# Patient Record
Sex: MALE | Race: WHITE | Employment: FULL TIME | URBAN - METROPOLITAN AREA
[De-identification: names, ages, dates, MRNs, and addresses within clinical notes are randomized per-mention and may not be internally consistent; named-entity substitution may affect disease eponyms.]

---

## 2017-04-08 ENCOUNTER — EMPLOYEE WELLNESS (OUTPATIENT)
Dept: OTHER | Age: 37
End: 2017-04-08

## 2017-04-08 LAB
CHOLESTEROL/HDL RATIO: 2.7
CHOLESTEROL: 181 MG/DL
GLUCOSE BLD-MCNC: 89 MG/DL (ref 70–99)
HDLC SERPL-MCNC: 68 MG/DL
LDL CHOLESTEROL: 97 MG/DL (ref 0–130)
PATIENT FASTING?: YES
TRIGL SERPL-MCNC: 78 MG/DL
VLDLC SERPL CALC-MCNC: NORMAL MG/DL (ref 1–30)

## 2017-05-18 ENCOUNTER — HOSPITAL ENCOUNTER (OUTPATIENT)
Age: 37
Discharge: HOME | End: 2017-05-18
Payer: COMMERCIAL

## 2017-05-18 LAB
ABSOLUTE EOS #: 0.2 K/UL (ref 0–0.4)
ABSOLUTE LYMPH #: 2.9 K/UL (ref 1–4.8)
ABSOLUTE MONO #: 0.8 K/UL (ref 0–1)
ANION GAP SERPL CALCULATED.3IONS-SCNC: 13 MMOL/L (ref 9–17)
BASOPHILS # BLD: 1 %
BASOPHILS ABSOLUTE: 0 K/UL (ref 0–0.2)
BUN BLDV-MCNC: 14 MG/DL (ref 6–20)
BUN/CREAT BLD: 18 (ref 9–20)
CALCIUM SERPL-MCNC: 9.3 MG/DL (ref 8.6–10.4)
CHLORIDE BLD-SCNC: 97 MMOL/L (ref 98–107)
CO2: 27 MMOL/L (ref 20–31)
CREAT SERPL-MCNC: 0.78 MG/DL (ref 0.7–1.2)
DIFFERENTIAL TYPE: NORMAL
EOSINOPHILS RELATIVE PERCENT: 3 %
GFR AFRICAN AMERICAN: >60 ML/MIN
GFR NON-AFRICAN AMERICAN: >60 ML/MIN
GFR SERPL CREATININE-BSD FRML MDRD: ABNORMAL ML/MIN/{1.73_M2}
GFR SERPL CREATININE-BSD FRML MDRD: ABNORMAL ML/MIN/{1.73_M2}
GLUCOSE BLD-MCNC: 91 MG/DL (ref 70–99)
HCT VFR BLD CALC: 43.8 % (ref 41–53)
HEMOGLOBIN: 14.7 G/DL (ref 13.5–17)
LYMPHOCYTES # BLD: 40 %
MCH RBC QN AUTO: 29.9 PG (ref 26–34)
MCHC RBC AUTO-ENTMCNC: 33.5 G/DL (ref 31–37)
MCV RBC AUTO: 89.1 FL (ref 80–100)
MONOCYTES # BLD: 10 %
PDW BLD-RTO: 13.8 % (ref 12.1–15.2)
PLATELET # BLD: 233 K/UL (ref 140–450)
PLATELET ESTIMATE: NORMAL
PMV BLD AUTO: NORMAL FL (ref 6–12)
POTASSIUM SERPL-SCNC: 4.5 MMOL/L (ref 3.7–5.3)
RBC # BLD: 4.91 M/UL (ref 4.5–5.9)
RBC # BLD: NORMAL 10*6/UL
SEG NEUTROPHILS: 46 %
SEGMENTED NEUTROPHILS ABSOLUTE COUNT: 3.4 K/UL (ref 1.8–7.7)
SODIUM BLD-SCNC: 137 MMOL/L (ref 135–144)
WBC # BLD: 7.3 K/UL (ref 3.5–11)
WBC # BLD: NORMAL 10*3/UL

## 2017-05-18 PROCEDURE — 36415 COLL VENOUS BLD VENIPUNCTURE: CPT

## 2017-05-18 PROCEDURE — 85025 COMPLETE CBC W/AUTO DIFF WBC: CPT

## 2017-05-18 PROCEDURE — 80048 BASIC METABOLIC PNL TOTAL CA: CPT

## 2017-05-25 ENCOUNTER — ANESTHESIA EVENT (OUTPATIENT)
Dept: OPERATING ROOM | Age: 37
End: 2017-05-25
Payer: COMMERCIAL

## 2017-05-26 ENCOUNTER — ANESTHESIA (OUTPATIENT)
Dept: OPERATING ROOM | Age: 37
End: 2017-05-26
Payer: COMMERCIAL

## 2017-05-26 ENCOUNTER — HOSPITAL ENCOUNTER (OUTPATIENT)
Age: 37
Setting detail: OUTPATIENT SURGERY
Discharge: HOME | End: 2017-05-26
Attending: ORTHOPAEDIC SURGERY | Admitting: ORTHOPAEDIC SURGERY
Payer: COMMERCIAL

## 2017-05-26 VITALS
RESPIRATION RATE: 18 BRPM | HEIGHT: 72 IN | HEART RATE: 85 BPM | WEIGHT: 206 LBS | BODY MASS INDEX: 27.9 KG/M2 | OXYGEN SATURATION: 98 % | DIASTOLIC BLOOD PRESSURE: 70 MMHG | SYSTOLIC BLOOD PRESSURE: 113 MMHG | TEMPERATURE: 97.6 F

## 2017-05-26 VITALS — SYSTOLIC BLOOD PRESSURE: 106 MMHG | OXYGEN SATURATION: 98 % | TEMPERATURE: 97.2 F | DIASTOLIC BLOOD PRESSURE: 62 MMHG

## 2017-05-26 PROCEDURE — 64415 NJX AA&/STRD BRCH PLXS IMG: CPT | Performed by: NURSE ANESTHETIST, CERTIFIED REGISTERED

## 2017-05-26 PROCEDURE — 3700000000 HC ANESTHESIA ATTENDED CARE: Performed by: ORTHOPAEDIC SURGERY

## 2017-05-26 PROCEDURE — 3700000001 HC ADD 15 MINUTES (ANESTHESIA): Performed by: ORTHOPAEDIC SURGERY

## 2017-05-26 PROCEDURE — 6360000002 HC RX W HCPCS: Performed by: ORTHOPAEDIC SURGERY

## 2017-05-26 PROCEDURE — 6360000002 HC RX W HCPCS: Performed by: NURSE ANESTHETIST, CERTIFIED REGISTERED

## 2017-05-26 PROCEDURE — 3600000004 HC SURGERY LEVEL 4 BASE: Performed by: ORTHOPAEDIC SURGERY

## 2017-05-26 PROCEDURE — C1713 ANCHOR/SCREW BN/BN,TIS/BN: HCPCS | Performed by: ORTHOPAEDIC SURGERY

## 2017-05-26 PROCEDURE — 2580000003 HC RX 258: Performed by: ORTHOPAEDIC SURGERY

## 2017-05-26 PROCEDURE — 2720000010 HC SURG SUPPLY STERILE: Performed by: ORTHOPAEDIC SURGERY

## 2017-05-26 PROCEDURE — 3600000014 HC SURGERY LEVEL 4 ADDTL 15MIN: Performed by: ORTHOPAEDIC SURGERY

## 2017-05-26 PROCEDURE — 2500000003 HC RX 250 WO HCPCS: Performed by: NURSE ANESTHETIST, CERTIFIED REGISTERED

## 2017-05-26 PROCEDURE — 7100000011 HC PHASE II RECOVERY - ADDTL 15 MIN: Performed by: ORTHOPAEDIC SURGERY

## 2017-05-26 PROCEDURE — 7100000010 HC PHASE II RECOVERY - FIRST 15 MIN: Performed by: ORTHOPAEDIC SURGERY

## 2017-05-26 DEVICE — ANCHOR SUT L12.5MM DIA2.9MM SHT SHLDR BIOCOMPOSITE PUSHLOCK: Type: IMPLANTABLE DEVICE | Site: SHOULDER | Status: FUNCTIONAL

## 2017-05-26 RX ORDER — PROPOFOL 10 MG/ML
INJECTION, EMULSION INTRAVENOUS PRN
Status: DISCONTINUED | OUTPATIENT
Start: 2017-05-26 | End: 2017-05-26 | Stop reason: SDUPTHER

## 2017-05-26 RX ORDER — KETOROLAC TROMETHAMINE 30 MG/ML
30 INJECTION, SOLUTION INTRAMUSCULAR; INTRAVENOUS ONCE
Status: DISCONTINUED | OUTPATIENT
Start: 2017-05-26 | End: 2017-05-26 | Stop reason: HOSPADM

## 2017-05-26 RX ORDER — OXYCODONE HYDROCHLORIDE AND ACETAMINOPHEN 5; 325 MG/1; MG/1
TABLET ORAL
Qty: 60 TABLET | Refills: 0 | Status: SHIPPED | OUTPATIENT
Start: 2017-05-26 | End: 2017-06-02

## 2017-05-26 RX ORDER — OXYCODONE HYDROCHLORIDE AND ACETAMINOPHEN 5; 325 MG/1; MG/1
2 TABLET ORAL EVERY 4 HOURS PRN
Status: DISCONTINUED | OUTPATIENT
Start: 2017-05-26 | End: 2017-05-26 | Stop reason: HOSPADM

## 2017-05-26 RX ORDER — LIDOCAINE HYDROCHLORIDE 20 MG/ML
INJECTION, SOLUTION INFILTRATION; PERINEURAL PRN
Status: DISCONTINUED | OUTPATIENT
Start: 2017-05-26 | End: 2017-05-26 | Stop reason: SDUPTHER

## 2017-05-26 RX ORDER — MIDAZOLAM HYDROCHLORIDE 1 MG/ML
INJECTION INTRAMUSCULAR; INTRAVENOUS PRN
Status: DISCONTINUED | OUTPATIENT
Start: 2017-05-26 | End: 2017-05-26 | Stop reason: SDUPTHER

## 2017-05-26 RX ORDER — ONDANSETRON 2 MG/ML
INJECTION INTRAMUSCULAR; INTRAVENOUS PRN
Status: DISCONTINUED | OUTPATIENT
Start: 2017-05-26 | End: 2017-05-26 | Stop reason: SDUPTHER

## 2017-05-26 RX ORDER — KETOROLAC TROMETHAMINE 10 MG/1
10 TABLET, FILM COATED ORAL 3 TIMES DAILY
Qty: 15 TABLET | Refills: 0 | Status: SHIPPED | OUTPATIENT
Start: 2017-05-26 | End: 2018-06-26

## 2017-05-26 RX ORDER — SODIUM CHLORIDE, SODIUM LACTATE, POTASSIUM CHLORIDE, CALCIUM CHLORIDE 600; 310; 30; 20 MG/100ML; MG/100ML; MG/100ML; MG/100ML
INJECTION, SOLUTION INTRAVENOUS CONTINUOUS
Status: DISCONTINUED | OUTPATIENT
Start: 2017-05-26 | End: 2017-05-26 | Stop reason: HOSPADM

## 2017-05-26 RX ORDER — FENTANYL CITRATE 50 UG/ML
INJECTION, SOLUTION INTRAMUSCULAR; INTRAVENOUS PRN
Status: DISCONTINUED | OUTPATIENT
Start: 2017-05-26 | End: 2017-05-26 | Stop reason: SDUPTHER

## 2017-05-26 RX ORDER — KETOROLAC TROMETHAMINE 30 MG/ML
INJECTION, SOLUTION INTRAMUSCULAR; INTRAVENOUS PRN
Status: DISCONTINUED | OUTPATIENT
Start: 2017-05-26 | End: 2017-05-26 | Stop reason: SDUPTHER

## 2017-05-26 RX ADMIN — PHENYLEPHRINE HYDROCHLORIDE 150 MCG: 10 INJECTION INTRAVENOUS at 13:07

## 2017-05-26 RX ADMIN — LIDOCAINE HYDROCHLORIDE 70 MG: 20 INJECTION, SOLUTION INFILTRATION; PERINEURAL at 12:30

## 2017-05-26 RX ADMIN — SODIUM CHLORIDE, POTASSIUM CHLORIDE, SODIUM LACTATE AND CALCIUM CHLORIDE: 600; 310; 30; 20 INJECTION, SOLUTION INTRAVENOUS at 12:58

## 2017-05-26 RX ADMIN — PHENYLEPHRINE HYDROCHLORIDE 100 MCG: 10 INJECTION INTRAVENOUS at 12:56

## 2017-05-26 RX ADMIN — FENTANYL CITRATE 100 MCG: 50 INJECTION INTRAMUSCULAR; INTRAVENOUS at 11:09

## 2017-05-26 RX ADMIN — PROPOFOL 170 MG: 10 INJECTION, EMULSION INTRAVENOUS at 12:30

## 2017-05-26 RX ADMIN — PHENYLEPHRINE HYDROCHLORIDE 100 MCG: 10 INJECTION INTRAVENOUS at 13:01

## 2017-05-26 RX ADMIN — CEFAZOLIN SODIUM 2 G: 2 SOLUTION INTRAVENOUS at 12:21

## 2017-05-26 RX ADMIN — ONDANSETRON 4 MG: 2 INJECTION INTRAMUSCULAR; INTRAVENOUS at 13:16

## 2017-05-26 RX ADMIN — MIDAZOLAM HYDROCHLORIDE 4 MG: 1 INJECTION, SOLUTION INTRAMUSCULAR; INTRAVENOUS at 11:09

## 2017-05-26 RX ADMIN — SODIUM CHLORIDE, POTASSIUM CHLORIDE, SODIUM LACTATE AND CALCIUM CHLORIDE: 600; 310; 30; 20 INJECTION, SOLUTION INTRAVENOUS at 08:57

## 2017-05-26 RX ADMIN — KETOROLAC TROMETHAMINE 30 MG: 30 INJECTION, SOLUTION INTRAMUSCULAR at 13:16

## 2017-05-26 RX ADMIN — PHENYLEPHRINE HYDROCHLORIDE 150 MCG: 10 INJECTION INTRAVENOUS at 12:54

## 2017-05-26 ASSESSMENT — PAIN SCALES - GENERAL
PAINLEVEL_OUTOF10: 0

## 2017-05-26 ASSESSMENT — PAIN - FUNCTIONAL ASSESSMENT: PAIN_FUNCTIONAL_ASSESSMENT: 0-10

## 2018-03-20 VITALS — WEIGHT: 202 LBS | BODY MASS INDEX: 26.65 KG/M2

## 2018-04-03 ENCOUNTER — EMPLOYEE WELLNESS (OUTPATIENT)
Dept: OTHER | Age: 38
End: 2018-04-03

## 2018-04-03 LAB
CHOLESTEROL/HDL RATIO: 2.6
CHOLESTEROL: 150 MG/DL
GLUCOSE BLD-MCNC: 88 MG/DL (ref 70–99)
HDLC SERPL-MCNC: 58 MG/DL
LDL CHOLESTEROL: 75 MG/DL (ref 0–130)
PATIENT FASTING?: YES
TRIGL SERPL-MCNC: 85 MG/DL
VLDLC SERPL CALC-MCNC: NORMAL MG/DL (ref 1–30)

## 2018-04-10 VITALS — WEIGHT: 194 LBS | BODY MASS INDEX: 26.31 KG/M2

## 2019-03-29 ENCOUNTER — EMPLOYEE WELLNESS (OUTPATIENT)
Dept: OTHER | Age: 39
End: 2019-03-29

## 2019-03-29 LAB
CHOLESTEROL/HDL RATIO: 2.4
CHOLESTEROL: 154 MG/DL
GLUCOSE BLD-MCNC: 86 MG/DL (ref 70–99)
HDLC SERPL-MCNC: 63 MG/DL
LDL CHOLESTEROL: 78 MG/DL (ref 0–130)
PATIENT FASTING?: YES
TRIGL SERPL-MCNC: 67 MG/DL
VLDLC SERPL CALC-MCNC: NORMAL MG/DL (ref 1–30)

## 2019-04-08 VITALS — BODY MASS INDEX: 26.52 KG/M2 | WEIGHT: 201 LBS

## 2019-08-29 ENCOUNTER — OFFICE VISIT (OUTPATIENT)
Dept: PRIMARY CARE CLINIC | Age: 39
End: 2019-08-29
Payer: COMMERCIAL

## 2019-08-29 VITALS
DIASTOLIC BLOOD PRESSURE: 69 MMHG | HEART RATE: 74 BPM | SYSTOLIC BLOOD PRESSURE: 114 MMHG | WEIGHT: 210 LBS | TEMPERATURE: 98.2 F | BODY MASS INDEX: 27.71 KG/M2

## 2019-08-29 DIAGNOSIS — H60.312 ACUTE DIFFUSE OTITIS EXTERNA OF LEFT EAR: Primary | ICD-10-CM

## 2019-08-29 PROCEDURE — 99213 OFFICE O/P EST LOW 20 MIN: CPT | Performed by: NURSE PRACTITIONER

## 2019-08-29 RX ORDER — AMOXICILLIN AND CLAVULANATE POTASSIUM 875; 125 MG/1; MG/1
1 TABLET, FILM COATED ORAL 2 TIMES DAILY
Qty: 20 TABLET | Refills: 0 | Status: SHIPPED | OUTPATIENT
Start: 2019-08-29 | End: 2019-09-08

## 2019-08-29 RX ORDER — CETIRIZINE HYDROCHLORIDE 10 MG/1
10 TABLET ORAL DAILY PRN
COMMUNITY

## 2019-08-29 RX ORDER — CIPROFLOXACIN AND DEXAMETHASONE 3; 1 MG/ML; MG/ML
4 SUSPENSION/ DROPS AURICULAR (OTIC) 2 TIMES DAILY
Qty: 1 BOTTLE | Refills: 0 | Status: SHIPPED | OUTPATIENT
Start: 2019-08-29 | End: 2019-09-08

## 2019-08-29 RX ORDER — MELOXICAM 15 MG/1
15 TABLET ORAL DAILY
COMMUNITY
End: 2020-05-21 | Stop reason: ALTCHOICE

## 2019-08-29 ASSESSMENT — ENCOUNTER SYMPTOMS
SORE THROAT: 0
RHINORRHEA: 0
VOMITING: 0
ABDOMINAL PAIN: 0
DIARRHEA: 0
COUGH: 0

## 2019-08-29 NOTE — PATIENT INSTRUCTIONS
SURVEY:    You may be receiving a survey from [employer] regarding your visit today. Please complete the survey to enable us to provide the highest quality of care to you and your family. If you cannot score us a very good on any question, please call the office to discuss how we could of made your experience a very good one. Thank you. Patient Education        Swimmer's Ear: Care Instructions  Your Care Instructions    Swimmer's ear (otitis externa) is inflammation or infection of the ear canal. This is the passage that leads from the outer ear to the eardrum. Any water, sand, or other debris that gets into the ear canal and stays there can cause swimmer's ear. Putting cotton swabs or other items in the ear to clean it can also cause this problem. Swimmer's ear can be very painful. But you can treat the pain and infection with medicines. You should feel better in a few days. Follow-up care is a key part of your treatment and safety. Be sure to make and go to all appointments, and call your doctor if you are having problems. It's also a good idea to know your test results and keep a list of the medicines you take. How can you care for yourself at home? Cleaning and care  · Use antibiotic drops as your doctor directs. · Do not insert ear drops (other than the antibiotic ear drops) or anything else into the ear unless your doctor has told you to. · Avoid getting water in the ear until the problem clears up. Use cotton lightly coated with petroleum jelly as an earplug. Do not use plastic earplugs. · Use a hair dryer set on low to carefully dry the ear after you shower. · To ease ear pain, hold a warm washcloth against your ear. · Take pain medicines exactly as directed. ? If the doctor gave you a prescription medicine for pain, take it as prescribed. ? If you are not taking a prescription pain medicine, ask your doctor if you can take an over-the-counter medicine.   Inserting ear drops  · Warm

## 2019-08-29 NOTE — PROGRESS NOTES
ciprofloxacin-dexamethasone (CIPRODEX) 0.3-0.1 % otic suspension     Sig: Place 4 drops into the left ear 2 times daily for 10 days     Dispense:  1 Bottle     Refill:  0    amoxicillin-clavulanate (AUGMENTIN) 875-125 MG per tablet     Sig: Take 1 tablet by mouth 2 times daily for 10 days     Dispense:  20 tablet     Refill:  0          All patient questions answered. Pt voiced understanding.       Electronically signed by [address] [place] [address], APRN - CNP on 8/29/2019 at 10:57 AM

## 2020-02-18 ENCOUNTER — OFFICE VISIT (OUTPATIENT)
Dept: PRIMARY CARE CLINIC | Age: 40
End: 2020-02-18
Payer: COMMERCIAL

## 2020-02-18 VITALS
DIASTOLIC BLOOD PRESSURE: 52 MMHG | TEMPERATURE: 97.8 F | BODY MASS INDEX: 28.1 KG/M2 | RESPIRATION RATE: 16 BRPM | SYSTOLIC BLOOD PRESSURE: 111 MMHG | HEIGHT: 73 IN | HEART RATE: 71 BPM | WEIGHT: 212 LBS

## 2020-02-18 PROCEDURE — 99214 OFFICE O/P EST MOD 30 MIN: CPT | Performed by: NURSE PRACTITIONER

## 2020-02-18 RX ORDER — PREDNISONE 20 MG/1
40 TABLET ORAL DAILY
Qty: 10 TABLET | Refills: 0 | Status: SHIPPED | OUTPATIENT
Start: 2020-02-18 | End: 2020-02-23

## 2020-02-18 RX ORDER — AMOXICILLIN AND CLAVULANATE POTASSIUM 875; 125 MG/1; MG/1
1 TABLET, FILM COATED ORAL 2 TIMES DAILY
Qty: 20 TABLET | Refills: 0 | Status: SHIPPED | OUTPATIENT
Start: 2020-02-18 | End: 2020-02-28

## 2020-02-18 ASSESSMENT — ENCOUNTER SYMPTOMS
SHORTNESS OF BREATH: 0
RHINORRHEA: 1
SINUS PRESSURE: 1
TROUBLE SWALLOWING: 0
SINUS PAIN: 1
SORE THROAT: 1
COUGH: 0
ABDOMINAL PAIN: 0
VOMITING: 0
WHEEZING: 0
NAUSEA: 0
DIARRHEA: 0

## 2020-05-19 PROBLEM — F41.9 ANXIETY: Status: ACTIVE | Noted: 2020-05-19

## 2020-05-19 PROBLEM — F32.A DEPRESSION: Status: ACTIVE | Noted: 2020-05-19

## 2020-05-21 ENCOUNTER — TELEPHONE (OUTPATIENT)
Dept: PRIMARY CARE CLINIC | Age: 40
End: 2020-05-21

## 2020-05-21 ENCOUNTER — OFFICE VISIT (OUTPATIENT)
Dept: PRIMARY CARE CLINIC | Age: 40
End: 2020-05-21
Payer: COMMERCIAL

## 2020-05-21 ENCOUNTER — HOSPITAL ENCOUNTER (OUTPATIENT)
Age: 40
Discharge: HOME | End: 2020-05-21
Payer: COMMERCIAL

## 2020-05-21 VITALS
RESPIRATION RATE: 14 BRPM | HEART RATE: 79 BPM | DIASTOLIC BLOOD PRESSURE: 56 MMHG | SYSTOLIC BLOOD PRESSURE: 97 MMHG | HEIGHT: 72 IN | BODY MASS INDEX: 27.66 KG/M2 | WEIGHT: 204.2 LBS | TEMPERATURE: 98.1 F

## 2020-05-21 LAB
ABSOLUTE EOS #: 0.13 K/UL (ref 0–0.44)
ABSOLUTE IMMATURE GRANULOCYTE: <0.03 K/UL (ref 0–0.3)
ABSOLUTE LYMPH #: 2.12 K/UL (ref 1.1–3.7)
ABSOLUTE MONO #: 0.65 K/UL (ref 0.1–1.2)
ALBUMIN SERPL-MCNC: 4.7 G/DL (ref 3.5–5.2)
ALBUMIN/GLOBULIN RATIO: 1.7 (ref 1–2.5)
ALP BLD-CCNC: 61 U/L (ref 40–129)
ALT SERPL-CCNC: 14 U/L (ref 5–41)
AMYLASE: 71 U/L (ref 28–100)
ANION GAP SERPL CALCULATED.3IONS-SCNC: 14 MMOL/L (ref 9–17)
AST SERPL-CCNC: 17 U/L
BASOPHILS # BLD: 1 % (ref 0–2)
BASOPHILS ABSOLUTE: 0.04 K/UL (ref 0–0.2)
BILIRUB SERPL-MCNC: 0.66 MG/DL (ref 0.3–1.2)
BUN BLDV-MCNC: 12 MG/DL (ref 6–20)
BUN/CREAT BLD: 14 (ref 9–20)
CALCIUM SERPL-MCNC: 9.4 MG/DL (ref 8.6–10.4)
CHLORIDE BLD-SCNC: 103 MMOL/L (ref 98–107)
CHOLESTEROL/HDL RATIO: 2.7
CHOLESTEROL: 169 MG/DL
CO2: 26 MMOL/L (ref 20–31)
CREAT SERPL-MCNC: 0.87 MG/DL (ref 0.7–1.2)
DIFFERENTIAL TYPE: NORMAL
EOSINOPHILS RELATIVE PERCENT: 2 % (ref 1–4)
GFR AFRICAN AMERICAN: >60 ML/MIN
GFR NON-AFRICAN AMERICAN: >60 ML/MIN
GFR SERPL CREATININE-BSD FRML MDRD: NORMAL ML/MIN/{1.73_M2}
GFR SERPL CREATININE-BSD FRML MDRD: NORMAL ML/MIN/{1.73_M2}
GLUCOSE BLD-MCNC: 94 MG/DL (ref 70–99)
HCT VFR BLD CALC: 47.2 % (ref 40.7–50.3)
HDLC SERPL-MCNC: 62 MG/DL
HEMOGLOBIN: 15.3 G/DL (ref 13–17)
IMMATURE GRANULOCYTES: 0 %
LDL CHOLESTEROL: 88 MG/DL (ref 0–130)
LIPASE: 34 U/L (ref 13–60)
LYMPHOCYTES # BLD: 36 % (ref 24–43)
MCH RBC QN AUTO: 30.1 PG (ref 25.2–33.5)
MCHC RBC AUTO-ENTMCNC: 32.4 G/DL (ref 28.4–34.8)
MCV RBC AUTO: 92.9 FL (ref 82.6–102.9)
MONOCYTES # BLD: 11 % (ref 3–12)
NRBC AUTOMATED: 0 PER 100 WBC
PDW BLD-RTO: 12.3 % (ref 11.8–14.4)
PLATELET # BLD: 209 K/UL (ref 138–453)
PLATELET ESTIMATE: NORMAL
PMV BLD AUTO: 11.2 FL (ref 8.1–13.5)
POTASSIUM SERPL-SCNC: 4.5 MMOL/L (ref 3.7–5.3)
RBC # BLD: 5.08 M/UL (ref 4.21–5.77)
RBC # BLD: NORMAL 10*6/UL
SEG NEUTROPHILS: 50 % (ref 36–65)
SEGMENTED NEUTROPHILS ABSOLUTE COUNT: 3.03 K/UL (ref 1.5–8.1)
SODIUM BLD-SCNC: 143 MMOL/L (ref 135–144)
TOTAL PROTEIN: 7.5 G/DL (ref 6.4–8.3)
TRIGL SERPL-MCNC: 95 MG/DL
VLDLC SERPL CALC-MCNC: NORMAL MG/DL (ref 1–30)
WBC # BLD: 6 K/UL (ref 3.5–11.3)
WBC # BLD: NORMAL 10*3/UL

## 2020-05-21 PROCEDURE — 99214 OFFICE O/P EST MOD 30 MIN: CPT | Performed by: NURSE PRACTITIONER

## 2020-05-21 PROCEDURE — 36415 COLL VENOUS BLD VENIPUNCTURE: CPT

## 2020-05-21 PROCEDURE — 80061 LIPID PANEL: CPT

## 2020-05-21 PROCEDURE — 80053 COMPREHEN METABOLIC PANEL: CPT

## 2020-05-21 PROCEDURE — 85025 COMPLETE CBC W/AUTO DIFF WBC: CPT

## 2020-05-21 PROCEDURE — 83690 ASSAY OF LIPASE: CPT

## 2020-05-21 PROCEDURE — 82150 ASSAY OF AMYLASE: CPT

## 2020-05-21 RX ORDER — HYOSCYAMINE SULFATE 0.12 MG/1
1 TABLET SUBLINGUAL EVERY 4 HOURS PRN
Qty: 60 EACH | Refills: 1 | Status: SHIPPED | OUTPATIENT
Start: 2020-05-21 | End: 2020-11-20 | Stop reason: ALTCHOICE

## 2020-05-21 SDOH — ECONOMIC STABILITY: FOOD INSECURITY: WITHIN THE PAST 12 MONTHS, YOU WORRIED THAT YOUR FOOD WOULD RUN OUT BEFORE YOU GOT MONEY TO BUY MORE.: NEVER TRUE

## 2020-05-21 SDOH — ECONOMIC STABILITY: INCOME INSECURITY: HOW HARD IS IT FOR YOU TO PAY FOR THE VERY BASICS LIKE FOOD, HOUSING, MEDICAL CARE, AND HEATING?: NOT HARD AT ALL

## 2020-05-21 SDOH — ECONOMIC STABILITY: TRANSPORTATION INSECURITY
IN THE PAST 12 MONTHS, HAS LACK OF TRANSPORTATION KEPT YOU FROM MEETINGS, WORK, OR FROM GETTING THINGS NEEDED FOR DAILY LIVING?: NO

## 2020-05-21 SDOH — ECONOMIC STABILITY: FOOD INSECURITY: WITHIN THE PAST 12 MONTHS, THE FOOD YOU BOUGHT JUST DIDN'T LAST AND YOU DIDN'T HAVE MONEY TO GET MORE.: NEVER TRUE

## 2020-05-21 SDOH — ECONOMIC STABILITY: TRANSPORTATION INSECURITY
IN THE PAST 12 MONTHS, HAS THE LACK OF TRANSPORTATION KEPT YOU FROM MEDICAL APPOINTMENTS OR FROM GETTING MEDICATIONS?: NO

## 2020-05-21 ASSESSMENT — PATIENT HEALTH QUESTIONNAIRE - PHQ9
SUM OF ALL RESPONSES TO PHQ QUESTIONS 1-9: 0
SUM OF ALL RESPONSES TO PHQ9 QUESTIONS 1 & 2: 0
1. LITTLE INTEREST OR PLEASURE IN DOING THINGS: 0
2. FEELING DOWN, DEPRESSED OR HOPELESS: 0
SUM OF ALL RESPONSES TO PHQ QUESTIONS 1-9: 0

## 2020-05-21 ASSESSMENT — ENCOUNTER SYMPTOMS
BELCHING: 0
VOMITING: 0
COUGH: 0
NAUSEA: 1
SHORTNESS OF BREATH: 0
RHINORRHEA: 0
HEMATOCHEZIA: 0
CONSTIPATION: 0
FLATUS: 0
ABDOMINAL PAIN: 1
WHEEZING: 0
SORE THROAT: 0
DIARRHEA: 0

## 2020-05-21 ASSESSMENT — CROHNS DISEASE ACTIVITY INDEX (CDAI): CDAI SCORE: 0

## 2020-05-21 NOTE — PATIENT INSTRUCTIONS
SURVEY:    You may be receiving a survey from [email or web address] regarding your visit today. Please complete the survey to enable us to provide the highest quality of care to you and your family. If you cannot score us a very good on any question, please call the office to discuss how we could have made your experience a very good one. Thank you. Patient Education        Abdominal Pain: Care Instructions  Your Care Instructions    Abdominal pain has many possible causes. Some aren't serious and get better on their own in a few days. Others need more testing and treatment. If your pain continues or gets worse, you need to be rechecked and may need more tests to find out what is wrong. You may need surgery to correct the problem. Don't ignore new symptoms, such as fever, nausea and vomiting, urination problems, pain that gets worse, and dizziness. These may be signs of a more serious problem. Your doctor may have recommended a follow-up visit in the next 8 to 12 hours. If you are not getting better, you may need more tests or treatment. The doctor has checked you carefully, but problems can develop later. If you notice any problems or new symptoms, get medical treatment right away. Follow-up care is a key part of your treatment and safety. Be sure to make and go to all appointments, and call your doctor if you are having problems. It's also a good idea to know your test results and keep a list of the medicines you take. How can you care for yourself at home? · Rest until you feel better. · To prevent dehydration, drink plenty of fluids, enough so that your urine is light yellow or clear like water. Choose water and other caffeine-free clear liquids until you feel better. If you have kidney, heart, or liver disease and have to limit fluids, talk with your doctor before you increase the amount of fluids you drink.   · If your stomach is upset, eat mild foods, such as rice, dry toast or crackers, bananas, and

## 2020-05-21 NOTE — PROGRESS NOTES
Name: Jordi Cedeno  : 1980         Chief Complaint:     Chief Complaint   Patient presents with    [facility]     New patient.  Bloated     'for quite awhile. \" Denies diarrhea, consipation or nausea. C/o abd 'soreness and gas. \"        History of Present Illness:      Jordi Cedeno is a 41 y.o.  male who presents with [facility] (New patient. ) and Bloated ('for quite awhile. \" Denies diarrhea, consipation or nausea. C/o abd 'soreness and gas. \" )      Natty Nash is here today for a routine office visit. Bloating- patient has had problems with his stomach for several years. Patient states he will have periods of bloating and abdominal discomfort mainly in the upper abdomen. Patient states this is related to eating most of the time. Patient states he eats breakfast lunch and sometimes does not finish his dinner because of a sensation of fullness. Patient states he has tried various antiacids in the past with no success. Nervousness-patient states he has mild agitation and anxiety at times. Has been on sertraline for many years with good control. Abdominal Pain   This is a chronic problem. The current episode started more than 1 year ago. The onset quality is undetermined. The problem occurs intermittently. The problem has been unchanged. The pain is located in the epigastric region, LLQ, RUQ and generalized abdominal region. The pain is at a severity of 4/10. The pain is moderate. The quality of the pain is cramping, colicky, a sensation of fullness and dull. The abdominal pain does not radiate. Associated symptoms include anorexia (From early satiety) and nausea. Pertinent negatives include no arthralgias, belching, constipation, diarrhea, dysuria, fever, flatus, frequency, headaches, hematochezia, hematuria, melena, myalgias, vomiting or weight loss. The pain is aggravated by eating. The pain is relieved by nothing.  He has tried antacids, H2 blockers and proton pump inhibitors for the symptoms. The treatment provided no relief. There is no history of abdominal surgery, colon cancer, Crohn's disease, gallstones, GERD, irritable bowel syndrome, pancreatitis, PUD or ulcerative colitis. Past Medical History:     Past Medical History:   Diagnosis Date    Anxiety     Depression     Headache       Reviewed all health maintenance requirements and ordered appropriate tests  There are no preventive care reminders to display for this patient. Past Surgical History:     Past Surgical History:   Procedure Laterality Date    ANKLE FRACTURE SURGERY  2008    right trimalleolar fracture ORIF - Dr. Dg Velasquez ARTHROSCOPY Right 5/26/2017    Dr. Perfecto Quintana - right shoulder SLAP repair   [id number]    VASECTOMY  12/24/2015    Dr. Poonam Chang        Medications:       Prior to Admission medications    Medication Sig Start Date End Date Taking? Authorizing Provider   Hyoscyamine Sulfate SL (LEVSIN/SL) 0.125 MG SUBL Place 1 tablet under the tongue every 4 hours as needed (spasm) 5/21/20  Yes PAOLA Oates - CNP   sertraline (ZOLOFT) 50 MG tablet TAKE 1 TABLET BY MOUTH ONE TIME A DAY 3/13/20  Yes Linwood Samuel MD   cetirizine (ZYRTEC) 10 MG tablet Take 10 mg by mouth daily as needed for Allergies   Yes Historical Provider, MD        Allergies:       Seasonal    Social History:     Tobacco:    reports that he has never smoked. He has never used smokeless tobacco.  Alcohol:      reports current alcohol use. Drug Use:  reports no history of drug use.     Family History:     Family History   Problem Relation Age of Onset    High Blood Pressure Father     High Cholesterol Father     Stroke Paternal Grandmother     Diabetes Paternal Grandmother     Heart Disease Paternal Grandfather         s/p CABG    Hypertension Paternal Grandfather        Review of Systems:     Positive and Negative as described in HPI    Review of Systems   Constitutional: Negative for chills, fatigue, fever and weight loss. HENT: Negative for congestion, rhinorrhea and sore throat. Eyes: Negative for visual disturbance. Respiratory: Negative for cough, shortness of breath and wheezing. Cardiovascular: Negative for chest pain and palpitations. Gastrointestinal: Positive for abdominal pain, anorexia (From early satiety) and nausea. Negative for constipation, diarrhea, flatus, hematochezia, melena and vomiting. Genitourinary: Negative for difficulty urinating, dysuria, frequency, hematuria and testicular pain. Musculoskeletal: Negative for arthralgias, gait problem, myalgias, neck pain and neck stiffness. Skin: Negative for rash. Neurological: Negative for dizziness, syncope, light-headedness and headaches. Physical Exam:   Vitals:  BP (!) 97/56 (Site: Left Upper Arm, Position: Sitting, Cuff Size: Large Adult)   Pulse 79   Temp 98.1 °F (36.7 °C) (Temporal)   Resp 14   Ht 6' (1.829 m)   Wt 204 lb 3.2 oz (92.6 kg)   BMI 27.69 kg/m²     Physical Exam  Vitals signs and nursing note reviewed. Constitutional:       General: He is not in acute distress. Appearance: Normal appearance. He is normal weight. He is not ill-appearing. HENT:      Mouth/Throat:      Mouth: Mucous membranes are moist.      Pharynx: No posterior oropharyngeal erythema. Eyes:      General: No scleral icterus. Conjunctiva/sclera: Conjunctivae normal.   Neck:      Musculoskeletal: Normal range of motion and neck supple. Cardiovascular:      Rate and Rhythm: Normal rate and regular rhythm. Heart sounds: No murmur. Pulmonary:      Effort: Pulmonary effort is normal.      Breath sounds: Normal breath sounds. No wheezing or rales. Abdominal:      General: Abdomen is flat. Bowel sounds are normal. There is no distension. Palpations: Abdomen is soft. There is no mass. Tenderness: There is abdominal tenderness (mild upper diffuse).  There is no right CVA tenderness, left CVA tenderness, guarding or rebound. Hernia: No hernia is present. Musculoskeletal:      Right lower leg: No edema. Left lower leg: No edema. Skin:     General: Skin is warm and dry. Coloration: Skin is not jaundiced. Findings: No rash. Neurological:      Mental Status: He is alert and oriented to person, place, and time. Psychiatric:         Mood and Affect: Mood normal.         Behavior: Behavior normal.         Data:     Lab Results   Component Value Date     05/18/2017    K 4.5 05/18/2017    CL 97 05/18/2017    CO2 27 05/18/2017    BUN 14 05/18/2017    CREATININE 0.78 05/18/2017    GLUCOSE 86 03/29/2019     Lab Results   Component Value Date    WBC 6.0 05/21/2020    RBC 5.08 05/21/2020    HGB 15.3 05/21/2020    HCT 47.2 05/21/2020    MCV 92.9 05/21/2020    MCH 30.1 05/21/2020    MCHC 32.4 05/21/2020    RDW 12.3 05/21/2020     05/21/2020    MPV 11.2 05/21/2020     No results found for: TSH  Lab Results   Component Value Date    CHOL 169 05/21/2020    HDL 62 05/21/2020       Assessment/Plan:      Diagnosis Orders   1. Abdominal bloating  CBC Auto Differential    Comprehensive Metabolic Panel    NM HEPATOBILIARY SCAN W EJECTION FRACTION    Amylase    Lipase    Hyoscyamine Sulfate SL (LEVSIN/SL) 0.125 MG SUBL   2. Diffuse abdominal pain  CBC Auto Differential    Comprehensive Metabolic Panel    NM HEPATOBILIARY SCAN W EJECTION FRACTION    Amylase    Lipase    Hyoscyamine Sulfate SL (LEVSIN/SL) 0.125 MG SUBL   3. Nervousness     4. Lipid screening  Lipid Panel     This could be related to gallbladder function issues. We will obtain baseline labs and HIDA scan. I would like to also trial him on Levsin for symptomatic relief. I also did mention that this could be related to chronic use of sertraline. We may discuss options for change in medication if no improvement.     1.  Damien received counseling on the following healthy behaviors: nutrition, exercise and medication

## 2020-05-29 ENCOUNTER — HOSPITAL ENCOUNTER (OUTPATIENT)
Dept: NUCLEAR MEDICINE | Age: 40
Discharge: HOME | End: 2020-05-31
Payer: COMMERCIAL

## 2020-05-29 VITALS — WEIGHT: 204 LBS | BODY MASS INDEX: 27.67 KG/M2

## 2020-05-29 PROCEDURE — A9537 TC99M MEBROFENIN: HCPCS | Performed by: NURSE PRACTITIONER

## 2020-05-29 PROCEDURE — 2580000003 HC RX 258: Performed by: NURSE PRACTITIONER

## 2020-05-29 PROCEDURE — 6360000002 HC RX W HCPCS: Performed by: NURSE PRACTITIONER

## 2020-05-29 PROCEDURE — 3430000000 HC RX DIAGNOSTIC RADIOPHARMACEUTICAL: Performed by: NURSE PRACTITIONER

## 2020-05-29 PROCEDURE — 78227 HEPATOBIL SYST IMAGE W/DRUG: CPT

## 2020-05-29 RX ADMIN — Medication 5 MILLICURIE: at 09:00

## 2020-05-29 RX ADMIN — SODIUM CHLORIDE 1.85 MCG: 9 INJECTION, SOLUTION INTRAVENOUS at 10:30

## 2020-06-01 ENCOUNTER — TELEPHONE (OUTPATIENT)
Dept: PRIMARY CARE CLINIC | Age: 40
End: 2020-06-01

## 2020-06-01 NOTE — TELEPHONE ENCOUNTER
Attempted to call patient and message left regarding xray results showing gallbladder not functioning well. Informed that Giovanna Gonzalez referred him to a surgeon, Dr. Tisha Sahni and that office will call to schedule his appt. Location and phone number to that office provided. Instructed to call this office with any questions.

## 2020-06-09 ENCOUNTER — OFFICE VISIT (OUTPATIENT)
Dept: SURGERY | Age: 40
End: 2020-06-09
Payer: COMMERCIAL

## 2020-06-09 VITALS
SYSTOLIC BLOOD PRESSURE: 131 MMHG | TEMPERATURE: 98.2 F | BODY MASS INDEX: 26.54 KG/M2 | DIASTOLIC BLOOD PRESSURE: 81 MMHG | RESPIRATION RATE: 20 BRPM | HEART RATE: 87 BPM | WEIGHT: 200.3 LBS | HEIGHT: 73 IN

## 2020-06-09 PROCEDURE — 99202 OFFICE O/P NEW SF 15 MIN: CPT | Performed by: SURGERY

## 2020-06-09 NOTE — LETTER
[facility] of [place] [address]  Phone: [phone]  Fax: [phone]    Chasidy Madison MD        July 5, 2020     Debra Brannon, APRN - [facility] [address]    Patient: Nirali Moffett  MR Number: [id number]  YOB: 1980  Date of Visit: 6/9/2020    Dear. Debra Brannon:    Thank you for the request for consultation for Jakob Reis to me for the evaluation of biliary dyskinesia. Below are the relevant portions of my assessment and plan of care. ASSESSMENT       Diagnosis Orders   1. Biliary dyskinesia            PLAN     Will proceed with elective lap nubia, robotic assist.  Risks, benefits, alternatives thoroughly reviewed and accepted by Mr Eduardo Collins, including remote risk of bleeding, infection, bowel/bile duct injury, COVID-19 exposure/infection, etc.  [place] diet for now. If you have questions, please do not hesitate to call me. I look forward to following Gopi Wall along with you.     Sincerely,        Chasidy Madison MD

## 2020-06-24 NOTE — PROGRESS NOTES
Patient instructed on the pre-operative, intra-operative, and post-operative process. Patient's surgery arrival time to the hospital and surgery start time confirmed for the day of surgery. Patient instructed on NPO status. Medication instructions reviewed with patient. Pre operative instruction sheet reviewed and given to patient via telephone. Pt instructed to no take aspirin products for 7 days prior to surgery and to only take zoloft the morning of surgery with a sip of water.

## 2020-07-02 ENCOUNTER — HOSPITAL ENCOUNTER (OUTPATIENT)
Dept: PREADMISSION TESTING | Age: 40
Setting detail: SPECIMEN
Discharge: HOME | End: 2020-07-06
Payer: COMMERCIAL

## 2020-07-02 PROCEDURE — U0004 COV-19 TEST NON-CDC HGH THRU: HCPCS

## 2020-07-03 LAB
SARS-COV-2, PCR: NOT DETECTED
SARS-COV-2, RAPID: NORMAL
SARS-COV-2: NORMAL
SOURCE: NORMAL

## 2020-07-05 ASSESSMENT — ENCOUNTER SYMPTOMS
SHORTNESS OF BREATH: 0
VOMITING: 0
TROUBLE SWALLOWING: 0
BLOOD IN STOOL: 0
ABDOMINAL PAIN: 1
SORE THROAT: 0
CHOKING: 0
COUGH: 0
BACK PAIN: 0
ABDOMINAL DISTENTION: 1
NAUSEA: 0

## 2020-07-06 ENCOUNTER — TELEPHONE (OUTPATIENT)
Dept: PRIMARY CARE CLINIC | Age: 40
End: 2020-07-06

## 2020-07-06 NOTE — PROGRESS NOTES
Justin Rogers MD  General Surgery, Endoscopy  Chief Medical Officer    [facility]  [address] [facility] [address]  Dept: [phone]  Fax: [address]  Chief Complaint   Patient presents with    Abdominal Pain     HIDA scan shows Biliary Dyskenisia. States has had abdominal pain for several months. Pain worse after eating. HPI    Mr Veronica Lincoln is a pleasant 40 yo WM kindly referred to me by Feliberto Brannon for evaluation of biliary dyskinesia. He has had intermittent epigastric and RUQ pain for the last several months. Mostly postprandial, occasionally awakens him at night. Nausea, without emesis. Relieved with GI rest.  HIDA scan May 29, 2020 showing ejection fraction of 21%. CCK injection re created his symptoms. No recent weight changes. BMI 26.4. No prior abdominal surgery. No prior endoscopy. . Never smoker. No cough, fever, nor other respiratory symptoms. No recent travel nor sick contacts. Review of Systems   Constitutional: Negative for activity change, appetite change, chills, fever and unexpected weight change. HENT: Negative for nosebleeds, sneezing, sore throat and trouble swallowing. Eyes: Negative for visual disturbance. Respiratory: Negative for cough, choking and shortness of breath. Cardiovascular: Negative for chest pain, palpitations and leg swelling. Gastrointestinal: Positive for abdominal distention and abdominal pain (epigastric, RUQ). Negative for blood in stool, nausea and vomiting. Genitourinary: Negative for dysuria, flank pain and hematuria. Musculoskeletal: Negative for back pain, gait problem and myalgias. Allergic/Immunologic: Negative for immunocompromised state. Neurological: Positive for headaches. Negative for dizziness, seizures, syncope and weakness. Hematological: Does not bruise/bleed easily. Psychiatric/Behavioral: Positive for dysphoric mood.  Negative for confusion and sleep disturbance. Past Medical History:   Diagnosis Date    Anxiety     Depression     Headache        Past Surgical History:   Procedure Laterality Date    ANKLE FRACTURE SURGERY  2008    right trimalleolar fracture ORIF - Dr. Enoch Diaz ARTHROSCOPY Right 5/26/2017    Dr. Whit Mcallister - right shoulder SLAP repair    TONSILLECTOMY  1984    VASECTOMY  12/24/2015    Dr. Yusuf Rivas       Family History   Problem Relation Age of Onset    High Blood Pressure Father     High Cholesterol Father     Stroke Paternal Grandmother     Diabetes Paternal Grandmother     Heart Disease Paternal Grandfather         s/p CABG    Hypertension Paternal Grandfather        Allergies:  See list    Current Outpatient Medications   Medication Sig Dispense Refill    Hyoscyamine Sulfate SL (LEVSIN/SL) 0.125 MG SUBL Place 1 tablet under the tongue every 4 hours as needed (spasm) 60 each 1    sertraline (ZOLOFT) 50 MG tablet TAKE 1 TABLET BY MOUTH ONE TIME A DAY 90 tablet 0    cetirizine (ZYRTEC) 10 MG tablet Take 10 mg by mouth daily as needed for Allergies       No current facility-administered medications for this visit.         Social History     Socioeconomic History    Marital status:      Spouse name: Yvonne Cardenas Number of children: 2    Years of education: 12    Highest education level: None   Occupational History    Occupation:    Social Needs    Financial resource strain: Not hard at all   [employer] insecurity     Worry: Never true     Inability: Never true   [employer] needs     Medical: No     Non-medical: No   Tobacco Use    Smoking status: Never Smoker    Smokeless tobacco: Never Used   Substance and Sexual Activity    Alcohol use: Yes     Comment: SOCIAL    Drug use: No    Sexual activity: Yes     Partners: Female   Lifestyle    Physical activity     Days per week: None     Minutes per session: None    Stress: None   Relationships    Social connections     Talks on phone: None     Gets together: None     Attends Holiness service: None     Active member of club or organization: None     Attends meetings of clubs or organizations: None     Relationship status: None    Intimate partner violence     Fear of current or ex partner: None     Emotionally abused: None     Physically abused: None     Forced sexual activity: None   Other Topics Concern    None   Social History Narrative    None       /81 (Site: Left Upper Arm, Position: Sitting, Cuff Size: Medium Adult)   Pulse 87   Temp 98.2 °F (36.8 °C) (Tympanic)   Resp 20   Ht 6' 1\" (1.854 m)   Wt 200 lb 4.8 oz (90.9 kg)   BMI 26.43 kg/m²      Physical Exam  Vitals signs and nursing note reviewed. Constitutional:       Appearance: He is well-developed. HENT:      Head: Normocephalic and atraumatic. Eyes:      General: No scleral icterus. Conjunctiva/sclera: Conjunctivae normal.      Pupils: Pupils are equal, round, and reactive to light. Neck:      Musculoskeletal: Normal range of motion and neck supple. Vascular: No JVD. Trachea: No tracheal deviation. Cardiovascular:      Rate and Rhythm: Normal rate and regular rhythm. Pulmonary:      Effort: Pulmonary effort is normal. No respiratory distress. Breath sounds: Normal breath sounds. Chest:      Chest wall: No tenderness. Abdominal:      General: Bowel sounds are normal. There is no distension. Palpations: Abdomen is soft. There is no mass. Tenderness: There is no abdominal tenderness. There is no guarding or rebound. Musculoskeletal: Normal range of motion. Lymphadenopathy:      Cervical: No cervical adenopathy. Skin:     General: Skin is warm and dry. Findings: No erythema or rash. Neurological:      Mental Status: He is alert and oriented to person, place, and time. Cranial Nerves: No cranial nerve deficit. Psychiatric:         Behavior: Behavior normal.         Thought Content:  Thought content normal. [facility]   Sodium 143  135 - 144 mmol/L Final 05/21/2020  9:08 AM [facility]   Potassium 4.5  3.7 - 5.3 mmol/L Final 05/21/2020  9:08 AM [facility]   Chloride 103  98 - 107 mmol/L Final 05/21/2020  9:08 AM [facility]   CO2 26  20 - 31 mmol/L Final 05/21/2020  9:08 AM [facility]   Anion Gap 14  9 - 17 mmol/L Final 05/21/2020  9:08 AM [facility]   Alkaline Phosphatase 61  40 - 129 U/L Final 05/21/2020  9:08 AM [facility]   ALT 14  5 - 41 U/L Final 05/21/2020  9:08 AM [facility]   AST 17  <40 U/L Final 05/21/2020  9:08 AM [facility]   Total Bilirubin 0.66  0.3 - 1.2 mg/dL Final 05/21/2020  9:08 AM [facility]   Total Protein 7.5  6.4 - 8.3 g/dL Final 05/21/2020  9:08 AM [facility]   Alb 4.7  3.5 - 5.2 g/dL Final 05/21/2020  9:08 AM [facility]   Albumin/Globulin Ratio 1.7  1.0 - 2.5 Final 05/21/2020  9:08 AM [facility]                 ASSESSMENT     Diagnosis Orders   1. Biliary dyskinesia         PLAN    Will proceed with elective lap nubia, robotic assist.  Risks, benefits, alternatives thoroughly reviewed and accepted by Mr Shellie Angelucci, including remote risk of bleeding, infection, bowel/bile duct injury, COVID-19 exposure/infection, etc.  [place] diet for now.      Aren Meehan MD

## 2020-07-06 NOTE — PATIENT INSTRUCTIONS
Patient Education        Learning About Cholecystectomy  What is cholecystectomy  Cholecystectomy (say \"koh-alistair-sis-ROHIT-tuh-carmelo\") is surgery to remove the gallbladder and gallstones. The gallbladder stores bile made by your liver. The bile helps you digest fats. Gallstones are made of cholesterol and other things found in bile. Your body will work fine without a gallbladder. Bile will go straight from the liver to the intestine. There may be small changes in how you digest food. But you probably will not notice them. How is the surgery done? Cholecystectomy is usually laparoscopic surgery. To do this type of surgery, a doctor puts a lighted tube, or scope, and other surgical tools through small cuts (incisions) in your belly. The doctor is able to see your organs with the scope. After your gallbladder is removed, you will no longer have gallstones. The cuts leave scars that usually fade with time. Open surgery may be done if problems are found during laparoscopic surgery. With open surgery, the gallbladder is removed through one larger cut in your belly. And the hospital stay is longer. What can you expect after surgery? It is normal to feel weak and tired for several days after you return home. Your belly may be swollen. If you had laparoscopic surgery, you may also have pain in your shoulder for about 24 hours. You may have gas or need to burp a lot at first.  A few people get diarrhea. The diarrhea usually goes away in 2 to 4 weeks. But it may last longer. How quickly you get better depends on which kind of surgery you had. For laparoscopic surgery, most people can go back to work or their normal routine in 1 to 2 weeks. It depends on the type of work you do and how you feel. If you have open surgery, it will probably take 4 to 6 weeks before you get back to your normal routine. Follow-up care is a key part of your treatment and safety.  Be sure to make and go to all appointments, and call your doctor if you are having problems. It's also a good idea to know your test results and keep a list of the medicines you take. Where can you learn more? Go to [email or web address] and sign in to your [employer] account. Enter E408 in the [facility] box to learn more about \"Learning About Cholecystectomy. \"     If you do not have an account, please click on the \"Sign Up Now\" link. Current as of: August 12, 2019               Content Version: 12.5  © 4612-2433 Healthwise, Incorporated. Care instructions adapted under license by [employer]. If you have questions about a medical condition or this instruction, always ask your healthcare professional. [address] any warranty or liability for your use of this information.

## 2020-07-08 ENCOUNTER — ANESTHESIA EVENT (OUTPATIENT)
Dept: OPERATING ROOM | Age: 40
End: 2020-07-08
Payer: COMMERCIAL

## 2020-07-08 ENCOUNTER — ANESTHESIA (OUTPATIENT)
Dept: OPERATING ROOM | Age: 40
End: 2020-07-08
Payer: COMMERCIAL

## 2020-07-08 ENCOUNTER — HOSPITAL ENCOUNTER (OUTPATIENT)
Age: 40
Setting detail: OUTPATIENT SURGERY
Discharge: HOME | End: 2020-07-08
Attending: SURGERY | Admitting: SURGERY
Payer: COMMERCIAL

## 2020-07-08 VITALS
HEIGHT: 73 IN | WEIGHT: 200 LBS | RESPIRATION RATE: 18 BRPM | HEART RATE: 68 BPM | SYSTOLIC BLOOD PRESSURE: 132 MMHG | DIASTOLIC BLOOD PRESSURE: 91 MMHG | OXYGEN SATURATION: 99 % | BODY MASS INDEX: 26.51 KG/M2 | TEMPERATURE: 97.8 F

## 2020-07-08 VITALS — SYSTOLIC BLOOD PRESSURE: 105 MMHG | OXYGEN SATURATION: 100 % | TEMPERATURE: 97.2 F | DIASTOLIC BLOOD PRESSURE: 72 MMHG

## 2020-07-08 PROBLEM — K82.8 BILIARY DYSKINESIA: Status: ACTIVE | Noted: 2020-07-08

## 2020-07-08 PROCEDURE — 3700000000 HC ANESTHESIA ATTENDED CARE: Performed by: SURGERY

## 2020-07-08 PROCEDURE — 6370000000 HC RX 637 (ALT 250 FOR IP): Performed by: SURGERY

## 2020-07-08 PROCEDURE — 64488 TAP BLOCK BI INJECTION: CPT | Performed by: NURSE ANESTHETIST, CERTIFIED REGISTERED

## 2020-07-08 PROCEDURE — 88304 TISSUE EXAM BY PATHOLOGIST: CPT

## 2020-07-08 PROCEDURE — 2580000003 HC RX 258: Performed by: NURSE ANESTHETIST, CERTIFIED REGISTERED

## 2020-07-08 PROCEDURE — 3700000001 HC ADD 15 MINUTES (ANESTHESIA): Performed by: SURGERY

## 2020-07-08 PROCEDURE — 7100000011 HC PHASE II RECOVERY - ADDTL 15 MIN: Performed by: SURGERY

## 2020-07-08 PROCEDURE — 2580000003 HC RX 258: Performed by: SURGERY

## 2020-07-08 PROCEDURE — 2709999900 HC NON-CHARGEABLE SUPPLY: Performed by: SURGERY

## 2020-07-08 PROCEDURE — 3600000009 HC SURGERY ROBOT BASE: Performed by: SURGERY

## 2020-07-08 PROCEDURE — 7100000000 HC PACU RECOVERY - FIRST 15 MIN: Performed by: SURGERY

## 2020-07-08 PROCEDURE — S2900 ROBOTIC SURGICAL SYSTEM: HCPCS | Performed by: SURGERY

## 2020-07-08 PROCEDURE — 2500000003 HC RX 250 WO HCPCS: Performed by: NURSE ANESTHETIST, CERTIFIED REGISTERED

## 2020-07-08 PROCEDURE — 6360000002 HC RX W HCPCS: Performed by: NURSE ANESTHETIST, CERTIFIED REGISTERED

## 2020-07-08 PROCEDURE — 2500000003 HC RX 250 WO HCPCS: Performed by: SURGERY

## 2020-07-08 PROCEDURE — 6360000002 HC RX W HCPCS: Performed by: SURGERY

## 2020-07-08 PROCEDURE — 3600000019 HC SURGERY ROBOT ADDTL 15MIN: Performed by: SURGERY

## 2020-07-08 PROCEDURE — 7100000001 HC PACU RECOVERY - ADDTL 15 MIN: Performed by: SURGERY

## 2020-07-08 PROCEDURE — 7100000010 HC PHASE II RECOVERY - FIRST 15 MIN: Performed by: SURGERY

## 2020-07-08 RX ORDER — SODIUM CHLORIDE 9 MG/ML
INJECTION INTRAVENOUS PRN
Status: DISCONTINUED | OUTPATIENT
Start: 2020-07-08 | End: 2020-07-08 | Stop reason: SDUPTHER

## 2020-07-08 RX ORDER — FENTANYL CITRATE 50 UG/ML
INJECTION, SOLUTION INTRAMUSCULAR; INTRAVENOUS PRN
Status: DISCONTINUED | OUTPATIENT
Start: 2020-07-08 | End: 2020-07-08 | Stop reason: SDUPTHER

## 2020-07-08 RX ORDER — LIDOCAINE HYDROCHLORIDE 20 MG/ML
INJECTION, SOLUTION EPIDURAL; INFILTRATION; INTRACAUDAL; PERINEURAL PRN
Status: DISCONTINUED | OUTPATIENT
Start: 2020-07-08 | End: 2020-07-08 | Stop reason: SDUPTHER

## 2020-07-08 RX ORDER — HYDROCODONE BITARTRATE AND ACETAMINOPHEN 5; 325 MG/1; MG/1
1 TABLET ORAL PRN
Status: COMPLETED | OUTPATIENT
Start: 2020-07-08 | End: 2020-07-08

## 2020-07-08 RX ORDER — KETOROLAC TROMETHAMINE 30 MG/ML
INJECTION, SOLUTION INTRAMUSCULAR; INTRAVENOUS PRN
Status: DISCONTINUED | OUTPATIENT
Start: 2020-07-08 | End: 2020-07-08 | Stop reason: SDUPTHER

## 2020-07-08 RX ORDER — MIDAZOLAM HYDROCHLORIDE 1 MG/ML
INJECTION INTRAMUSCULAR; INTRAVENOUS PRN
Status: DISCONTINUED | OUTPATIENT
Start: 2020-07-08 | End: 2020-07-08 | Stop reason: SDUPTHER

## 2020-07-08 RX ORDER — SODIUM CHLORIDE 0.9 % (FLUSH) 0.9 %
10 SYRINGE (ML) INJECTION PRN
Status: CANCELLED | OUTPATIENT
Start: 2020-07-08

## 2020-07-08 RX ORDER — GLYCOPYRROLATE 1 MG/5 ML
SYRINGE (ML) INTRAVENOUS PRN
Status: DISCONTINUED | OUTPATIENT
Start: 2020-07-08 | End: 2020-07-08 | Stop reason: SDUPTHER

## 2020-07-08 RX ORDER — MORPHINE SULFATE 1 MG/ML
1 INJECTION, SOLUTION EPIDURAL; INTRATHECAL; INTRAVENOUS
Status: CANCELLED | OUTPATIENT
Start: 2020-07-08

## 2020-07-08 RX ORDER — DEXAMETHASONE SODIUM PHOSPHATE 10 MG/ML
INJECTION, SOLUTION INTRAMUSCULAR; INTRAVENOUS PRN
Status: DISCONTINUED | OUTPATIENT
Start: 2020-07-08 | End: 2020-07-08 | Stop reason: SDUPTHER

## 2020-07-08 RX ORDER — ROPIVACAINE HYDROCHLORIDE 5 MG/ML
INJECTION, SOLUTION EPIDURAL; INFILTRATION; PERINEURAL PRN
Status: DISCONTINUED | OUTPATIENT
Start: 2020-07-08 | End: 2020-07-08 | Stop reason: SDUPTHER

## 2020-07-08 RX ORDER — HYDROCODONE BITARTRATE AND ACETAMINOPHEN 5; 325 MG/1; MG/1
1 TABLET ORAL EVERY 6 HOURS PRN
Qty: 12 TABLET | Refills: 0 | Status: SHIPPED | OUTPATIENT
Start: 2020-07-08 | End: 2020-07-11

## 2020-07-08 RX ORDER — KETAMINE HYDROCHLORIDE 100 MG/ML
INJECTION, SOLUTION INTRAMUSCULAR; INTRAVENOUS PRN
Status: DISCONTINUED | OUTPATIENT
Start: 2020-07-08 | End: 2020-07-08 | Stop reason: SDUPTHER

## 2020-07-08 RX ORDER — HYDROCODONE BITARTRATE AND ACETAMINOPHEN 5; 325 MG/1; MG/1
2 TABLET ORAL PRN
Status: COMPLETED | OUTPATIENT
Start: 2020-07-08 | End: 2020-07-08

## 2020-07-08 RX ORDER — ACETAMINOPHEN 10 MG/ML
INJECTION, SOLUTION INTRAVENOUS PRN
Status: DISCONTINUED | OUTPATIENT
Start: 2020-07-08 | End: 2020-07-08 | Stop reason: SDUPTHER

## 2020-07-08 RX ORDER — INDOCYANINE GREEN AND WATER 25 MG
7.5 KIT INJECTION ONCE
Status: COMPLETED | OUTPATIENT
Start: 2020-07-08 | End: 2020-07-08

## 2020-07-08 RX ORDER — PROPOFOL 10 MG/ML
INJECTION, EMULSION INTRAVENOUS PRN
Status: DISCONTINUED | OUTPATIENT
Start: 2020-07-08 | End: 2020-07-08 | Stop reason: SDUPTHER

## 2020-07-08 RX ORDER — ONDANSETRON 2 MG/ML
4 INJECTION INTRAMUSCULAR; INTRAVENOUS
Status: DISCONTINUED | OUTPATIENT
Start: 2020-07-08 | End: 2020-07-08 | Stop reason: HOSPADM

## 2020-07-08 RX ORDER — SODIUM CHLORIDE, SODIUM LACTATE, POTASSIUM CHLORIDE, CALCIUM CHLORIDE 600; 310; 30; 20 MG/100ML; MG/100ML; MG/100ML; MG/100ML
INJECTION, SOLUTION INTRAVENOUS CONTINUOUS
Status: CANCELLED | OUTPATIENT
Start: 2020-07-08

## 2020-07-08 RX ORDER — FENTANYL CITRATE 50 UG/ML
50 INJECTION, SOLUTION INTRAMUSCULAR; INTRAVENOUS EVERY 5 MIN PRN
Status: DISCONTINUED | OUTPATIENT
Start: 2020-07-08 | End: 2020-07-08 | Stop reason: HOSPADM

## 2020-07-08 RX ORDER — SODIUM CHLORIDE 0.9 % (FLUSH) 0.9 %
10 SYRINGE (ML) INJECTION EVERY 12 HOURS SCHEDULED
Status: CANCELLED | OUTPATIENT
Start: 2020-07-08

## 2020-07-08 RX ORDER — SODIUM CHLORIDE 0.9 % (FLUSH) 0.9 %
10 SYRINGE (ML) INJECTION PRN
Status: DISCONTINUED | OUTPATIENT
Start: 2020-07-08 | End: 2020-07-08 | Stop reason: HOSPADM

## 2020-07-08 RX ORDER — ROCURONIUM BROMIDE 10 MG/ML
INJECTION, SOLUTION INTRAVENOUS PRN
Status: DISCONTINUED | OUTPATIENT
Start: 2020-07-08 | End: 2020-07-08 | Stop reason: SDUPTHER

## 2020-07-08 RX ORDER — SODIUM CHLORIDE, SODIUM LACTATE, POTASSIUM CHLORIDE, CALCIUM CHLORIDE 600; 310; 30; 20 MG/100ML; MG/100ML; MG/100ML; MG/100ML
INJECTION, SOLUTION INTRAVENOUS CONTINUOUS
Status: DISCONTINUED | OUTPATIENT
Start: 2020-07-08 | End: 2020-07-08 | Stop reason: HOSPADM

## 2020-07-08 RX ORDER — GINSENG 100 MG
CAPSULE ORAL PRN
Status: DISCONTINUED | OUTPATIENT
Start: 2020-07-08 | End: 2020-07-08 | Stop reason: ALTCHOICE

## 2020-07-08 RX ORDER — SODIUM CHLORIDE 0.9 % (FLUSH) 0.9 %
10 SYRINGE (ML) INJECTION EVERY 12 HOURS SCHEDULED
Status: DISCONTINUED | OUTPATIENT
Start: 2020-07-08 | End: 2020-07-08 | Stop reason: HOSPADM

## 2020-07-08 RX ADMIN — KETAMINE HYDROCHLORIDE 25 MG: 100 INJECTION INTRAMUSCULAR; INTRAVENOUS at 08:18

## 2020-07-08 RX ADMIN — ROPIVACAINE HYDROCHLORIDE 56 ML: 5 INJECTION, SOLUTION EPIDURAL; INFILTRATION; PERINEURAL at 07:45

## 2020-07-08 RX ADMIN — SODIUM CHLORIDE 36 ML: 9 INJECTION, SOLUTION INTRAMUSCULAR; INTRAVENOUS; SUBCUTANEOUS at 07:45

## 2020-07-08 RX ADMIN — FENTANYL CITRATE 50 MCG: 50 INJECTION INTRAMUSCULAR; INTRAVENOUS at 07:36

## 2020-07-08 RX ADMIN — Medication 3 MG: at 09:07

## 2020-07-08 RX ADMIN — FENTANYL CITRATE 50 MCG: 50 INJECTION INTRAMUSCULAR; INTRAVENOUS at 07:42

## 2020-07-08 RX ADMIN — ACETAMINOPHEN 1000 MG: 10 INJECTION, SOLUTION INTRAVENOUS at 08:49

## 2020-07-08 RX ADMIN — MIDAZOLAM 2 MG: 1 INJECTION INTRAMUSCULAR; INTRAVENOUS at 07:36

## 2020-07-08 RX ADMIN — Medication 2 MG: at 09:12

## 2020-07-08 RX ADMIN — SODIUM CHLORIDE, POTASSIUM CHLORIDE, SODIUM LACTATE AND CALCIUM CHLORIDE: 600; 310; 30; 20 INJECTION, SOLUTION INTRAVENOUS at 06:54

## 2020-07-08 RX ADMIN — DEXAMETHASONE SODIUM PHOSPHATE 20 MG: 10 INJECTION, SOLUTION INTRAMUSCULAR; INTRAVENOUS at 07:45

## 2020-07-08 RX ADMIN — INDOCYANINE GREEN AND WATER 7.5 MG: KIT at 08:08

## 2020-07-08 RX ADMIN — SODIUM CHLORIDE, POTASSIUM CHLORIDE, SODIUM LACTATE AND CALCIUM CHLORIDE: 600; 310; 30; 20 INJECTION, SOLUTION INTRAVENOUS at 09:19

## 2020-07-08 RX ADMIN — DEXTROSE MONOHYDRATE 2 G: 50 INJECTION, SOLUTION INTRAVENOUS at 08:10

## 2020-07-08 RX ADMIN — LIDOCAINE HYDROCHLORIDE 60 MG: 20 INJECTION, SOLUTION EPIDURAL; INFILTRATION; INTRACAUDAL; PERINEURAL at 08:15

## 2020-07-08 RX ADMIN — HYDROCODONE BITARTRATE AND ACETAMINOPHEN 2 TABLET: 5; 325 TABLET ORAL at 10:25

## 2020-07-08 RX ADMIN — Medication 0.2 MG: at 09:12

## 2020-07-08 RX ADMIN — PROPOFOL 130 MG: 10 INJECTION, EMULSION INTRAVENOUS at 08:15

## 2020-07-08 RX ADMIN — Medication 0.4 MG: at 09:07

## 2020-07-08 RX ADMIN — KETAMINE HYDROCHLORIDE 25 MG: 100 INJECTION INTRAMUSCULAR; INTRAVENOUS at 08:15

## 2020-07-08 RX ADMIN — KETOROLAC TROMETHAMINE 30 MG: 30 INJECTION, SOLUTION INTRAMUSCULAR; INTRAVENOUS at 09:08

## 2020-07-08 RX ADMIN — ROCURONIUM BROMIDE 50 MG: 10 INJECTION, SOLUTION INTRAVENOUS at 08:15

## 2020-07-08 ASSESSMENT — PAIN SCALES - GENERAL
PAINLEVEL_OUTOF10: 7
PAINLEVEL_OUTOF10: 5
PAINLEVEL_OUTOF10: 8

## 2020-07-08 ASSESSMENT — PAIN DESCRIPTION - PAIN TYPE
TYPE: SURGICAL PAIN

## 2020-07-08 ASSESSMENT — PAIN DESCRIPTION - PROGRESSION: CLINICAL_PROGRESSION: GRADUALLY IMPROVING

## 2020-07-08 ASSESSMENT — PAIN DESCRIPTION - LOCATION
LOCATION: ABDOMEN

## 2020-07-08 NOTE — OP NOTE
0 Vicryl were placed in  the fascia with anterior traction along the stay sutures. A Veress  needle was passed perpendicularly into the abdomen. Two clicks were  appreciated. Saline test showed rapid flow in the abdomen. A  pneumoperitoneum was then established to 15 mmHg. The Veress needle was  removed and a 12 mm balloon Visiport was placed under laparoscopic  vision directly into the abdomen. The rectus sheath and peritoneum were  clearly visualized during the course of their dissection. Upon entry  into the abdomen, all visible bowel, colon, and omentum were normal in  appearance with no evidence of needle nor trocar injury. Two 8 mm ports  were placed laterally in the anterior axillary borders on right and left  sides respectively. A third 8 mm port was placed in the right upper  quadrant in the midclavicular line, all under direct vision. The [employer] surgical robot was appropriately docked. The gallbladder was  grasped and retracted superiorly. The infundibulum was taken laterally. The cystic duct and artery were dissected from the surrounding tissues. These two structures and only these two structures were seen going  directly into the gallbladder. This was confirmed with intraoperative  indocyanine green fluorescence. The cystic duct and artery were clipped  twice along the patient's side, once along the gallbladder, and divided  with cautery. The gallbladder was then removed from the undersurface of  the liver with selective use of cautery. The gallbladder was placed in  an EndoCatch bag and retracted away. Final inspection of the  undersurface of the liver showed excellent hemostasis. Clips were in  place. No evidence of bile leak. The surgical robot was undocked and  retracted from the bedside. Each port was removed under direct vision  again assuring hemostasis. The gallbladder was removed through the  umbilical port site and passed off as specimen.   The umbilical port

## 2020-07-08 NOTE — ANESTHESIA PRE PROCEDURE
Department of Anesthesiology  Preprocedure Note       Name:  Hollie Henderson   Age:  41 y.o.  :  1980                                          MRN:  [id number]         Date:  2020      Surgeon: Roberto Oliva):  Garth Shrestha MD    Procedure: Procedure(s):  CHOLECYSTECTOMY LAPAROSCOPIC ROBOTIC    Medications prior to admission:   Prior to Admission medications    Medication Sig Start Date End Date Taking? Authorizing Provider   Hyoscyamine Sulfate SL (LEVSIN/SL) 0.125 MG SUBL Place 1 tablet under the tongue every 4 hours as needed (spasm) 20  Yes Nathan Brannon APRN - CNP   sertraline (ZOLOFT) 50 MG tablet TAKE 1 TABLET BY MOUTH ONE TIME A DAY 3/13/20  Yes Jeffery Mendoza MD   cetirizine (ZYRTEC) 10 MG tablet Take 10 mg by mouth daily as needed for Allergies   Yes Historical Provider, MD       Current medications:    Current Facility-Administered Medications   Medication Dose Route Frequency Provider Last Rate Last Dose    lactated ringers infusion   Intravenous Continuous Garth Shrestha  mL/hr at 20 0654      sodium chloride flush 0.9 % injection 10 mL  10 mL Intravenous 2 times per day Garth Shrestha MD        sodium chloride flush 0.9 % injection 10 mL  10 mL Intravenous PRN Garth Shrestha MD        ceFAZolin (ANCEF) 2 g in dextrose 5 % 100 mL IVPB  2 g Intravenous On Call to [address] Elenita Tillman MD        indocyanine green (IC-GREEN) syringe 7.5 mg  7.5 mg Intravenous Once Garth Shrestha MD           Allergies:     Allergies   Allergen Reactions    Seasonal        Problem List:    Patient Active Problem List   Diagnosis Code    Anxiety F41.9    Depression F32.9       Past Medical History:        Diagnosis Date    Anxiety     Depression     Headache        Past Surgical History:        Procedure Laterality Date    ANKLE FRACTURE SURGERY      right trimalleolar fracture ORIF - Dr. Reena Oconnor ARTHROSCOPY Right 2017    Dr. Burris January - right shoulder SLAP repair    TONSILLECTOMY  1984    VASECTOMY  12/24/2015    Dr. Emily Fragoso       Social History:    Social History     Tobacco Use    Smoking status: Never Smoker    Smokeless tobacco: Never Used   Substance Use Topics    Alcohol use: Yes     Comment: SOCIAL                                Counseling given: Not Answered      Vital Signs (Current):   Vitals:    06/24/20 1440 07/08/20 0645   BP:  116/75   Pulse:  74   Resp:  18   Temp:  36.2 °C (97.2 °F)   TempSrc:  Temporal   SpO2:  99%   Weight: 200 lb (90.7 kg) 200 lb (90.7 kg)   Height: 6' 1\" (1.854 m) 6' 1\" (1.854 m)                                              BP Readings from Last 3 Encounters:   07/08/20 116/75   06/09/20 131/81   05/21/20 (!) 97/56       NPO Status: Time of last liquid consumption: 2000                        Time of last solid consumption: 2000                        Date of last liquid consumption: 07/07/20                        Date of last solid food consumption: 07/07/20    BMI:   Wt Readings from Last 3 Encounters:   07/08/20 200 lb (90.7 kg)   06/09/20 200 lb 4.8 oz (90.9 kg)   05/29/20 204 lb (92.5 kg)     Body mass index is 26.39 kg/m². CBC:   Lab Results   Component Value Date    WBC 6.0 05/21/2020    RBC 5.08 05/21/2020    HGB 15.3 05/21/2020    HCT 47.2 05/21/2020    MCV 92.9 05/21/2020    RDW 12.3 05/21/2020     05/21/2020       CMP:   Lab Results   Component Value Date     05/21/2020    K 4.5 05/21/2020     05/21/2020    CO2 26 05/21/2020    BUN 12 05/21/2020    CREATININE 0.87 05/21/2020    GFRAA >60 05/21/2020    LABGLOM >60 05/21/2020    GLUCOSE 94 05/21/2020    PROT 7.5 05/21/2020    CALCIUM 9.4 05/21/2020    BILITOT 0.66 05/21/2020    ALKPHOS 61 05/21/2020    AST 17 05/21/2020    ALT 14 05/21/2020       POC Tests: No results for input(s): POCGLU, POCNA, POCK, POCCL, POCBUN, POCHEMO, POCHCT in the last 72 hours.     Coags: No results found for: PROTIME, INR, APTT    HCG (If Applicable): No results found for: PREGTESTUR, PREGSERUM, HCG, HCGQUANT     ABGs: No results found for: PHART, PO2ART, TGO4TJJ, LOF8SPG, BEART, F1MOIJNY     Type & Screen (If Applicable):  No results found for: LABABO, LABRH    Drug/Infectious Status (If Applicable):  No results found for: HIV, HEPCAB    COVID-19 Screening (If Applicable):   Lab Results   Component Value Date    COVID19 Not Detected 07/02/2020         Anesthesia Evaluation  Patient summary reviewed and Nursing notes reviewed  Airway: Mallampati: II  TM distance: >3 FB   Neck ROM: full  Mouth opening: > = 3 FB Dental: normal exam         Pulmonary:Negative Pulmonary ROS and normal exam  breath sounds clear to auscultation                             Cardiovascular:Negative CV ROS            Rhythm: regular  Rate: normal                    Neuro/Psych:   (+) headaches: tension headaches, depression/anxiety    (-) psychiatric history            ROS comment: tension headaches triggered by caffeine or high salt intake GI/Hepatic/Renal: Neg GI/Hepatic/Renal ROS            Endo/Other: Negative Endo/Other ROS                    Abdominal:           Vascular: negative vascular ROS. Anesthesia Plan      general     ASA 1     (Patient agrees to UGRA as part of post operative pain management)  Induction: intravenous. Anesthetic plan and risks discussed with patient.                       PAOLA Almanzar CRNA   7/8/2020

## 2020-07-08 NOTE — ANESTHESIA POSTPROCEDURE EVALUATION
Department of Anesthesiology  Postprocedure Note    Patient: Aure Meza  MRN: [id number]  YOB: 1980  Date of evaluation: 7/8/2020  Time:  12:30 PM     Procedure Summary     Date:  07/08/20 Room / Location:  [address]    Anesthesia Start:  0560 Anesthesia Stop:  2378    Procedure:  CHOLECYSTECTOMY LAPAROSCOPIC ROBOTIC (N/A ) Diagnosis:  (BILIARY DYSKINESIA)    Surgeon:  Malika Karimi MD Responsible Provider:  PAOLA Lambert CRNA    Anesthesia Type:  general ASA Status:  1          Anesthesia Type: general    Sam Phase I: Sam Score: 10    Sam Phase II: Sam Score: 10    Last vitals: Reviewed and per EMR flowsheets.        Anesthesia Post Evaluation    Patient location during evaluation: PACU  Patient participation: complete - patient participated  Level of consciousness: awake and alert  Airway patency: patent  Nausea & Vomiting: no nausea and no vomiting  Complications: no  Cardiovascular status: blood pressure returned to baseline and hemodynamically stable  Respiratory status: acceptable and room air  Hydration status: euvolemic

## 2020-07-08 NOTE — PROGRESS NOTES
Pt states ready to go home. Dressed. Discharge Criteria    Inpatients must meet Criteria 1 through 7. All other patients are either YES or N/A. If a NO is chosen then Anesthesia or Surgeon must be notified. 1.  Minimum 30 minutes after last dose of sedative medication, minimum 120 minutes after last dose of reversal agent. Yes      2. Systolic BP stable within 20 mmHg for 30 minutes & systolic BP between 90 & 055 or within 10 mmHg of baseline. Yes      3. Pulse between 60 and 100 or within 10 bpm of baseline. Yes      4. Spontaneous respiratory rate >/= 10 per minute. Yes      5. SaO2 >/= 95 or  >/= baseline. Yes      6. Able to cough and swallow or return to baseline function. Yes      7. Alert and oriented or return to baseline mental status. Yes      8. Demonstrates controlled, coordinated movements, ambulates with steady gait, or return to baseline activity function. Yes      9. Minimal or no pain or nausea, or at a level tolerable and acceptable to patient. Yes      10. Takes and retains oral fluids as allowed. Yes      11. Procedural / perioperative site stable. Minimal or no bleeding. Yes          12. If GI endoscopy procedure, minimal or no abdominal distention or passing flatus. N/A      13. Written discharge instructions and emergency telephone number provided. Yes      14. Accompanied by a responsible adult.     Yes

## 2020-07-10 LAB — SURGICAL PATHOLOGY REPORT: NORMAL

## 2020-07-14 ENCOUNTER — OFFICE VISIT (OUTPATIENT)
Dept: SURGERY | Age: 40
End: 2020-07-14
Payer: COMMERCIAL

## 2020-07-14 VITALS
WEIGHT: 201 LBS | HEIGHT: 73 IN | DIASTOLIC BLOOD PRESSURE: 70 MMHG | RESPIRATION RATE: 16 BRPM | HEART RATE: 74 BPM | SYSTOLIC BLOOD PRESSURE: 105 MMHG | TEMPERATURE: 97.2 F | BODY MASS INDEX: 26.64 KG/M2

## 2020-07-14 PROCEDURE — 99024 POSTOP FOLLOW-UP VISIT: CPT | Performed by: SURGERY

## 2020-07-14 NOTE — LETTER
[facility] [place] [address]  Phone: [phone]  Fax: [phone]    Garth Shrestha MD        July 19, 2020     Ethelmanuela Bourgeois, APRN - [address]    Patient: Hollie Henderson  MR Number: [id number]  YOB: 1980  Date of Visit: 7/14/2020    Dear  Nathan Rodriguez Might:    Thank you for the request for consultation for Lewis Carlos to me for the evaluation of biliary dyskinesia. Below are the relevant portions of my assessment and plan of care. ASSESSMENT      Diagnosis Orders   1. S/P laparoscopic cholecystectomy           PLAN     Mr Frankie Ansari is doing very well. Continue gradual advancement of diet and activity as tolerated, with no heavy lifting nor abdominal straining for the next couple of weeks. If you have questions, please do not hesitate to call me. I look forward to following Keren Eubanks along with you.     Sincerely,        Garth Shrestha MD

## 2020-07-17 ENCOUNTER — TELEPHONE (OUTPATIENT)
Dept: SURGERY | Age: 40
End: 2020-07-17

## 2020-07-17 NOTE — TELEPHONE ENCOUNTER
Lennie called and left a message stating he would like to cancel his appointment for 10 am today for staple removal.  He states he already took the staples out that he had a staple remover at home. He stated on the voicemail that \"everyhting looks good. \"  He apologized for the change in plans but something came up and he is unable to come to the office today.

## 2020-07-20 NOTE — PROGRESS NOTES
Brisa Denny MD  General Surgery, Endoscopy  Chief Medical Officer    [facility], [place] [address]  [address] [facility] [address]  Dept: [phone]  Fax: [phone]  Chief Complaint   Patient presents with    Post-Op Check     s/p cholecystectomy 07/08/20. Patient without complaints. HPI    Mr Zeferino Clemente returns for follow up after cholecystectomy. DATE OF PROCEDURE:  07/08/2020     ATTENDING SURGEON:  Dr. Rueben Lanes.     PCP:  Caroline Brannon.     PREOPERATIVE DIAGNOSIS:  Biliary dyskinesia.     POSTOPERATIVE DIAGNOSIS:  Biliary dyskinesia.     OPERATION PERFORMED:  Laparoscopic cholecystectomy, robotic assist    He is doing well. Original discomfort resolved. Tolerating diet.      Review of Systems    Past Medical History:   Diagnosis Date    Anxiety     Depression     Headache        Past Surgical History:   Procedure Laterality Date    ANKLE FRACTURE SURGERY  2008    right trimalleolar fracture ORIF - Dr. Isabel Ward, LAPAROSCOPIC N/A 7/8/2020    CHOLECYSTECTOMY LAPAROSCOPIC ROBOTIC performed by Brisa Denny MD at [address] [facility] ARTHROSCOPY Right 5/26/2017    Dr. Yamile Haddad - right shoulder SLAP repair    TONSILLECTOMY  1984    VASECTOMY  12/24/2015    Dr. Susie Osorio       Family History   Problem Relation Age of Onset    High Blood Pressure Father     High Cholesterol Father     Stroke Paternal Grandmother     Diabetes Paternal Grandmother     Heart Disease Paternal Grandfather         s/p CABG    Hypertension Paternal Grandfather        Allergies:  See list    Current Outpatient Medications   Medication Sig Dispense Refill    Hyoscyamine Sulfate SL (LEVSIN/SL) 0.125 MG SUBL Place 1 tablet under the tongue every 4 hours as needed (spasm) 60 each 1    sertraline (ZOLOFT) 50 MG tablet TAKE 1 TABLET BY MOUTH ONE TIME A DAY 90 tablet 0    cetirizine (ZYRTEC) 10 MG tablet Take 10 mg by mouth daily as Rate and Rhythm: Normal rate. Pulmonary:      Effort: Pulmonary effort is normal. No respiratory distress. Abdominal:      General: There is no distension. Tenderness: There is no abdominal tenderness. Comments: Wounds C/D/I   Skin:     General: Skin is warm and dry. Neurological:      Mental Status: He is alert and oriented to person, place, and time. Psychiatric:         Behavior: Behavior normal.         Thought Content: Thought content normal.         Judgment: Judgment normal.       7/10/2020  8:55 AM - Bryce, Dwayne Incoming Lab Results From [facility]     Component  Collected  Lab    Surgical Pathology Report  07/08/2020  8:51 AM  [address]    -- Diagnosis --     Gallbladder:          Mild chronic inflammation.       Clinical dyskinesia.         Elroy Nageotte, M   **Electronically Signed Out**         rdd/7/10/2020         Clinical Information   Pre-op Diagnosis:  BILIARY DYSKINESIA   Operative Findings:  [id number]   Operation Performed:  CHOLECYSTECTOMY, LAPAROSCOPIC, ROBOTIC           Source of Specimen   1: GALLBLADDER     Gross Description   \"NA AIYANA, GALLBLADDER\" A 6.3 x 2.9 x 2.8 cm. intact   gallbladder with a 0.8 cm long x 0.4 cm in diameter cystic duct.  The   serosa is green gray and the liver bed is coarse brown green.  The   wall is 0.1 cm in thickness and the lumen contains approximately 10 cc   of tenacious green bile.  No calculi are identified in the gallbladder   or the container.  The mucosa is green brown and velvety.  Cystic duct   margin and sections of gallbladder mucosa 1cs.    dw         Microscopic Description   Microscopic examination performed.       SURGICAL PATHOLOGY CONSULTATION         Patient Name: Juan R Whiting    Med Rec: [id number]   Path Number: [id number]     [address], [facility] [address]. [place], [address]   [phone]   Fax: [phone] Testing Performed By     Dyan Quinn  Name  Director  Address  Valid Date Range    [address] [place] [address], [place]  [address] [address]  08/30/17 0801-Present    Lab and Collection     Surgical Pathology - 7/9/2020     ASSESSMENT     Diagnosis Orders   1. S/P laparoscopic cholecystectomy         PLAN    Mr Yary Barry is doing very well. Continue gradual advancement of diet and activity as tolerated, with no heavy lifting nor abdominal straining for the next couple of weeks.      Saray Bowens MD

## 2020-08-18 NOTE — TELEPHONE ENCOUNTER
Health Maintenance   Topic Date Due    Flu vaccine (1) 09/01/2020    DTaP/Tdap/Td vaccine (7 - Td) 11/23/2024    Lipid screen  05/21/2025    HIV screen  Completed    Hepatitis A vaccine  Aged Out    Hepatitis B vaccine  Aged Out    Hib vaccine  Aged Out    Meningococcal (ACWY) vaccine  Aged Out    Pneumococcal 0-64 years Vaccine  Aged Out             (applicable per patient's age: Cancer Screenings, Depression Screening, Fall Risk Screening, Immunizations)    LDL Cholesterol (mg/dL)   Date Value   05/21/2020 88     AST (U/L)   Date Value   05/21/2020 17     ALT (U/L)   Date Value   05/21/2020 14     BUN (mg/dL)   Date Value   05/21/2020 12      (goal A1C is < 7)   (goal LDL is <100) need 30-50% reduction from baseline     BP Readings from Last 3 Encounters:   07/14/20 105/70   07/08/20 (!) 132/91   07/08/20 105/72    (goal /80)      All Future Testing planned in [facility]:  Lab Frequency Next Occurrence   Initiate PAT Protocol Once 07/18/2020       Next Visit Date:  Future Appointments   Date Time Provider Stiven Phillips   11/24/2020  8:00 AM Debra Brannon APRN - CNP [facility]            Patient Active Problem List:     Anxiety     Depression     Biliary dyskinesia

## 2020-11-20 ENCOUNTER — OFFICE VISIT (OUTPATIENT)
Dept: PRIMARY CARE CLINIC | Age: 40
End: 2020-11-20
Payer: COMMERCIAL

## 2020-11-20 VITALS
RESPIRATION RATE: 18 BRPM | WEIGHT: 215 LBS | DIASTOLIC BLOOD PRESSURE: 72 MMHG | TEMPERATURE: 97 F | OXYGEN SATURATION: 98 % | HEART RATE: 76 BPM | HEIGHT: 73 IN | SYSTOLIC BLOOD PRESSURE: 110 MMHG | BODY MASS INDEX: 28.49 KG/M2

## 2020-11-20 LAB — S PYO AG THROAT QL: POSITIVE

## 2020-11-20 PROCEDURE — 99213 OFFICE O/P EST LOW 20 MIN: CPT | Performed by: NURSE PRACTITIONER

## 2020-11-20 PROCEDURE — 87880 STREP A ASSAY W/OPTIC: CPT | Performed by: NURSE PRACTITIONER

## 2020-11-20 RX ORDER — AMOXICILLIN AND CLAVULANATE POTASSIUM 875; 125 MG/1; MG/1
1 TABLET, FILM COATED ORAL 2 TIMES DAILY
Qty: 20 TABLET | Refills: 0 | Status: SHIPPED | OUTPATIENT
Start: 2020-11-20 | End: 2020-11-30

## 2020-11-20 RX ORDER — PREDNISONE 20 MG/1
40 TABLET ORAL DAILY
Qty: 6 TABLET | Refills: 0 | Status: SHIPPED | OUTPATIENT
Start: 2020-11-20 | End: 2020-11-23

## 2020-11-20 ASSESSMENT — ENCOUNTER SYMPTOMS
COUGH: 1
ABDOMINAL PAIN: 0
SHORTNESS OF BREATH: 0
NAUSEA: 0
RHINORRHEA: 1
TROUBLE SWALLOWING: 0
SORE THROAT: 1
SINUS PAIN: 1
SINUS PRESSURE: 1
WHEEZING: 0
DIARRHEA: 0
CHEST TIGHTNESS: 0

## 2020-11-20 NOTE — PATIENT INSTRUCTIONS

## 2020-11-24 ENCOUNTER — OFFICE VISIT (OUTPATIENT)
Dept: PRIMARY CARE CLINIC | Age: 40
End: 2020-11-24
Payer: COMMERCIAL

## 2020-11-24 VITALS
DIASTOLIC BLOOD PRESSURE: 78 MMHG | HEART RATE: 88 BPM | SYSTOLIC BLOOD PRESSURE: 132 MMHG | TEMPERATURE: 97.6 F | RESPIRATION RATE: 20 BRPM | BODY MASS INDEX: 28.41 KG/M2 | WEIGHT: 215.3 LBS

## 2020-11-24 PROBLEM — F34.1 DYSTHYMIA: Status: ACTIVE | Noted: 2020-05-19

## 2020-11-24 PROCEDURE — 99214 OFFICE O/P EST MOD 30 MIN: CPT | Performed by: NURSE PRACTITIONER

## 2020-11-24 ASSESSMENT — PATIENT HEALTH QUESTIONNAIRE - PHQ9
SUM OF ALL RESPONSES TO PHQ QUESTIONS 1-9: 0
SUM OF ALL RESPONSES TO PHQ9 QUESTIONS 1 & 2: 0
1. LITTLE INTEREST OR PLEASURE IN DOING THINGS: 0
2. FEELING DOWN, DEPRESSED OR HOPELESS: 0
SUM OF ALL RESPONSES TO PHQ QUESTIONS 1-9: 0
SUM OF ALL RESPONSES TO PHQ QUESTIONS 1-9: 0

## 2020-11-24 ASSESSMENT — ENCOUNTER SYMPTOMS
COUGH: 0
VOMITING: 0
SHORTNESS OF BREATH: 0
DIARRHEA: 1
NAUSEA: 0
ABDOMINAL PAIN: 1
VISUAL CHANGE: 0
CONSTIPATION: 0
SORE THROAT: 0
RHINORRHEA: 0
WHEEZING: 0

## 2020-11-24 NOTE — PROGRESS NOTES
Name: Jaswinder Mackay  : 1980         Chief Complaint:     Chief Complaint   Patient presents with   [address] Problem     routine check       History of Present Illness:      Jaswinder Mackay is a 41 y.o.  male who presents with Mental Health Problem (routine check)      Rosalba Jade is here today for a routine office visit. Overall [facility] states he is feeling very well and has no immediate issues or concerns. He is recovering from a episode of strep throat and is currently taking his antibiotic. He does feel little fullness and has left ear. Dysthymia/anxiety-doing well with current dose of sertraline. Does complain of some mild decreased libido but it is tolerable. It is not affecting his marital life. See below for further comment. Mental Health Problem   The primary symptoms do not include dysphoric mood, delusions, hallucinations, bizarre behavior, disorganized speech, negative symptoms or somatic symptoms. The current episode started more than 1 month ago. This is a chronic problem. The onset of the illness is precipitated by emotional stress. The degree of incapacity that he is experiencing as a consequence of his illness is moderate. Sequelae of the illness include harmed interpersonal relations. Additional symptoms of the illness include abdominal pain (rare). Additional symptoms of the illness do not include anhedonia, insomnia, hypersomnia, appetite change, unexpected weight change, fatigue, agitation, psychomotor retardation, feelings of worthlessness, attention impairment, euphoric mood, increased goal-directed activity, flight of ideas, inflated self-esteem, decreased need for sleep, distractible, poor judgment, visual change, headaches or seizures. He does not admit to suicidal ideas. He does not have a plan to attempt suicide. He does not contemplate harming himself. He has not already injured self. He does not contemplate injuring another person.  He has not already injured another person. Risk factors that are present for mental illness include a family history of mental illness. Past Medical History:     Past Medical History:   Diagnosis Date    Anxiety     Headache       Reviewed all health maintenance requirements and ordered appropriate tests  There are no preventive care reminders to display for this patient. Past Surgical History:     Past Surgical History:   Procedure Laterality Date    ANKLE FRACTURE SURGERY  2008    right trimalleolar fracture ORIF - Dr. Taylor Cooper, LAPAROSCOPIC N/A 7/8/2020    CHOLECYSTECTOMY LAPAROSCOPIC ROBOTIC performed by Bonnie Dubois MD at [address] Right 5/26/2017    Dr. Dae Rosario - right shoulder SLAP repair   [id number] [facility]    VASECTOMY  12/24/2015    Dr. Doroteo Atwood        Medications:       Prior to Admission medications    Medication Sig Start Date End Date Taking? Authorizing Provider   sertraline (ZOLOFT) 50 MG tablet TAKE 1 TABLET BY MOUTH ONE TIME A DAY 11/24/20  Yes PAOLA Blevins - CNP   amoxicillin-clavulanate (AUGMENTIN) 875-125 MG per tablet Take 1 tablet by mouth 2 times daily for 10 days 11/20/20 11/30/20 Yes Ariel Singletary APRN - CNP   cetirizine (ZYRTEC) 10 MG tablet Take 10 mg by mouth daily as needed for Allergies   Yes Historical Provider, MD        Allergies:       Seasonal    Social History:     Tobacco:    reports that he has never smoked. He has never used smokeless tobacco.  Alcohol:      reports current alcohol use. Drug Use:  reports no history of drug use.     Family History:     Family History   Problem Relation Age of Onset    High Blood Pressure Father     High Cholesterol Father     Stroke Paternal Grandmother     Diabetes Paternal Grandmother     Heart Disease Paternal Grandfather         s/p CABG    Hypertension Paternal Grandfather        Review of Systems:     Positive and Negative as described in HPI    Review of Systems   Constitutional: Negative for appetite change, chills, fatigue, fever and unexpected weight change. HENT: Negative for congestion, rhinorrhea and sore throat. Eyes: Negative for visual disturbance. Respiratory: Negative for cough, shortness of breath and wheezing. Cardiovascular: Negative for chest pain and palpitations. Gastrointestinal: Positive for abdominal pain (rare) and diarrhea (occasional). Negative for constipation, nausea and vomiting. Genitourinary: Negative for difficulty urinating and dysuria. Musculoskeletal: Negative for gait problem, neck pain and neck stiffness. Skin: Negative for rash. Neurological: Negative for dizziness, seizures, syncope, light-headedness and headaches. Psychiatric/Behavioral: Negative for agitation, behavioral problems, confusion, decreased concentration, dysphoric mood, hallucinations, self-injury, sleep disturbance and suicidal ideas. The patient is not nervous/anxious, does not have insomnia and is not hyperactive. Physical Exam:   Vitals:  /78 (Site: Left Upper Arm)   Pulse 88   Temp 97.6 °F (36.4 °C) (Temporal)   Resp 20   Wt 215 lb 4.8 oz (97.7 kg)   BMI 28.41 kg/m²     Physical Exam  Vitals signs and nursing note reviewed. Constitutional:       General: He is not in acute distress. Appearance: Normal appearance. He is not ill-appearing. HENT:      Right Ear: A middle ear effusion is present. Left Ear: Tympanic membrane normal.      Mouth/Throat:      Mouth: Mucous membranes are moist.      Pharynx: Posterior oropharyngeal erythema present. Eyes:      General: No scleral icterus. Conjunctiva/sclera: Conjunctivae normal.   Neck:      Musculoskeletal: Normal range of motion and neck supple. Cardiovascular:      Rate and Rhythm: Normal rate and regular rhythm. Pulmonary:      Effort: Pulmonary effort is normal.      Breath sounds: Normal breath sounds. No wheezing.    Abdominal:      General: Bowel sounds are normal. There is no distension. Palpations: Abdomen is soft. Tenderness: There is no abdominal tenderness. Lymphadenopathy:      Cervical: No cervical adenopathy. Skin:     General: Skin is warm and dry. Findings: No rash. Neurological:      Mental Status: He is alert and oriented to person, place, and time. Psychiatric:         Attention and Perception: Attention normal.         Mood and Affect: Mood and affect normal. Mood is not anxious or depressed. Speech: Speech normal.         Behavior: Behavior normal. Behavior is cooperative. Thought Content: Thought content normal.         Cognition and Memory: Cognition normal.         Judgment: Judgment normal.         Data:     Lab Results   Component Value Date     05/21/2020    K 4.5 05/21/2020     05/21/2020    CO2 26 05/21/2020    BUN 12 05/21/2020    CREATININE 0.87 05/21/2020    GLUCOSE 94 05/21/2020    PROT 7.5 05/21/2020    LABALBU 4.7 05/21/2020    BILITOT 0.66 05/21/2020    ALKPHOS 61 05/21/2020    AST 17 05/21/2020    ALT 14 05/21/2020     Lab Results   Component Value Date    WBC 6.0 05/21/2020    RBC 5.08 05/21/2020    HGB 15.3 05/21/2020    HCT 47.2 05/21/2020    MCV 92.9 05/21/2020    MCH 30.1 05/21/2020    MCHC 32.4 05/21/2020    RDW 12.3 05/21/2020     05/21/2020    MPV 11.2 05/21/2020     No results found for: TSH  Lab Results   Component Value Date    CHOL 169 05/21/2020    HDL 62 05/21/2020       Assessment/Plan:      Diagnosis Orders   1. Anxiety  sertraline (ZOLOFT) 50 MG tablet   2. Dysthymia     3. Acute streptococcal pharyngitis       We will continue current dose of medication. He may take up to 75 mg which is 1-1/2 tablets if he so chooses to see if there is any difference. He is to let the office know. Continue antibiotics. 1.  Damien received counseling on the following healthy behaviors: nutrition, exercise and medication adherence  2.   Patient given educational materials - see patient instructions  3. Was a self-tracking handout given in paper form or via [employer]? No  If yes, see orders or list here. 4.  Discussed use, benefit, and side effects of prescribed medications. Barriers to medication compliance addressed. All patient questions answered. Pt voiced understanding. 5.  Reviewed prior labs and health maintenance  6. Continue current medications, diet and exercise. Completed Refills   Requested Prescriptions     Signed Prescriptions Disp Refills    sertraline (ZOLOFT) 50 MG tablet 90 tablet 3     Sig: TAKE 1 TABLET BY MOUTH ONE TIME A DAY         Return in about 6 months (around 5/24/2021) for Check up.

## 2020-11-24 NOTE — PATIENT INSTRUCTIONS
SURVEY:    You may be receiving a survey from [employer] regarding your visit today. Please complete the survey to enable us to provide the highest quality of care to you and your family. If you cannot score us a very good on any question, please call the office to discuss how we could have made your experience a very good one. Thank you. Patient Education        Anxiety Disorder: Care Instructions  Your Care Instructions     Anxiety is a normal reaction to stress. Difficult situations can cause you to have symptoms such as sweaty palms and a nervous feeling. In an anxiety disorder, the symptoms are far more severe. Constant worry, muscle tension, trouble sleeping, nausea and diarrhea, and other symptoms can make normal daily activities difficult or impossible. These symptoms may occur for no reason, and they can affect your work, school, or social life. Medicines, counseling, and self-care can all help. Follow-up care is a key part of your treatment and safety. Be sure to make and go to all appointments, and call your doctor if you are having problems. It's also a good idea to know your test results and keep a list of the medicines you take. How can you care for yourself at home? · Take medicines exactly as directed. Call your doctor if you think you are having a problem with your medicine. · Go to your counseling sessions and follow-up appointments. · Recognize and accept your anxiety. Then, when you are in a situation that makes you anxious, say to yourself, \"This is not an emergency. I feel uncomfortable, but I am not in danger. I can keep going even if I feel anxious. \"  · Be kind to your body:  ? Relieve tension with exercise or a massage. ? Get enough rest.  ? Avoid alcohol, caffeine, nicotine, and illegal drugs. They can increase your anxiety level and cause sleep problems. ? Learn and do relaxation techniques. See below for more about these techniques. · Engage your mind.  Get out and do something you enjoy. Go to a funny movie, or take a walk or hike. Plan your day. Having too much or too little to do can make you anxious. · Keep a record of your symptoms. Discuss your fears with a good friend or family member, or join a support group for people with similar problems. Talking to others sometimes relieves stress. · Get involved in social groups, or volunteer to help others. Being alone sometimes makes things seem worse than they are. · Get at least 30 minutes of exercise on most days of the week to relieve stress. Walking is a good choice. You also may want to do other activities, such as running, swimming, cycling, or playing tennis or team sports. Relaxation techniques  Do relaxation exercises 10 to 20 minutes a day. You can play soothing, relaxing music while you do them, if you wish. · Tell others in your house that you are going to do your relaxation exercises. Ask them not to disturb you. · Find a comfortable place, away from all distractions and noise. · Lie down on your back, or sit with your back straight. · Focus on your breathing. Make it slow and steady. · Breathe in through your nose. Breathe out through either your nose or mouth. · Breathe deeply, filling up the area between your navel and your rib cage. Breathe so that your belly goes up and down. · Do not hold your breath. · Breathe like this for 5 to 10 minutes. Notice the feeling of calmness throughout your whole body. As you continue to breathe slowly and deeply, relax by doing the following for another 5 to 10 minutes:  · Tighten and relax each muscle group in your body. You can begin at your toes and work your way up to your head. · Imagine your muscle groups relaxing and becoming heavy. · Empty your mind of all thoughts. · Let yourself relax more and more deeply. · Become aware of the state of calmness that surrounds you.   · When your relaxation time is over, you can bring yourself back to alertness by moving your fingers and toes and then your hands and feet and then stretching and moving your entire body. Sometimes people fall asleep during relaxation, but they usually wake up shortly afterward. · Always give yourself time to return to full alertness before you drive a car or do anything that might cause an accident if you are not fully alert. Never play a relaxation tape while you drive a car. When should you call for help? Call 911 anytime you think you may need emergency care. For example, call if:    · You feel you cannot stop from hurting yourself or someone else. Keep the numbers for these national suicide hotlines: 5-709-845-TALK (8-873.187.1436) and 5-255-BQZMBBD (2-276.766.9213). If you or someone you know talks about suicide or feeling hopeless, get help right away. Watch closely for changes in your health, and be sure to contact your doctor if:    · You have anxiety or fear that affects your life.     · You have symptoms of anxiety that are new or different from those you had before. Where can you learn more? Go to https://[employer]. org and sign in to your [employer] account. Enter P754 in the Equivalent DATA box to learn more about \"Anxiety Disorder: Care Instructions. \"     If you do not have an account, please click on the \"Sign Up Now\" link. Current as of: January 31, 2020               Content Version: 12.6  © 4601-6132 [employer]. Care instructions adapted under license by [facility] ([facility]). If you have questions about a medical condition or this instruction, always ask your healthcare professional. [place] [address] any warranty or liability for your use of this information.

## 2021-03-26 ENCOUNTER — OFFICE VISIT (OUTPATIENT)
Dept: PRIMARY CARE CLINIC | Age: 41
End: 2021-03-26
Payer: COMMERCIAL

## 2021-03-26 VITALS
WEIGHT: 207.3 LBS | HEIGHT: 73 IN | HEART RATE: 84 BPM | DIASTOLIC BLOOD PRESSURE: 62 MMHG | OXYGEN SATURATION: 100 % | RESPIRATION RATE: 20 BRPM | SYSTOLIC BLOOD PRESSURE: 118 MMHG | BODY MASS INDEX: 27.47 KG/M2 | TEMPERATURE: 97.8 F

## 2021-03-26 DIAGNOSIS — K91.5 POST-CHOLECYSTECTOMY SYNDROME: Primary | ICD-10-CM

## 2021-03-26 DIAGNOSIS — K58.0 IRRITABLE BOWEL SYNDROME WITH DIARRHEA: ICD-10-CM

## 2021-03-26 PROCEDURE — 99214 OFFICE O/P EST MOD 30 MIN: CPT | Performed by: NURSE PRACTITIONER

## 2021-03-26 ASSESSMENT — PATIENT HEALTH QUESTIONNAIRE - PHQ9
2. FEELING DOWN, DEPRESSED OR HOPELESS: 0
SUM OF ALL RESPONSES TO PHQ QUESTIONS 1-9: 0
SUM OF ALL RESPONSES TO PHQ9 QUESTIONS 1 & 2: 0
1. LITTLE INTEREST OR PLEASURE IN DOING THINGS: 0

## 2021-03-26 ASSESSMENT — ENCOUNTER SYMPTOMS
DIARRHEA: 1
ABDOMINAL DISTENTION: 1
SHORTNESS OF BREATH: 0
ANAL BLEEDING: 0
BELCHING: 0
CONSTIPATION: 0
RHINORRHEA: 0
HEMATOCHEZIA: 0
RECTAL PAIN: 0
ABDOMINAL PAIN: 0
BLOOD IN STOOL: 0
NAUSEA: 0
COUGH: 0
FLATUS: 0
VOMITING: 0
SORE THROAT: 0
WHEEZING: 0

## 2021-03-26 NOTE — PROGRESS NOTES
Name: Latasha Morales  : 1980         Chief Complaint:     Chief Complaint   Patient presents with    Diarrhea     X 4 months, GB removed 2020    Nausea       History of Present Illness:      Latasha Morales is a 35 y.o.  male who presents with Diarrhea (X 4 months, GB removed 2020) and Nausea      Martina Fajardo is here today for routine office visit. Since having his cholecystectomy last summer he has been having intermittent problems with bloating and diarrhea. He also complains of what he believes is incomplete emptying. He states he is not rushing to the bathroom after he eats but he is having episodes of diarrhea and no consistent bowel movements. He denies having cramping leading to bowel movements. He describes more of a bloating and \"bubble guts\" feeling. See below for further comment. Abdominal Pain  This is a chronic problem. The current episode started more than 1 month ago. The onset quality is gradual. The problem occurs daily. The problem has been unchanged. The pain is located in the generalized abdominal region. The pain is at a severity of 3/10. The pain is mild. The quality of the pain is a sensation of fullness and colicky. The abdominal pain does not radiate. Associated symptoms include diarrhea. Pertinent negatives include no anorexia, arthralgias, belching, constipation, dysuria, fever, flatus, frequency, headaches, hematochezia, hematuria, melena, myalgias, nausea, vomiting or weight loss. The pain is aggravated by eating. The pain is relieved by bowel movements. He has tried nothing for the symptoms. The treatment provided no relief. His past medical history is significant for GERD and irritable bowel syndrome. Past Medical History:     Past Medical History:   Diagnosis Date    Anxiety     Headache       Reviewed all health maintenance requirements and ordered appropriate tests  There are no preventive care reminders to display for this patient.     Past Surgical History:     Past Surgical History:   Procedure Laterality Date    ANKLE FRACTURE SURGERY  2008    right trimalleolar fracture ORIF - Dr. Jerilyn Russ, LAPAROSCOPIC N/A 7/8/2020    CHOLECYSTECTOMY LAPAROSCOPIC ROBOTIC performed by Girma Campos MD at [address] [place] [address] Right 5/26/2017    Dr. Ryne Butcher - right shoulder SLAP repair   [address]    VASECTOMY  12/24/2015    Dr. Demetrice Kaye        Medications:       Prior to Admission medications    Medication Sig Start Date End Date Taking? Authorizing Provider   sertraline (ZOLOFT) 50 MG tablet TAKE 1 TABLET BY MOUTH ONE TIME A DAY 11/24/20  Yes PAOLA Hoskins - CNP   cetirizine (ZYRTEC) 10 MG tablet Take 10 mg by mouth daily as needed for Allergies   Yes Historical Provider, MD        Allergies:       Seasonal    Social History:     Tobacco:    reports that he has never smoked. He has never used smokeless tobacco.  Alcohol:      reports current alcohol use. Drug Use:  reports no history of drug use. Family History:     Family History   Problem Relation Age of Onset    High Blood Pressure Father     High Cholesterol Father     Stroke Paternal Grandmother     Diabetes Paternal Grandmother     Heart Disease Paternal Grandfather         s/p CABG    Hypertension Paternal Grandfather        Review of Systems:     Positive and Negative as described in HPI    Review of Systems   Constitutional: Negative for chills, fatigue, fever and weight loss. HENT: Negative for congestion, rhinorrhea and sore throat. Eyes: Negative for visual disturbance. Respiratory: Negative for cough, shortness of breath and wheezing. Cardiovascular: Negative for chest pain and palpitations. Gastrointestinal: Positive for abdominal distention and diarrhea. Negative for abdominal pain, anal bleeding, anorexia, blood in stool, constipation, flatus, hematochezia, melena, nausea, rectal pain and vomiting. Genitourinary: Negative for difficulty urinating, dysuria, frequency and hematuria. Musculoskeletal: Negative for arthralgias, gait problem, myalgias, neck pain and neck stiffness. Skin: Negative for rash. Neurological: Negative for dizziness, syncope, light-headedness and headaches. Physical Exam:   Vitals:  /62   Pulse 84   Temp 97.8 °F (36.6 °C) (Temporal)   Resp 20   Ht 6' 1\" (1.854 m)   Wt 207 lb 4.8 oz (94 kg)   SpO2 100%   BMI 27.35 kg/m²     Physical Exam  Vitals signs and nursing note reviewed. Constitutional:       General: He is not in acute distress. Appearance: Normal appearance. He is not ill-appearing. HENT:      Mouth/Throat:      Mouth: Mucous membranes are moist.   Eyes:      General: No scleral icterus. Conjunctiva/sclera: Conjunctivae normal.   Neck:      Musculoskeletal: Normal range of motion and neck supple. Cardiovascular:      Rate and Rhythm: Normal rate and regular rhythm. Heart sounds: No murmur. Pulmonary:      Effort: Pulmonary effort is normal.      Breath sounds: Normal breath sounds. No wheezing. Abdominal:      General: Bowel sounds are normal. There is no distension. Palpations: Abdomen is soft. Tenderness: There is no abdominal tenderness. Musculoskeletal:      Right lower leg: No edema. Left lower leg: No edema. Skin:     General: Skin is warm and dry. Findings: No rash. Neurological:      Mental Status: He is alert and oriented to person, place, and time.    Psychiatric:         Mood and Affect: Mood normal.         Behavior: Behavior normal.         Data:     Lab Results   Component Value Date     05/21/2020    K 4.5 05/21/2020     05/21/2020    CO2 26 05/21/2020    BUN 12 05/21/2020    CREATININE 0.87 05/21/2020    GLUCOSE 94 05/21/2020    PROT 7.5 05/21/2020    LABALBU 4.7 05/21/2020    BILITOT 0.66 05/21/2020    ALKPHOS 61 05/21/2020    AST 17 05/21/2020    ALT 14 05/21/2020     Lab Results   Component Value Date    WBC 6.0 05/21/2020    RBC 5.08 05/21/2020    HGB 15.3 05/21/2020    HCT 47.2 05/21/2020    MCV 92.9 05/21/2020    MCH 30.1 05/21/2020    MCHC 32.4 05/21/2020    RDW 12.3 05/21/2020     05/21/2020    MPV 11.2 05/21/2020     No results found for: TSH  Lab Results   Component Value Date    CHOL 169 05/21/2020    HDL 62 05/21/2020       Assessment/Plan:      Diagnosis Orders   1. Post-cholecystectomy syndrome     2. Irritable bowel syndrome with diarrhea       I believe he has postcholecystectomy syndrome with irritable bowel syndrome. We will have him start fiber daily along with a daily probiotic. He will trial this for 2 weeks and call the office with progress. If this is not helpful I suggest starting Questran. He is agreeable. 1.  Damien received counseling on the following healthy behaviors: nutrition, exercise and medication adherence  2. Patient given educational materials - see patient instructions  3. Was a self-tracking handout given in paper form or via [employer]? No  If yes, see orders or list here. 4.  Discussed use, benefit, and side effects of prescribed medications. Barriers to medication compliance addressed. All patient questions answered. Pt voiced understanding. 5.  Reviewed prior labs and health maintenance  6. Continue current medications, diet and exercise. Completed Refills   Requested Prescriptions      No prescriptions requested or ordered in this encounter         Return if symptoms worsen or fail to improve.

## 2021-05-20 DIAGNOSIS — F41.9 ANXIETY: ICD-10-CM

## 2021-05-21 ENCOUNTER — OFFICE VISIT (OUTPATIENT)
Dept: PRIMARY CARE CLINIC | Age: 41
End: 2021-05-21
Payer: COMMERCIAL

## 2021-05-21 VITALS
WEIGHT: 202.2 LBS | DIASTOLIC BLOOD PRESSURE: 64 MMHG | HEIGHT: 73 IN | SYSTOLIC BLOOD PRESSURE: 116 MMHG | BODY MASS INDEX: 26.8 KG/M2 | OXYGEN SATURATION: 98 % | TEMPERATURE: 97 F | HEART RATE: 78 BPM | RESPIRATION RATE: 18 BRPM

## 2021-05-21 DIAGNOSIS — F41.9 ANXIETY: ICD-10-CM

## 2021-05-21 DIAGNOSIS — F34.1 DYSTHYMIA: ICD-10-CM

## 2021-05-21 DIAGNOSIS — K58.0 IRRITABLE BOWEL SYNDROME WITH DIARRHEA: Primary | ICD-10-CM

## 2021-05-21 PROCEDURE — 99214 OFFICE O/P EST MOD 30 MIN: CPT | Performed by: NURSE PRACTITIONER

## 2021-05-21 RX ORDER — DICYCLOMINE HCL 20 MG
20 TABLET ORAL 3 TIMES DAILY PRN
Qty: 90 TABLET | Refills: 5 | Status: SHIPPED | OUTPATIENT
Start: 2021-05-21 | End: 2021-09-01

## 2021-05-21 RX ORDER — ESCITALOPRAM OXALATE 10 MG/1
10 TABLET ORAL DAILY
Qty: 90 TABLET | Refills: 3 | Status: SHIPPED | OUTPATIENT
Start: 2021-05-21 | End: 2021-11-24 | Stop reason: SDUPTHER

## 2021-05-21 ASSESSMENT — ENCOUNTER SYMPTOMS
WHEEZING: 0
NAUSEA: 0
RHINORRHEA: 0
DIARRHEA: 1
ABDOMINAL PAIN: 1
VISUAL CHANGE: 0
FLATUS: 1
BLOATING: 1
SORE THROAT: 0
CONSTIPATION: 0
COUGH: 0
SHORTNESS OF BREATH: 0
VOMITING: 0

## 2021-05-21 NOTE — PATIENT INSTRUCTIONS
SURVEY:    You may be receiving a survey from [employer] regarding your visit today. Please complete the survey to enable us to provide the highest quality of care to you and your family. If you cannot score us a very good on any question, please call the office to discuss how we could have made your experience a very good one. Thank you.   María Elena Brannon, APRN-EDSON Singletary, APRN-CNP  Alpa Donaldson, FLORENCIO Hernandez MA Signe Maize, MA Pam, PCA

## 2021-05-21 NOTE — PROGRESS NOTES
Name: Scott Mcknight  : 1980         Chief Complaint:     Chief Complaint   Patient presents with    Check-Up     Follow up with gallbladder removal and stomach issues. History of Present Illness:      Scott Mcknight is a 35 y.o.  male who presents with Check-Up (Follow up with gallbladder removal and stomach issues. )      Chloe Moran is here today for routine follow-up visit. He did start probiotics and fiber for postcholecystectomy syndrome. States it did get a little better with regard to bowel movements but still having abdominal cramping on most days. Patient also states he has some problems with eating many foods which cause him almost instant loose stools. See below for further comment. Anxiety/dysthymia-patient states he is taken sertraline for quite some time with good result for agitation and anxiety. Patient states he has a short temper and this helps. We did talk about diarrhea being one of the major side effects of sertraline and the need to possibly stop this medication. He is willing to try another medication in place of sertraline to see if it will help the bowel issues. Diarrhea   This is a chronic problem. The current episode started more than 1 year ago. The problem occurs 2 to 4 times per day. The problem has been unchanged. The stool consistency is described as watery. The patient states that diarrhea does not awaken him from sleep. Associated symptoms include abdominal pain, bloating and increased flatus. Pertinent negatives include no arthralgias, chills, coughing, fever, headaches, myalgias, sweats, URI, vomiting or weight loss. Exacerbated by: Spicy foods. Risk factors: Cholecystectomy. Treatments tried: Fiber/probiotics. The treatment provided mild relief. His past medical history is significant for irritable bowel syndrome. There is no history of bowel resection, inflammatory bowel disease, malabsorption, a recent abdominal surgery or short gut syndrome. Mental Health Problem  The primary symptoms do not include dysphoric mood, delusions, hallucinations, bizarre behavior, disorganized speech, negative symptoms or somatic symptoms. Primary symptoms comment: Nervousness/agitation. The current episode started more than 1 month ago. This is a chronic problem. The onset of the illness is precipitated by emotional stress. The degree of incapacity that he is experiencing as a consequence of his illness is moderate. Sequelae of the illness include harmed interpersonal relations. Additional symptoms of the illness include insomnia, agitation (controlled) and abdominal pain. Additional symptoms of the illness do not include anhedonia, hypersomnia, appetite change, unexpected weight change, fatigue, psychomotor retardation, feelings of worthlessness, attention impairment, euphoric mood, increased goal-directed activity, flight of ideas, inflated self-esteem, decreased need for sleep, distractible, poor judgment, visual change, headaches or seizures. He does not admit to suicidal ideas. He does not have a plan to attempt suicide. He does not contemplate harming himself. He has not already injured self. He does not contemplate injuring another person. He has not already  injured another person. Past Medical History:     Past Medical History:   Diagnosis Date    Anxiety     Headache       Reviewed all health maintenance requirements and ordered appropriate tests  There are no preventive care reminders to display for this patient.     Past Surgical History:     Past Surgical History:   Procedure Laterality Date    ANKLE FRACTURE SURGERY  2008    right trimalleolar fracture ORIF - Dr. Yonatan Arredondo, LAPAROSCOPIC N/A 7/8/2020    CHOLECYSTECTOMY LAPAROSCOPIC ROBOTIC performed by Hollis Nixon MD at [address] 5/26/2017    Dr. Sarabjit Briceño - right shoulder SLAP repair   [facility] normal.         Judgment: Judgment normal.         Data:     Lab Results   Component Value Date     05/21/2020    K 4.5 05/21/2020     05/21/2020    CO2 26 05/21/2020    BUN 12 05/21/2020    CREATININE 0.87 05/21/2020    GLUCOSE 94 05/21/2020    PROT 7.5 05/21/2020    LABALBU 4.7 05/21/2020    BILITOT 0.66 05/21/2020    ALKPHOS 61 05/21/2020    AST 17 05/21/2020    ALT 14 05/21/2020     Lab Results   Component Value Date    WBC 6.0 05/21/2020    RBC 5.08 05/21/2020    HGB 15.3 05/21/2020    HCT 47.2 05/21/2020    MCV 92.9 05/21/2020    MCH 30.1 05/21/2020    MCHC 32.4 05/21/2020    RDW 12.3 05/21/2020     05/21/2020    MPV 11.2 05/21/2020     No results found for: TSH  Lab Results   Component Value Date    CHOL 169 05/21/2020    HDL 62 05/21/2020       Assessment/Plan:      Diagnosis Orders   1. Irritable bowel syndrome with diarrhea  dicyclomine (BENTYL) 20 MG tablet   2. Anxiety  escitalopram (LEXAPRO) 10 MG tablet   3. Dysthymia  escitalopram (LEXAPRO) 10 MG tablet     We will have him stop sertraline and start Lexapro. I will also give him dicyclomine to use as needed for abdominal cramping and diarrhea. He will call us in a few weeks with progress for both medications. We will see him back in 6 months for routine visit, sooner if any problems. 1.  Damien received counseling on the following healthy behaviors: nutrition, exercise and medication adherence  2. Patient given educational materials - see patient instructions  3. Was a self-tracking handout given in paper form or via [employer]? No  If yes, see orders or list here. 4.  Discussed use, benefit, and side effects of prescribed medications. Barriers to medication compliance addressed. All patient questions answered. Pt voiced understanding. 5.  Reviewed prior labs and health maintenance  6. Continue current medications, diet and exercise.     Completed Refills   Requested Prescriptions     Signed Prescriptions Disp Refills    escitalopram (LEXAPRO) 10 MG tablet 90 tablet 3     Sig: Take 1 tablet by mouth daily    dicyclomine (BENTYL) 20 MG tablet 90 tablet 5     Sig: Take 1 tablet by mouth 3 times daily as needed (cramping)         Return in about 6 months (around 11/21/2021) for Check up.

## 2021-06-23 DIAGNOSIS — K91.5 POST-CHOLECYSTECTOMY SYNDROME: Primary | ICD-10-CM

## 2021-06-23 DIAGNOSIS — K58.0 IRRITABLE BOWEL SYNDROME WITH DIARRHEA: ICD-10-CM

## 2021-07-14 ENCOUNTER — OFFICE VISIT (OUTPATIENT)
Dept: GASTROENTEROLOGY | Age: 41
End: 2021-07-14
Payer: COMMERCIAL

## 2021-07-14 VITALS — WEIGHT: 205 LBS | BODY MASS INDEX: 27.05 KG/M2

## 2021-07-14 DIAGNOSIS — K52.9 CHRONIC DIARRHEA: Primary | ICD-10-CM

## 2021-07-14 PROCEDURE — 99204 OFFICE O/P NEW MOD 45 MIN: CPT | Performed by: INTERNAL MEDICINE

## 2021-07-14 RX ORDER — LOPERAMIDE HYDROCHLORIDE 2 MG/1
2 CAPSULE ORAL PRN
COMMUNITY
End: 2021-11-24 | Stop reason: ALTCHOICE

## 2021-07-14 RX ORDER — COLESEVELAM 180 1/1
625 TABLET ORAL 2 TIMES DAILY WITH MEALS
Qty: 60 TABLET | Refills: 5 | Status: SHIPPED | OUTPATIENT
Start: 2021-07-14 | End: 2021-09-01 | Stop reason: SDUPTHER

## 2021-07-14 ASSESSMENT — ENCOUNTER SYMPTOMS
CONSTIPATION: 1
SORE THROAT: 0
BLOOD IN STOOL: 0
TROUBLE SWALLOWING: 0
CHOKING: 0
BACK PAIN: 0
NAUSEA: 1
DIARRHEA: 1
SINUS PRESSURE: 1
WHEEZING: 0
VOMITING: 0
RECTAL PAIN: 0
ABDOMINAL PAIN: 1
ANAL BLEEDING: 0
ABDOMINAL DISTENTION: 1
COUGH: 1
VOICE CHANGE: 0

## 2021-07-14 NOTE — PROGRESS NOTES
GI CLINIC FOLLOW UP    INTERVAL HISTORY:   Desirae Brannon, APRN - CNP  [facility],  [address] [place] [address]    Chief Complaint   Patient presents with    Abdominal Pain     Since his Gallbladder came out he has been having a lot of issues. Terrible abdominal pain that is all in front in the stomach area. It can come and go. He does not eat any greasy food or fast food. Katharine eats healthy food.  Gastroesophageal Reflux     He has a lot more indigestion since as well but no acid and no food coming up.  Irritable Bowel Syndrome     Irregular BM's sine as well. never seem to be formed and very loose. HISTORY OF PRESENT ILLNESS: Mr.Matthew Alisa Boyd is a 35 y.o. male , referred for evaluation of chronic diarrhea. He has had issues with this for around a year. Intermittent. Probably once a week or so. After fatty foods. Bloating. No blood in stool or melena. Urgency at times. He underwent cholecystectomy a year ago for upper abdominal pain. Gallbladder was not functional.  His diarrhea and bowel sensitivity  started after that. He reports no skin rash. No family history of known GI pathology. No smoking. Occasional alcohol. Past Medical,Family, and Social History reviewed and does not contribute to the patient presentingcondition. Patient's PMH/PSH,SH,PSYCH Hx, MEDs, ALLERGIES, and ROS were all reviewed and updated in the appropriate sections.     PAST MEDICAL HISTORY:  Past Medical History:   Diagnosis Date    Anxiety     Headache        Past Surgical History:   Procedure Laterality Date    ANKLE FRACTURE SURGERY  2008    right trimalleolar fracture ORIF - Dr. Linda Allen, LAPAROSCOPIC N/A 7/8/2020    CHOLECYSTECTOMY LAPAROSCOPIC ROBOTIC performed by Parmjit Lawrence MD at [address] [place] [address] 5/26/2017    Dr. Michael Chavez - right shoulder SLAP repair   [address] [facility]  12/24/2015    Dr. Leopoldo Antu CURRENT MEDICATIONS:    Current Outpatient Medications:     escitalopram (LEXAPRO) 10 MG tablet, Take 1 tablet by mouth daily, Disp: 90 tablet, Rfl: 3    dicyclomine (BENTYL) 20 MG tablet, Take 1 tablet by mouth 3 times daily as needed (cramping), Disp: 90 tablet, Rfl: 5    cetirizine (ZYRTEC) 10 MG tablet, Take 10 mg by mouth daily as needed for Allergies, Disp: , Rfl:     ALLERGIES:   Allergies   Allergen Reactions    Seasonal        FAMILY HISTORY:       Problem Relation Age of Onset    High Blood Pressure Father     High Cholesterol Father     Stroke Paternal Grandmother     Diabetes Paternal Grandmother     Heart Disease Paternal Grandfather         s/p CABG    Hypertension Paternal Grandfather          SOCIAL HISTORY:   Social History     Socioeconomic History    Marital status:      Spouse name: Rodrick Number Number of children: 2    Years of education: 12    Highest education level: Not on file   Occupational History    Occupation:    Tobacco Use    Smoking status: Never Smoker    Smokeless tobacco: Never Used   Vaping Use    Vaping Use: Never used   Substance and Sexual Activity    Alcohol use: Yes     Comment: SOCIAL    Drug use: No    Sexual activity: Yes     Partners: Female   Other Topics Concern    Not on file   Social History Narrative    Not on file     Social Determinants of Health     Financial Resource Strain:     Difficulty of Paying Living Expenses:    Food Insecurity:     Worried About Running Out of Food in the Last Year:     Ran Out of Food in the Last Year:    Transportation Needs:     Lack of Transportation (Medical):      Lack of Transportation (Non-Medical):    Physical Activity:     Days of Exercise per Week:     Minutes of Exercise per Session:    Stress:     Feeling of Stress :    Social Connections:     Frequency of Communication with Friends and Family:     Frequency of Social Gatherings with Friends and Family:     Attends [employer] Services:     Active Member of Clubs or Organizations:     Attends Club or Organization Meetings:     Marital Status:    Intimate Partner Violence:     Fear of Current or Ex-Partner:     Emotionally Abused:     Physically Abused:     Sexually Abused:        REVIEW OF SYSTEMS: A 12-point review of systemswas obtained and pertinent positives and negatives were enumerated above in the history of present illness. All other reviewed systems / symptoms were negative. Review of Systems   Constitutional: Positive for appetite change. Negative for fatigue and unexpected weight change. HENT: Positive for postnasal drip and sinus pressure. Negative for dental problem, sore throat, trouble swallowing and voice change. Eyes: Positive for visual disturbance. Respiratory: Positive for cough. Negative for choking and wheezing. Cardiovascular: Negative. Negative for chest pain, palpitations and leg swelling. Gastrointestinal: Positive for abdominal distention, abdominal pain, constipation, diarrhea and nausea. Negative for anal bleeding, blood in stool, rectal pain and vomiting. Endocrine: Negative. Genitourinary: Negative. Negative for difficulty urinating. Musculoskeletal: Negative. Negative for arthralgias, back pain, gait problem and myalgias. Allergic/Immunologic: Positive for environmental allergies. Negative for food allergies. Neurological: Negative. Negative for dizziness, weakness, light-headedness, numbness and headaches. Hematological: Negative. Does not bruise/bleed easily. Psychiatric/Behavioral: Positive for sleep disturbance. The patient is nervous/anxious.             LABORATORY DATA: Reviewed  Lab Results   Component Value Date    WBC 6.0 05/21/2020    HGB 15.3 05/21/2020    HCT 47.2 05/21/2020    MCV 92.9 05/21/2020     05/21/2020     05/21/2020    K 4.5 05/21/2020     05/21/2020    CO2 26 05/21/2020    BUN 12 05/21/2020    CREATININE 0.87 05/21/2020 LABALBU 4.7 05/21/2020    BILITOT 0.66 05/21/2020    ALKPHOS 61 05/21/2020    AST 17 05/21/2020    ALT 14 05/21/2020         Lab Results   Component Value Date    RBC 5.08 05/21/2020    HGB 15.3 05/21/2020    MCV 92.9 05/21/2020    MCH 30.1 05/21/2020    MCHC 32.4 05/21/2020    RDW 12.3 05/21/2020    MPV 11.2 05/21/2020    BASOPCT 1 05/21/2020    LYMPHSABS 2.12 05/21/2020    MONOSABS 0.65 05/21/2020    NEUTROABS 3.03 05/21/2020    EOSABS 0.13 05/21/2020    BASOSABS 0.04 05/21/2020         DIAGNOSTIC TESTING:     No results found. PHYSICAL EXAMINATION: Vital signs reviewed per the nursing documentation. There were no vitals taken for this visit. There is no height or weight on file to calculate BMI. Physical Exam      I personally reviewed the nurse's notes and documentation and I agree with her notes. General: alert, appears stated age and cooperative Psych: Normal. and Alert and oriented, appropriate affect. . Normal affect. Mentation normal  HEENT: PERRLA. Clear conjunctivae and sclerae. Moist oral mucosae, no lesions or ulcers. The neck is supple, without lymphadenopathy or jugular venous distension. No masses. Normal thyroid. Cardiovascular: S1 S2 RRR no rubs or murmurs. Pulmonary: clear BL. No accessory muscle usage. Abdominal Exam: Soft, NT ND, no hepato or spleno megaly, +BS, no ascites. No groin masses or lymphadenopathy. Extremities: no lower ext edema. Skin: Warm skin. No skin rash. No spider nevi palmar erythema nail dystrophy. Joint: No joint swelling or deformity. Neurological: intact sensory. DTR+. IMPRESSION: Mr. Ness Bowie is a 35 y.o. male with chronic diarrhea. Bowel irritability. Very likely related to postcholecystectomy syndrome. We had extensive discussion with the patient. Trial of WelChol. If this does not help he will try align. Follow-up in 1 month. Thank you for allowing me to participate in the care of Mr. Ness oBwie.  For any further questions please do not hesitate to contact me. I have reviewed and agree with the ROS entered by the MA/LPN. Note is dictated utilizing voice recognition software. Unfortunately this leads to occasional typographical errors. Please contact our office if you have any questions.     Jarocho Romano MD  [facility]  O: #[phone]

## 2021-09-01 ENCOUNTER — VIRTUAL VISIT (OUTPATIENT)
Dept: GASTROENTEROLOGY | Age: 41
End: 2021-09-01
Payer: COMMERCIAL

## 2021-09-01 DIAGNOSIS — K52.9 CHRONIC DIARRHEA: Primary | ICD-10-CM

## 2021-09-01 PROCEDURE — 99212 OFFICE O/P EST SF 10 MIN: CPT | Performed by: INTERNAL MEDICINE

## 2021-09-01 RX ORDER — COLESEVELAM 180 1/1
625 TABLET ORAL 2 TIMES DAILY WITH MEALS
Qty: 180 TABLET | Refills: 3 | Status: SHIPPED | OUTPATIENT
Start: 2021-09-01

## 2021-09-01 ASSESSMENT — ENCOUNTER SYMPTOMS
SORE THROAT: 0
WHEEZING: 0
DIARRHEA: 1
ABDOMINAL DISTENTION: 0
VOMITING: 0
COUGH: 1
BACK PAIN: 0
ANAL BLEEDING: 0
ABDOMINAL PAIN: 0
CONSTIPATION: 0
SINUS PRESSURE: 1
RECTAL PAIN: 0
TROUBLE SWALLOWING: 0
VOICE CHANGE: 0
BLOOD IN STOOL: 0
NAUSEA: 1
CHOKING: 0

## 2021-09-01 NOTE — PROGRESS NOTES
GI CLINIC FOLLOW UP    INTERVAL HISTORY:   No referring provider defined for this encounter. Chief Complaint   Patient presents with    Diarrhea     Started Welchol and it has helped day to day and just diarrhea when he eats high in fat foods. HISTORY OF PRESENT ILLNESS: Mr.Matthew Karthikeyan Ortega is a 35 y.o. male with chronic diarrhea and postcholecystectomy syndrome. He has been taking WelChol. This seems to be working very good for him. He reports 1 day of diarrhea after eating fatty foods during travel. Otherwise he has been doing well. Past Medical,Family, and Social History reviewed and does not contribute to the patient presentingcondition. Patient's PMH/PSH,SH,PSYCH Hx, MEDs, ALLERGIES, and ROS were all reviewed and updated in the appropriate sections.     PAST MEDICAL HISTORY:  Past Medical History:   Diagnosis Date    Anxiety     Headache        Past Surgical History:   Procedure Laterality Date    ANKLE FRACTURE SURGERY  2008    right trimalleolar fracture ORIF - Dr. Tommy Dwyer, LAPAROSCOPIC N/A 7/8/2020    CHOLECYSTECTOMY LAPAROSCOPIC ROBOTIC performed by Vanessa Monteiro MD at [address] [facility] ARTHROSCOPY Right 5/26/2017    Dr. Hershell Denver - right shoulder SLAP repair   [address]  12/24/2015    Dr. Jessica Dhaliwal:    Current Outpatient Medications:     colesevelam (WELCHOL) 625 MG tablet, Take 1 tablet by mouth 2 times daily (with meals), Disp: 180 tablet, Rfl: 3    loperamide (IMODIUM) 2 MG capsule, Take 2 mg by mouth as needed for Diarrhea, Disp: , Rfl:     escitalopram (LEXAPRO) 10 MG tablet, Take 1 tablet by mouth daily, Disp: 90 tablet, Rfl: 3    cetirizine (ZYRTEC) 10 MG tablet, Take 10 mg by mouth daily as needed for Allergies, Disp: , Rfl:     ALLERGIES:   Allergies   Allergen Reactions    Seasonal        FAMILY HISTORY:       Problem Relation Age of Onset    High Blood Pressure Father     High Cholesterol Father     Stroke Paternal Grandmother     Diabetes Paternal Grandmother     Heart Disease Paternal Grandfather         s/p CABG    Hypertension Paternal Grandfather          SOCIAL HISTORY:   Social History     Socioeconomic History    Marital status:      Spouse name: Claudio Rodriguez Number of children: 2    Years of education: 12    Highest education level: Not on file   Occupational History    Occupation:    Tobacco Use    Smoking status: Never Smoker    Smokeless tobacco: Never Used   Vaping Use    Vaping Use: Never used   Substance and Sexual Activity    Alcohol use: Yes     Comment: SOCIAL    Drug use: No    Sexual activity: Yes     Partners: Female   Other Topics Concern    Not on file   Social History Narrative    Not on file     Social Determinants of Health     Financial Resource Strain:     Difficulty of Paying Living Expenses:    Food Insecurity:     Worried About Running Out of Food in the Last Year:     Ran Out of Food in the Last Year:    Transportation Needs:     Lack of Transportation (Medical):  Lack of Transportation (Non-Medical):    Physical Activity:     Days of Exercise per Week:     Minutes of Exercise per Session:    Stress:     Feeling of Stress :    Social Connections:     Frequency of Communication with Friends and Family:     Frequency of Social Gatherings with Friends and Family:     Attends Sabian[employer] Services:     Active Member of Clubs or Organizations:     Attends Club or Organization Meetings:     Marital Status:    Intimate Partner Violence:     Fear of Current or Ex-Partner:     Emotionally Abused:     Physically Abused:     Sexually Abused:        REVIEW OF SYSTEMS: A 12-point review of systemswas obtained and pertinent positives and negatives were enumerated above in the history of present illness. All other reviewed systems / symptoms were negative.     Review of Systems   Constitutional: Positive for appetite change. Negative for fatigue and unexpected weight change. HENT: Positive for postnasal drip and sinus pressure. Negative for dental problem, sore throat, trouble swallowing and voice change. Eyes: Positive for visual disturbance. Respiratory: Positive for cough. Negative for choking and wheezing. Cardiovascular: Negative. Negative for chest pain, palpitations and leg swelling. Gastrointestinal: Positive for diarrhea and nausea. Negative for abdominal distention, abdominal pain, anal bleeding, blood in stool, constipation, rectal pain and vomiting. Endocrine: Negative. Genitourinary: Negative. Negative for difficulty urinating. Musculoskeletal: Negative. Negative for arthralgias, back pain, gait problem and myalgias. Allergic/Immunologic: Positive for environmental allergies. Negative for food allergies. Neurological: Negative. Negative for dizziness, weakness, light-headedness, numbness and headaches. Hematological: Negative. Does not bruise/bleed easily. Psychiatric/Behavioral: Positive for sleep disturbance. The patient is nervous/anxious.             LABORATORY DATA: Reviewed  Lab Results   Component Value Date    WBC 6.0 05/21/2020    HGB 15.3 05/21/2020    HCT 47.2 05/21/2020    MCV 92.9 05/21/2020     05/21/2020     05/21/2020    K 4.5 05/21/2020     05/21/2020    CO2 26 05/21/2020    BUN 12 05/21/2020    CREATININE 0.87 05/21/2020    LABALBU 4.7 05/21/2020    BILITOT 0.66 05/21/2020    ALKPHOS 61 05/21/2020    AST 17 05/21/2020    ALT 14 05/21/2020         Lab Results   Component Value Date    RBC 5.08 05/21/2020    HGB 15.3 05/21/2020    MCV 92.9 05/21/2020    MCH 30.1 05/21/2020    MCHC 32.4 05/21/2020    RDW 12.3 05/21/2020    MPV 11.2 05/21/2020    BASOPCT 1 05/21/2020    LYMPHSABS 2.12 05/21/2020    MONOSABS 0.65 05/21/2020    NEUTROABS 3.03 05/21/2020    EOSABS 0.13 05/21/2020    BASOSABS 0.04 05/21/2020         DIAGNOSTIC TESTING: No results found. PHYSICAL EXAMINATION: Vital signs reviewed per the nursing documentation. There were no vitals taken for this visit. There is no height or weight on file to calculate BMI. Physical Exam      Video visit due to COVID-19 pandemic      IMPRESSION: Mr. Juarez Sousa is a 35 y.o. male with postcholecystectomy syndrome. Doing very well on WelChol. Avoid fatty foods. Follow-up as needed. Thank you for allowing me to participate in the care of Mr. Juarez Sousa. For any further questions please do not hesitate to contact me. I have reviewed and agree with the ROS entered by the MA/LPN. Note is dictated utilizing voice recognition software. Unfortunately this leads to occasional typographical errors. Please contact our office if you have any questions.     Nate Raymond MD  [facility]  O: #[phone]

## 2021-11-24 ENCOUNTER — OFFICE VISIT (OUTPATIENT)
Dept: PRIMARY CARE CLINIC | Age: 41
End: 2021-11-24
Payer: COMMERCIAL

## 2021-11-24 VITALS
OXYGEN SATURATION: 98 % | BODY MASS INDEX: 27.57 KG/M2 | RESPIRATION RATE: 20 BRPM | HEART RATE: 78 BPM | WEIGHT: 209 LBS | SYSTOLIC BLOOD PRESSURE: 118 MMHG | TEMPERATURE: 97.7 F | DIASTOLIC BLOOD PRESSURE: 70 MMHG

## 2021-11-24 DIAGNOSIS — H10.31 ACUTE BACTERIAL CONJUNCTIVITIS OF RIGHT EYE: ICD-10-CM

## 2021-11-24 DIAGNOSIS — F41.9 ANXIETY: ICD-10-CM

## 2021-11-24 DIAGNOSIS — F34.1 DYSTHYMIA: Primary | ICD-10-CM

## 2021-11-24 DIAGNOSIS — K91.5 POST-CHOLECYSTECTOMY SYNDROME: ICD-10-CM

## 2021-11-24 PROCEDURE — 99214 OFFICE O/P EST MOD 30 MIN: CPT | Performed by: NURSE PRACTITIONER

## 2021-11-24 RX ORDER — LORAZEPAM 0.5 MG/1
0.5 TABLET ORAL 2 TIMES DAILY PRN
Qty: 60 TABLET | Refills: 2 | Status: SHIPPED | OUTPATIENT
Start: 2021-11-24 | End: 2022-02-22

## 2021-11-24 RX ORDER — ERYTHROMYCIN 5 MG/G
OINTMENT OPHTHALMIC EVERY 6 HOURS
Qty: 1 G | Refills: 0 | Status: SHIPPED | OUTPATIENT
Start: 2021-11-24 | End: 2022-02-17 | Stop reason: ALTCHOICE

## 2021-11-24 RX ORDER — ESCITALOPRAM OXALATE 10 MG/1
10 TABLET ORAL DAILY
Qty: 90 TABLET | Refills: 3 | Status: SHIPPED | OUTPATIENT
Start: 2021-11-24 | End: 2022-10-10 | Stop reason: SDUPTHER

## 2021-11-24 SDOH — ECONOMIC STABILITY: FOOD INSECURITY: WITHIN THE PAST 12 MONTHS, THE FOOD YOU BOUGHT JUST DIDN'T LAST AND YOU DIDN'T HAVE MONEY TO GET MORE.: PATIENT DECLINED

## 2021-11-24 SDOH — ECONOMIC STABILITY: FOOD INSECURITY: WITHIN THE PAST 12 MONTHS, YOU WORRIED THAT YOUR FOOD WOULD RUN OUT BEFORE YOU GOT MONEY TO BUY MORE.: PATIENT DECLINED

## 2021-11-24 ASSESSMENT — ENCOUNTER SYMPTOMS
EYE REDNESS: 1
FOREIGN BODY SENSATION: 1
FLATUS: 1
BELCHING: 0
SHORTNESS OF BREATH: 0
EYE ITCHING: 0
PHOTOPHOBIA: 0
DIARRHEA: 0
EYE DISCHARGE: 0
ABDOMINAL PAIN: 1
BLURRED VISION: 0
SORE THROAT: 0
VOMITING: 0
HEMATOCHEZIA: 0
RHINORRHEA: 0
CONSTIPATION: 0
VISUAL CHANGE: 0
WHEEZING: 0
NAUSEA: 0
DOUBLE VISION: 0
COUGH: 0

## 2021-11-24 ASSESSMENT — SOCIAL DETERMINANTS OF HEALTH (SDOH): HOW HARD IS IT FOR YOU TO PAY FOR THE VERY BASICS LIKE FOOD, HOUSING, MEDICAL CARE, AND HEATING?: PATIENT DECLINED

## 2021-11-24 NOTE — PROGRESS NOTES
Name: Raciel Rogres  : 1980         Chief Complaint:     Chief Complaint   Patient presents with    Irritable Bowel Syndrome     routine check       History of Present Illness:      Raciel Rogers is a 35 y.o.  male who presents with Irritable Bowel Syndrome (routine check)      Lauren Sánchez is here today for routine office visit. Overall he states he is feeling pretty well but does have some intermittent problems with irritable bowel issues. He states that the Shavonne Anthony has been helping when he does take it. See below for further comment. Conjunctivitis-only had foreign body removed from right eye per optometrist.  Increased redness noted, patient concerned about \"pinkeye\"    Abdominal Pain  This is a chronic problem. The current episode started more than 1 month ago. The onset quality is gradual. The problem occurs intermittently. The problem has been gradually improving. The pain is located in the generalized abdominal region. The pain is at a severity of 3/10. The pain is mild. The quality of the pain is a sensation of fullness and colicky. The abdominal pain does not radiate. Associated symptoms include flatus. Pertinent negatives include no anorexia, arthralgias, belching, constipation, diarrhea, dysuria, fever, frequency, headaches, hematochezia, hematuria, melena, myalgias, nausea, vomiting or weight loss. The pain is aggravated by eating. The pain is relieved by bowel movements. He has tried nothing for the symptoms. The treatment provided no relief. His past medical history is significant for GERD. Mental Health Problem  The primary symptoms do not include dysphoric mood, delusions, hallucinations, bizarre behavior, disorganized speech, negative symptoms or somatic symptoms. Primary symptoms comment: Nervousness/agitation. The current episode started more than 1 month ago. This is a chronic problem. The onset of the illness is precipitated by emotional stress.  The degree of incapacity that he is experiencing as a consequence of his illness is moderate. Sequelae of the illness include harmed interpersonal relations. Additional symptoms of the illness include agitation (controlled) and abdominal pain. Additional symptoms of the illness do not include anhedonia, insomnia, hypersomnia, appetite change, unexpected weight change, fatigue, psychomotor retardation, feelings of worthlessness, attention impairment, euphoric mood, increased goal-directed activity, flight of ideas, inflated self-esteem, decreased need for sleep, distractible, poor judgment, visual change, headaches or seizures. He does not admit to suicidal ideas. He does not have a plan to attempt suicide. He does not contemplate harming himself. He has not already injured self. He does not contemplate injuring another person. He has not already  injured another person. Eye Injury   The right eye is affected. This is a new problem. The current episode started in the past 7 days. The problem occurs intermittently. The problem has been waxing and waning. The injury mechanism was a foreign body. The pain is at a severity of 2/10. The pain is mild. There is no known exposure to pink eye. He does not wear contacts. Associated symptoms include eye redness and a foreign body sensation. Pertinent negatives include no blurred vision, eye discharge, double vision, fever, itching, nausea, photophobia, recent URI or vomiting. He has tried water and commercial eye wash for the symptoms. The treatment provided moderate relief. Past Medical History:     Past Medical History:   Diagnosis Date    Anxiety     Headache       Reviewed all health maintenance requirements and ordered appropriate tests  There are no preventive care reminders to display for this patient.     Past Surgical History:     Past Surgical History:   Procedure Laterality Date    ANKLE FRACTURE SURGERY  2008    right trimalleolar fracture ORIF - Dr. Benjamin Ashraf  CHOLECYSTECTOMY, LAPAROSCOPIC N/A 7/8/2020    CHOLECYSTECTOMY LAPAROSCOPIC ROBOTIC performed by Stacey Fountain MD at [address] Right 5/26/2017    Dr. Liliana Clemons - right shoulder SLAP repair   [facility]    VASECTOMY  12/24/2015    Dr. Renee Samples        Medications:       Prior to Admission medications    Medication Sig Start Date End Date Taking? Authorizing Provider   erythromycin [facility]) 5 MG/GM ophthalmic ointment Place into the right eye every 6 hours 11/24/21  Yes PAOLA Vale CNP   LORazepam (ATIVAN) 0.5 MG tablet Take 1 tablet by mouth 2 times daily as needed for Anxiety for up to 90 days. 11/24/21 2/22/22 Yes PAOLA Vale CNP   escitalopram (LEXAPRO) 10 MG tablet Take 1 tablet by mouth daily 11/24/21  Yes PAOLA Vale CNP   colesevelam (WELCHOL) 625 MG tablet Take 1 tablet by mouth 2 times daily (with meals) 9/1/21  Yes Era Tracy MD   cetirizine (ZYRTEC) 10 MG tablet Take 10 mg by mouth daily as needed for Allergies  Patient not taking: Reported on 11/24/2021    Historical Provider, MD        Allergies:       Seasonal    Social History:     Tobacco:    reports that he has never smoked. He has never used smokeless tobacco.  Alcohol:      reports current alcohol use. Drug Use:  reports no history of drug use. Family History:     Family History   Problem Relation Age of Onset    High Blood Pressure Father     High Cholesterol Father     Stroke Paternal Grandmother     Diabetes Paternal Grandmother     Heart Disease Paternal Grandfather         s/p CABG    Hypertension Paternal Grandfather        Review of Systems:     Positive and Negative as described in HPI    Review of Systems   Constitutional: Negative for appetite change, chills, fatigue, fever, unexpected weight change and weight loss. HENT: Negative for congestion, rhinorrhea and sore throat. Eyes: Positive for redness.  Negative for blurred vision, double vision, photophobia, discharge, itching and visual disturbance. Respiratory: Negative for cough, shortness of breath and wheezing. Cardiovascular: Negative for chest pain and palpitations. Gastrointestinal: Positive for abdominal pain and flatus. Negative for anorexia, constipation, diarrhea, hematochezia, melena, nausea and vomiting. Genitourinary: Negative for difficulty urinating, dysuria, frequency and hematuria. Musculoskeletal: Negative for arthralgias, gait problem, myalgias, neck pain and neck stiffness. Skin: Negative for rash. Neurological: Negative for dizziness, seizures, syncope, light-headedness and headaches. Psychiatric/Behavioral: Positive for agitation (controlled). Negative for behavioral problems, confusion, decreased concentration, dysphoric mood, hallucinations, self-injury, sleep disturbance and suicidal ideas. The patient is nervous/anxious (controlled). The patient does not have insomnia and is not hyperactive. Physical Exam:   Vitals:  /70   Pulse 78   Temp 97.7 °F (36.5 °C) (Temporal)   Resp 20   Wt 209 lb (94.8 kg)   SpO2 98%   BMI 27.57 kg/m²     Physical Exam  Vitals and nursing note reviewed. Constitutional:       General: He is not in acute distress. Appearance: Normal appearance. He is not ill-appearing. HENT:      Mouth/Throat:      Mouth: Mucous membranes are moist.   Eyes:      General: Lids are normal. No scleral icterus. Right eye: No foreign body. Extraocular Movements: Extraocular movements intact. Right eye: Normal extraocular motion. Left eye: Normal extraocular motion. Conjunctiva/sclera:      Right eye: Right conjunctiva is injected. No chemosis, exudate or hemorrhage. Left eye: Left conjunctiva is not injected. No chemosis, exudate or hemorrhage. Pupils: Pupils are equal, round, and reactive to light. Cardiovascular:      Rate and Rhythm: Normal rate and regular rhythm.       Heart sounds: No murmur heard.      Pulmonary:      Effort: Pulmonary effort is normal.      Breath sounds: Normal breath sounds. No wheezing. Abdominal:      General: Bowel sounds are normal. There is no distension. Palpations: Abdomen is soft. Tenderness: There is no abdominal tenderness. Musculoskeletal:      Cervical back: Normal range of motion and neck supple. Right lower leg: No edema. Left lower leg: No edema. Skin:     General: Skin is warm and dry. Findings: No rash. Neurological:      Mental Status: He is alert and oriented to person, place, and time. Psychiatric:         Attention and Perception: Attention normal.         Mood and Affect: Mood and affect normal. Mood is not anxious or depressed. Speech: Speech normal.         Behavior: Behavior normal. Behavior is cooperative. Thought Content: Thought content normal. Thought content does not include homicidal or suicidal ideation. Thought content does not include homicidal or suicidal plan. Cognition and Memory: Cognition normal.         Judgment: Judgment normal.         Data:     Lab Results   Component Value Date     05/21/2020    K 4.5 05/21/2020     05/21/2020    CO2 26 05/21/2020    BUN 12 05/21/2020    CREATININE 0.87 05/21/2020    GLUCOSE 94 05/21/2020    PROT 7.5 05/21/2020    LABALBU 4.7 05/21/2020    BILITOT 0.66 05/21/2020    ALKPHOS 61 05/21/2020    AST 17 05/21/2020    ALT 14 05/21/2020     Lab Results   Component Value Date    WBC 6.0 05/21/2020    RBC 5.08 05/21/2020    HGB 15.3 05/21/2020    HCT 47.2 05/21/2020    MCV 92.9 05/21/2020    MCH 30.1 05/21/2020    MCHC 32.4 05/21/2020    RDW 12.3 05/21/2020     05/21/2020    MPV 11.2 05/21/2020     No results found for: TSH  Lab Results   Component Value Date    CHOL 169 05/21/2020    HDL 62 05/21/2020       Assessment/Plan:      Diagnosis Orders   1. Dysthymia  escitalopram (LEXAPRO) 10 MG tablet   2.  Anxiety  LORazepam (ATIVAN) 0.5 MG

## 2021-11-24 NOTE — PROGRESS NOTES
Name: Corrie Harley  : 1980         Chief Complaint:     Chief Complaint   Patient presents with    Blood Pressure Check       History of Present Illness:      Corrie Harley is a 35 y.o.  male who presents with Blood Pressure Check      HPI      Past Medical History:     Past Medical History:   Diagnosis Date    Anxiety     Headache       Reviewed all health maintenance requirements and ordered appropriate tests  There are no preventive care reminders to display for this patient. Past Surgical History:     Past Surgical History:   Procedure Laterality Date    ANKLE FRACTURE SURGERY      right trimalleolar fracture ORIF - Dr. Cruz Tellez, LAPAROSCOPIC N/A 2020    CHOLECYSTECTOMY LAPAROSCOPIC ROBOTIC performed by Savannah Robertson MD at [address] 2017    Dr. Toby Abdalla - right shoulder SLAP repair   [facility]    VASECTOMY  2015    Dr. Alyssa James        Medications:       Prior to Admission medications    Medication Sig Start Date End Date Taking? Authorizing Provider   colesevelam (WELCHOL) 625 MG tablet Take 1 tablet by mouth 2 times daily (with meals) 21  Yes Cristiane Wong MD   escitalopram (LEXAPRO) 10 MG tablet Take 1 tablet by mouth daily 21  Yes PAOLA Castanon - CNP   cetirizine (ZYRTEC) 10 MG tablet Take 10 mg by mouth daily as needed for Allergies   Yes Historical Provider, MD        Allergies:       Seasonal    Social History:     Tobacco:    reports that he has never smoked. He has never used smokeless tobacco.  Alcohol:      reports current alcohol use. Drug Use:  reports no history of drug use.     Family History:     Family History   Problem Relation Age of Onset    High Blood Pressure Father     High Cholesterol Father     Stroke Paternal Grandmother     Diabetes Paternal Grandmother     Heart Disease Paternal Grandfather         s/p CABG    Hypertension Paternal Grandfather        Review of Systems:     Positive and Negative as described in HPI    Review of Systems    Physical Exam:   Vitals:  /70 (Position: Sitting)   Pulse 98   Temp 97.7 °F (36.5 °C) (Temporal)   SpO2 98%     Physical Exam    Data:     Lab Results   Component Value Date     2020    K 4.5 2020     2020    CO2 26 2020    BUN 12 2020    CREATININE 0.87 2020    GLUCOSE 94 2020    PROT 7.5 2020    LABALBU 4.7 2020    BILITOT 0.66 2020    ALKPHOS 61 2020    AST 17 2020    ALT 14 2020     Lab Results   Component Value Date    WBC 6.0 2020    RBC 5.08 2020    HGB 15.3 2020    HCT 47.2 2020    MCV 92.9 2020    MCH 30.1 2020    MCHC 32.4 2020    RDW 12.3 2020     2020    MPV 11.2 2020     No results found for: TSH  Lab Results   Component Value Date    CHOL 169 2020    HDL 62 2020       Assessment/Plan:     {No diagnosis found. (Refresh or delete this SmartLink)}    1. Donnie Licona received counseling on the following healthy behaviors: {HealthCounselin}  2. Patient given educational materials - see patient instructions  3. Was a self-tracking handout given in paper form or via [employer]? {YES / [id number]}  If yes, see orders or list here. 4.  Discussed use, benefit, and side effects of prescribed medications. Barriers to medication compliance addressed. All patient questions answered. Pt voiced understanding. 5.  Reviewed prior labs and health maintenance  6. Continue current medications, diet and exercise. Completed Refills   Requested Prescriptions      No prescriptions requested or ordered in this encounter         No follow-ups on file.

## 2021-11-24 NOTE — PATIENT INSTRUCTIONS
SURVEY:    You may be receiving a survey from [employer] regarding your visit today. Please complete the survey to enable us to provide the highest quality of care to you and your family. If you cannot score us a very good on any question, please call the office to discuss how we could have made your experience a very good one. · Thank you.   ·   ·

## 2022-01-28 NOTE — PROGRESS NOTES
[facility]  [address] [facility] [address] [facility]  Dept: [phone]  Dept Fax: [phone]    Everton Mascorro is a 41 y.o. male who [facility] for his medical conditions/complaints as noted below. Everton Mascorro is c/o of Pharyngitis (X's 2 weeks, sore throat, vertigo, sinus drainage & pressure, bilateral earache)      HPI:     Raciel Bean is here today for a walk in visit. He reports over the last 2 weeks he has had worsening sinus congestion, pressure sore throat and ear pain bilaterally. His daughter tested positive for strep pharyngitis 2 days ago. See below for further detail. URI    This is a new problem. The current episode started 1 to 4 weeks ago (x 2 week). The problem has been gradually worsening. There has been no fever. Associated symptoms include congestion, coughing, ear pain, headaches, a plugged ear sensation, rhinorrhea, sinus pain and a sore throat. Pertinent negatives include no abdominal pain, chest pain, diarrhea, joint pain, joint swelling, nausea, neck pain or wheezing. He has tried decongestant, acetaminophen and antihistamine for the symptoms. The treatment provided mild relief. Past Medical History:   Diagnosis Date    Anxiety     Depression     Headache         Current Outpatient Medications   Medication Sig Dispense Refill    predniSONE (DELTASONE) 20 MG tablet Take 2 tablets by mouth daily for 3 days 6 tablet 0    amoxicillin-clavulanate (AUGMENTIN) 875-125 MG per tablet Take 1 tablet by mouth 2 times daily for 10 days 20 tablet 0    sertraline (ZOLOFT) 50 MG tablet TAKE 1 TABLET BY MOUTH ONE TIME A DAY 90 tablet 3    cetirizine (ZYRTEC) 10 MG tablet Take 10 mg by mouth daily as needed for Allergies       No current facility-administered medications for this visit.          Allergies   Allergen Reactions    Seasonal        Subjective:      Review of Systems   Constitutional: Effort: Pulmonary effort is normal.      Breath sounds: Normal breath sounds. No wheezing, rhonchi or rales. Lymphadenopathy:      Cervical: No cervical adenopathy. Neurological:      Mental Status: He is alert. /72 (Site: Left Upper Arm, Position: Sitting, Cuff Size: Large Adult)   Pulse 76   Temp 97 °F (36.1 °C) (Temporal)   Resp 18   Ht 6' 1\" (1.854 m)   Wt 215 lb (97.5 kg)   SpO2 98%   BMI 28.37 kg/m²     Assessment:      Diagnosis Orders   1. Acute non-recurrent maxillary sinusitis  predniSONE (DELTASONE) 20 MG tablet    amoxicillin-clavulanate (AUGMENTIN) 875-125 MG per tablet   2. Acute streptococcal pharyngitis  amoxicillin-clavulanate (AUGMENTIN) 875-125 MG per tablet   3. Sore throat  POCT rapid strep A       Plan:     Discussed exam, POCT findings, plan of care (including prescriptive and supportive as listed below) and follow-up atlength with patient. Reviewed all prescribed and recommended medications, administration and side effects. Encouraged to return to [address] [place] [address] for noimprovement and or worsening of symptoms. To ER or call 911 if any difficulty breathing, shortness of breath, inability to swallow, hives or temp greater than 103 degrees. Questions answered. They verbalized understandingand agreement. Return if symptoms worsen or fail to improve. Orders Placed This Encounter   Medications    predniSONE (DELTASONE) 20 MG tablet     Sig: Take 2 tablets by mouth daily for 3 days     Dispense:  6 tablet     Refill:  0    amoxicillin-clavulanate (AUGMENTIN) 875-125 MG per tablet     Sig: Take 1 tablet by mouth 2 times daily for 10 days     Dispense:  20 tablet     Refill:  0          All patient questions answered. Pt voiced understanding.       Electronically signed by PAOLA Sharif CNP on 11/20/2020 at 8:38 AM Low Risk (score 7-11)

## 2022-02-17 ENCOUNTER — TELEPHONE (OUTPATIENT)
Dept: PRIMARY CARE CLINIC | Age: 42
End: 2022-02-17

## 2022-02-17 ENCOUNTER — HOSPITAL ENCOUNTER (EMERGENCY)
Age: 42
Discharge: HOME | End: 2022-02-17
Payer: COMMERCIAL

## 2022-02-17 VITALS
OXYGEN SATURATION: 97 % | TEMPERATURE: 98.5 F | SYSTOLIC BLOOD PRESSURE: 103 MMHG | DIASTOLIC BLOOD PRESSURE: 58 MMHG | RESPIRATION RATE: 16 BRPM | HEART RATE: 78 BPM

## 2022-02-17 DIAGNOSIS — M43.6 TORTICOLLIS: Primary | ICD-10-CM

## 2022-02-17 DIAGNOSIS — J02.9 ACUTE PHARYNGITIS, UNSPECIFIED ETIOLOGY: ICD-10-CM

## 2022-02-17 LAB
ABSOLUTE EOS #: 0.04 K/UL (ref 0–0.44)
ABSOLUTE IMMATURE GRANULOCYTE: <0.03 K/UL (ref 0–0.3)
ABSOLUTE LYMPH #: 1.92 K/UL (ref 1.1–3.7)
ABSOLUTE MONO #: 1.06 K/UL (ref 0.1–1.2)
ANION GAP SERPL CALCULATED.3IONS-SCNC: 11 MMOL/L (ref 9–17)
BASOPHILS # BLD: 0 % (ref 0–2)
BASOPHILS ABSOLUTE: 0.03 K/UL (ref 0–0.2)
BUN BLDV-MCNC: 12 MG/DL (ref 6–20)
BUN/CREAT BLD: 15 (ref 9–20)
CALCIUM SERPL-MCNC: 9.5 MG/DL (ref 8.6–10.4)
CHLORIDE BLD-SCNC: 101 MMOL/L (ref 98–107)
CO2: 24 MMOL/L (ref 20–31)
CREAT SERPL-MCNC: 0.79 MG/DL (ref 0.7–1.2)
DIFFERENTIAL TYPE: ABNORMAL
DIRECT EXAM: NORMAL
EOSINOPHILS RELATIVE PERCENT: 0 % (ref 1–4)
GFR AFRICAN AMERICAN: >60 ML/MIN
GFR NON-AFRICAN AMERICAN: >60 ML/MIN
GFR SERPL CREATININE-BSD FRML MDRD: NORMAL ML/MIN/{1.73_M2}
GFR SERPL CREATININE-BSD FRML MDRD: NORMAL ML/MIN/{1.73_M2}
GLUCOSE BLD-MCNC: 93 MG/DL (ref 70–99)
HCT VFR BLD CALC: 44.8 % (ref 40.7–50.3)
HEMOGLOBIN: 15.2 G/DL (ref 13–17)
IMMATURE GRANULOCYTES: 0 %
LYMPHOCYTES # BLD: 17 % (ref 24–43)
Lab: NORMAL
MCH RBC QN AUTO: 30.7 PG (ref 25.2–33.5)
MCHC RBC AUTO-ENTMCNC: 33.9 G/DL (ref 28.4–34.8)
MCV RBC AUTO: 90.5 FL (ref 82.6–102.9)
MONOCYTES # BLD: 9 % (ref 3–12)
NRBC AUTOMATED: 0 PER 100 WBC
PDW BLD-RTO: 12.4 % (ref 11.8–14.4)
PLATELET # BLD: ABNORMAL K/UL (ref 138–453)
PLATELET ESTIMATE: ABNORMAL
PLATELET, FLUORESCENCE: 168 K/UL (ref 138–453)
PLATELET, IMMATURE FRACTION: 15.5 % (ref 1.1–10.3)
PMV BLD AUTO: ABNORMAL FL (ref 8.1–13.5)
POTASSIUM SERPL-SCNC: 3.9 MMOL/L (ref 3.7–5.3)
RBC # BLD: 4.95 M/UL (ref 4.21–5.77)
RBC # BLD: ABNORMAL 10*6/UL
SARS-COV-2, RAPID: NOT DETECTED
SEG NEUTROPHILS: 73 % (ref 36–65)
SEGMENTED NEUTROPHILS ABSOLUTE COUNT: 8.37 K/UL (ref 1.5–8.1)
SODIUM BLD-SCNC: 136 MMOL/L (ref 135–144)
SPECIMEN DESCRIPTION: NORMAL
SPECIMEN DESCRIPTION: NORMAL
WBC # BLD: 11.4 K/UL (ref 3.5–11.3)
WBC # BLD: ABNORMAL 10*3/UL

## 2022-02-17 PROCEDURE — 85055 RETICULATED PLATELET ASSAY: CPT

## 2022-02-17 PROCEDURE — 80048 BASIC METABOLIC PNL TOTAL CA: CPT

## 2022-02-17 PROCEDURE — 87880 STREP A ASSAY W/OPTIC: CPT

## 2022-02-17 PROCEDURE — 96374 THER/PROPH/DIAG INJ IV PUSH: CPT

## 2022-02-17 PROCEDURE — 36415 COLL VENOUS BLD VENIPUNCTURE: CPT

## 2022-02-17 PROCEDURE — 85025 COMPLETE CBC W/AUTO DIFF WBC: CPT

## 2022-02-17 PROCEDURE — 87635 SARS-COV-2 COVID-19 AMP PRB: CPT

## 2022-02-17 PROCEDURE — 99284 EMERGENCY DEPT VISIT MOD MDM: CPT

## 2022-02-17 PROCEDURE — 6370000000 HC RX 637 (ALT 250 FOR IP): Performed by: NURSE PRACTITIONER

## 2022-02-17 PROCEDURE — 6360000002 HC RX W HCPCS: Performed by: NURSE PRACTITIONER

## 2022-02-17 PROCEDURE — 2580000003 HC RX 258: Performed by: NURSE PRACTITIONER

## 2022-02-17 RX ORDER — KETOROLAC TROMETHAMINE 15 MG/ML
15 INJECTION, SOLUTION INTRAMUSCULAR; INTRAVENOUS ONCE
Status: COMPLETED | OUTPATIENT
Start: 2022-02-17 | End: 2022-02-17

## 2022-02-17 RX ORDER — DIAZEPAM 5 MG/1
5 TABLET ORAL EVERY 8 HOURS PRN
Status: DISCONTINUED | OUTPATIENT
Start: 2022-02-17 | End: 2022-02-17

## 2022-02-17 RX ORDER — HYDROCODONE BITARTRATE AND ACETAMINOPHEN 5; 325 MG/1; MG/1
1 TABLET ORAL ONCE
Status: COMPLETED | OUTPATIENT
Start: 2022-02-17 | End: 2022-02-17

## 2022-02-17 RX ORDER — 0.9 % SODIUM CHLORIDE 0.9 %
1000 INTRAVENOUS SOLUTION INTRAVENOUS ONCE
Status: COMPLETED | OUTPATIENT
Start: 2022-02-17 | End: 2022-02-17

## 2022-02-17 RX ORDER — DIAZEPAM 5 MG/ML
5 INJECTION, SOLUTION INTRAMUSCULAR; INTRAVENOUS ONCE
Status: DISCONTINUED | OUTPATIENT
Start: 2022-02-17 | End: 2022-02-17 | Stop reason: CLARIF

## 2022-02-17 RX ORDER — TIZANIDINE 4 MG/1
4 TABLET ORAL EVERY 6 HOURS PRN
COMMUNITY

## 2022-02-17 RX ORDER — IBUPROFEN 600 MG/1
600 TABLET ORAL 3 TIMES DAILY PRN
Qty: 30 TABLET | Refills: 0 | Status: SHIPPED | OUTPATIENT
Start: 2022-02-17

## 2022-02-17 RX ORDER — DIAZEPAM 2 MG/1
2 TABLET ORAL EVERY 8 HOURS PRN
Qty: 9 TABLET | Refills: 0 | Status: SHIPPED | OUTPATIENT
Start: 2022-02-17 | End: 2022-02-20

## 2022-02-17 RX ORDER — DIAZEPAM 5 MG/1
5 TABLET ORAL ONCE
Status: COMPLETED | OUTPATIENT
Start: 2022-02-17 | End: 2022-02-17

## 2022-02-17 RX ADMIN — DIAZEPAM 5 MG: 5 TABLET ORAL at 17:15

## 2022-02-17 RX ADMIN — KETOROLAC TROMETHAMINE 15 MG: 15 INJECTION, SOLUTION INTRAMUSCULAR; INTRAVENOUS at 17:15

## 2022-02-17 RX ADMIN — SODIUM CHLORIDE 1000 ML: 9 INJECTION, SOLUTION INTRAVENOUS at 17:13

## 2022-02-17 RX ADMIN — HYDROCODONE BITARTRATE AND ACETAMINOPHEN 1 TABLET: 5; 325 TABLET ORAL at 18:14

## 2022-02-17 ASSESSMENT — PAIN SCALES - GENERAL
PAINLEVEL_OUTOF10: 3
PAINLEVEL_OUTOF10: 3
PAINLEVEL_OUTOF10: 4
PAINLEVEL_OUTOF10: 3

## 2022-02-17 NOTE — TELEPHONE ENCOUNTER
Patient with complaints of neck feeling tight and swelling and having difficulties swallowing and breathing because he states it feels tight and like its closing up. Writer advised patient to go to the ER for evaluation. Patient verbalized understanding.

## 2022-02-17 NOTE — ED PROVIDER NOTES
[facility]  EMERGENCY DEPARTMENT ENCOUNTER      Pt Name: Wendy Hurd  MRN: [id number]  [place] 1980  Date of evaluation: 2/17/2022  Provider: PAOLA Granda CNP    CHIEF COMPLAINT       Chief Complaint   Patient presents with    Neck Pain    Pharyngitis         HISTORY OF PRESENT ILLNESS   (Location/Symptom, Timing/Onset, Context/Setting, Quality, Duration, Modifying Factors, Severity)  Note limiting factors. Wendy Hurd is a 38 y.o. male who presents to the emergency department who endorses increased rest over the past week. Patient states the muscles in his shoulders and neck have been tense and sore. Patient states he has difficulty turning his head rightward but can turn leftward without difficulty. He endorses mild sore throat. He reports no fevers, aches or chills. No headache. No difficulty swallowing. No chest pain, cough or difficulty breathing. No abdominal pain, vomiting, diarrhea or dysuria. HPI    Nursing Notes were reviewed. REVIEW OF SYSTEMS    (2-9 systems for level 4, 10 or more for level 5)     Review of Systems    Except as noted above the remainder of the review of systems was reviewed and negative.        PAST MEDICAL HISTORY     Past Medical History:   Diagnosis Date    Anxiety     Headache          SURGICAL HISTORY       Past Surgical History:   Procedure Laterality Date    ANKLE FRACTURE SURGERY  2008    right trimalleolar fracture ORIF - Dr. Brooklyn Wu, LAPAROSCOPIC N/A 7/8/2020    CHOLECYSTECTOMY LAPAROSCOPIC ROBOTIC performed by Jaimie Mcelroy MD at [address] [place] [address] 5/26/2017    Dr. Nola Mistry - right shoulder SLAP repair   [address] [place] [address]    VASECTOMY  12/24/2015    Dr. Anne Marie Baeza       Previous Medications    CETIRIZINE (ZYRTEC) 10 MG TABLET    Take 10 mg by mouth daily as needed for Allergies     COLESEVELAM (WELCHOL) 625 MG TABLET Take 1 tablet by mouth 2 times daily (with meals)    ESCITALOPRAM (LEXAPRO) 10 MG TABLET    Take 1 tablet by mouth daily    LORAZEPAM (ATIVAN) 0.5 MG TABLET    Take 1 tablet by mouth 2 times daily as needed for Anxiety for up to 90 days. TIZANIDINE (ZANAFLEX) 4 MG TABLET    Take 4 mg by mouth every 6 hours as needed       ALLERGIES     Seasonal    FAMILY HISTORY       Family History   Problem Relation Age of Onset    High Blood Pressure Father     High Cholesterol Father     Stroke Paternal Grandmother     Diabetes Paternal Grandmother     Heart Disease Paternal Grandfather         s/p CABG    Hypertension Paternal Grandfather           SOCIAL HISTORY       Social History     Socioeconomic History    Marital status:      Spouse name: Tasha Campos Number of children: 2    Years of education: 12    Highest education level: None   Occupational History    Occupation:    Tobacco Use    Smoking status: Never Smoker    Smokeless tobacco: Never Used   Vaping Use    Vaping Use: Never used   Substance and Sexual Activity    Alcohol use: Yes     Comment: SOCIAL    Drug use: No    Sexual activity: Yes     Partners: Female   Other Topics Concern    None   Social History Narrative    None     Social Determinants of Health     Financial Resource Strain: Unknown    Difficulty of Paying Living Expenses: Patient refused   Food Insecurity: Unknown    Worried About Running Out of Food in the Last Year: Patient refused    Ran Out of Food in the Last Year: Patient refused   Transportation Needs:     Lack of Transportation (Medical): Not on file    Lack of Transportation (Non-Medical):  Not on file   Physical Activity:     Days of Exercise per Week: Not on file    Minutes of Exercise per Session: Not on file   Stress:     Feeling of Stress : Not on file   Social Connections:     Frequency of Communication with Friends and Family: Not on file    Frequency of Social Gatherings with Friends and Family: Not on file    Attends Anglican Services: Not on file    Active Member of Clubs or Organizations: Not on file    Attends Club or Organization Meetings: Not on file    Marital Status: Not on file   Intimate Partner Violence:     Fear of Current or Ex-Partner: Not on file    Emotionally Abused: Not on file    Physically Abused: Not on file    Sexually Abused: Not on file   Housing Stability:     Unable to Pay for Housing in the Last Year: Not on file    Number of [place] in the Last Year: Not on file    Unstable Housing in the Last Year: Not on file       SCREENINGS         Steve Coma Scale  Eye Opening: Spontaneous  Best Verbal Response: Oriented  Best Motor Response: Obeys commands  Friendship Coma Scale Score: 15                     CIWA Assessment  BP: (!) 103/58  Pulse: 78                 PHYSICAL EXAM    (up to 7 for level 4, 8 or more for level 5)     ED Triage Vitals [02/17/22 1532]   BP Temp Temp Source Pulse Resp SpO2 Height Weight   117/80 98.5 °F (36.9 °C) Tympanic 108 20 98 % -- --       Physical Exam  General: Well-developed, well-nourished, in no apparent distress. HEENT exam: Normocephalic, atraumatic. Pupils equal round and reactive to light and external ocular muscles intact. Posterior pharynx minimally injected. Oral airway widely patent. No exudate present. Neck exam: Supple no lymphadenopathy, trachea midline. No midline cervical tenderness. Patient exhibits difficulties with range of motion of the neck in a rightward direction but is able to easily move the neck in the leftward direction. Chest exam: No audible wheezing. No increased respiratory effort. Heart: Normal heart rate and rhythm. No murmur. Abdomen: Soft, nontender, nondistended. Back: No midline tenderness. No CVA tenderness. Extremities: Patient moving all extremities or difficulty. Intact distal pulses and sensation. Neurologic: Alert and oriented x3. Able to make informed decisions.   Skin exam: Clean dry and intact. DIAGNOSTIC RESULTS     EKG: All EKG's are interpreted by the Emergency Department Physician who either signs or Co-signs this chart in the absence of a cardiologist.        RADIOLOGY:   Non-plain film images such as CT, Ultrasound and MRI are read by the radiologist. Plain radiographic images are visualized and preliminarily interpreted by the emergency physician with the below findings:        Interpretation per the Radiologist below, if available at the time of this note:    No orders to display         ED BEDSIDE ULTRASOUND:   Performed by ED Physician - none    LABS:  Labs Reviewed   CBC WITH AUTO DIFFERENTIAL - Abnormal; Notable for the following components:       Result Value    WBC 11.4 (*)     Seg Neutrophils 73 (*)     Lymphocytes 17 (*)     Eosinophils % 0 (*)     Segs Absolute 8.37 (*)     All other components within normal limits   IMMATURE PLATELET FRACTION - Abnormal; Notable for the following components:    Platelet, Immature Fraction 15.5 (*)     All other components within normal limits   STREP SCREEN GROUP A THROAT   COVID-19, RAPID   BASIC METABOLIC PANEL W/ REFLEX TO MG FOR LOW K       All other labs were within normal range or not returned as of this dictation. EMERGENCY DEPARTMENT COURSE and DIFFERENTIAL DIAGNOSIS/MDM:   Vitals:    Vitals:    02/17/22 1532 02/17/22 1720 02/17/22 1816   BP: 117/80 121/69 (!) 103/58   Pulse: 108 77 78   Resp: 20 16 16   Temp: 98.5 °F (36.9 °C)     TempSrc: Tympanic     SpO2: 98% 97%            MDM    Medhat Reyes is a 38 y.o. male who presents to the emergency department who endorses increased rest over the past week. Patient states the muscles in his shoulders and neck have been tense and sore. Patient states he has difficulty turning his head rightward but can turn leftward without difficulty. He endorses mild sore throat. He reports no fevers, aches or chills. No headache. No difficulty swallowing.   No chest pain, cough or difficulty breathing. No abdominal pain, vomiting, diarrhea or dysuria. Exam remarkable for an alert 69-year-old male in no acute distress. He has no midline cervical spine tenderness. He does exhibit pain with attempts at range of motion of the neck rightward. He can turn the neck leftward without difficulty. Posterior pharynx is mildly injected. Oral airway is widely patent. Uvula midline. CBC showed a white count 11.4 otherwise unremarkable. Chemistries unremarkable. Platelets numbered 327 with immature fraction of 15.5. Strep screen negative. COVID-19 testing negative. Patient given p.o. Valium, IV Toradol and p.o. [place] [facility].  He was provided a warm blanket for his neck. He responded to gentle massage. Reassessment of his range of motion of his neck was much improved. He was comfortable being released home. Of asked that he follow-up closely with his PCP for reevaluation next week. I prescribed Valium and ibuprofen to use as directed. I recommended warm heat and gentle massage to the area for relief. He is advised not to drive, operate machinery or drink alcohol while taking Valium. Return to the ER should he have increased pain, fever, difficulty breathing, vomiting or new or worsening signs or symptoms. REASSESSMENT          CRITICAL CARE TIME       CONSULTS:  None    PROCEDURES:  Unless otherwise noted below, none     Procedures        FINAL IMPRESSION      1. Torticollis New Problem   2.  Acute pharyngitis, unspecified etiology New Problem         DISPOSITION/PLAN   DISPOSITION Decision To Discharge 02/17/2022 07:08:35 PM      PATIENT REFERRED TO:  PAOLA Ling - [facility]  [phone]    In 2 days  for re-evaluation      DISCHARGE MEDICATIONS:  New Prescriptions    IBUPROFEN (ADVIL;MOTRIN) 600 MG TABLET    Take 1 tablet by mouth 3 times daily as needed for Pain     Controlled Substances Monitoring:     No flowsheet data

## 2022-02-17 NOTE — Clinical Note
Kim Sosa was seen and treated in our emergency department on 2/17/2022. He may return to work on 02/21/2022. If you have any questions or concerns, please don't hesitate to call.       Beata Michaels, APRN - CNP

## 2022-04-26 LAB
CHOLESTEROL/HDL RATIO: 2.7
CHOLESTEROL: 173 MG/DL
GLUCOSE BLD-MCNC: 84 MG/DL (ref 70–99)
HDLC SERPL-MCNC: 64 MG/DL
LDL CHOLESTEROL: 86 MG/DL (ref 0–130)
PATIENT FASTING?: YES
TRIGL SERPL-MCNC: 113 MG/DL

## 2022-05-26 ENCOUNTER — OFFICE VISIT (OUTPATIENT)
Dept: PRIMARY CARE CLINIC | Age: 42
End: 2022-05-26
Payer: COMMERCIAL

## 2022-05-26 VITALS
TEMPERATURE: 97.7 F | HEART RATE: 74 BPM | BODY MASS INDEX: 27.7 KG/M2 | SYSTOLIC BLOOD PRESSURE: 128 MMHG | HEIGHT: 73 IN | WEIGHT: 209 LBS | OXYGEN SATURATION: 99 % | RESPIRATION RATE: 18 BRPM | DIASTOLIC BLOOD PRESSURE: 74 MMHG

## 2022-05-26 DIAGNOSIS — F41.9 ANXIETY: ICD-10-CM

## 2022-05-26 DIAGNOSIS — N63.10 BREAST MASS, RIGHT: ICD-10-CM

## 2022-05-26 DIAGNOSIS — F34.1 DYSTHYMIA: Primary | ICD-10-CM

## 2022-05-26 PROCEDURE — 99214 OFFICE O/P EST MOD 30 MIN: CPT | Performed by: NURSE PRACTITIONER

## 2022-05-26 RX ORDER — LORAZEPAM 0.5 MG/1
0.5 TABLET ORAL EVERY 6 HOURS PRN
COMMUNITY

## 2022-05-26 ASSESSMENT — ENCOUNTER SYMPTOMS
RHINORRHEA: 0
CONSTIPATION: 0
COUGH: 0
SHORTNESS OF BREATH: 0
ABDOMINAL PAIN: 0
DIARRHEA: 0
WHEEZING: 0
SORE THROAT: 0
VISUAL CHANGE: 0
NAUSEA: 0
VOMITING: 0

## 2022-05-26 ASSESSMENT — PATIENT HEALTH QUESTIONNAIRE - PHQ9
3. TROUBLE FALLING OR STAYING ASLEEP: 0
10. IF YOU CHECKED OFF ANY PROBLEMS, HOW DIFFICULT HAVE THESE PROBLEMS MADE IT FOR YOU TO DO YOUR WORK, TAKE CARE OF THINGS AT HOME, OR GET ALONG WITH OTHER PEOPLE: 0
9. THOUGHTS THAT YOU WOULD BE BETTER OFF DEAD, OR OF HURTING YOURSELF: 0
2. FEELING DOWN, DEPRESSED OR HOPELESS: 0
SUM OF ALL RESPONSES TO PHQ QUESTIONS 1-9: 0
1. LITTLE INTEREST OR PLEASURE IN DOING THINGS: 0
SUM OF ALL RESPONSES TO PHQ9 QUESTIONS 1 & 2: 0
5. POOR APPETITE OR OVEREATING: 0
6. FEELING BAD ABOUT YOURSELF - OR THAT YOU ARE A FAILURE OR HAVE LET YOURSELF OR YOUR FAMILY DOWN: 0
7. TROUBLE CONCENTRATING ON THINGS, SUCH AS READING THE NEWSPAPER OR WATCHING TELEVISION: 0
SUM OF ALL RESPONSES TO PHQ QUESTIONS 1-9: 0
SUM OF ALL RESPONSES TO PHQ QUESTIONS 1-9: 0
4. FEELING TIRED OR HAVING LITTLE ENERGY: 0
SUM OF ALL RESPONSES TO PHQ QUESTIONS 1-9: 0
8. MOVING OR SPEAKING SO SLOWLY THAT OTHER PEOPLE COULD HAVE NOTICED. OR THE OPPOSITE, BEING SO FIGETY OR RESTLESS THAT YOU HAVE BEEN MOVING AROUND A LOT MORE THAN USUAL: 0

## 2022-05-26 NOTE — PROGRESS NOTES
Name: Cecil Nazario  : 1980         Chief Complaint:     Chief Complaint   Patient presents with   Conner Bibles Problem     routine check    Breast Problem     right breast, pain with palpatations, family hx. X 3 weeks       History of Present Illness:      Cecil Nazario is a 38 y.o.  male who presents with Mental Health Problem (routine check) and Breast Problem (right breast, pain with palpatations, family hx. X 3 weeks)      [facility] visit. Overall he is feeling very well except for one bout of anxiety that he had during the winter. He states medications are working. See below for further comment. Right breast mass-noticed about 3 weeks ago. States that his family does have a history of cysts in the breast.  Men and women both. Mental Health Problem  The primary symptoms do not include dysphoric mood, delusions, hallucinations, bizarre behavior, disorganized speech, negative symptoms or somatic symptoms. The current episode started more than 1 month ago. This is a chronic problem. The onset of the illness is precipitated by emotional stress. The degree of incapacity that he is experiencing as a consequence of his illness is moderate. Sequelae of the illness include harmed interpersonal relations. Additional symptoms of the illness include agitation (controlled). Additional symptoms of the illness do not include anhedonia, insomnia, hypersomnia, appetite change, unexpected weight change, fatigue, psychomotor retardation, feelings of worthlessness, attention impairment, euphoric mood, increased goal-directed activity, flight of ideas, inflated self-esteem, decreased need for sleep, distractible, poor judgment, visual change, headaches, abdominal pain or seizures. He does not admit to suicidal ideas. He does not have a plan to attempt suicide. He does not contemplate harming himself. He has not already injured self.  He does not contemplate injuring another person. He has not already  injured another person. Risk factors that are present for mental illness include a family history of mental illness. Past Medical History:     Past Medical History:   Diagnosis Date    Anxiety     Headache       Reviewed all health maintenance requirements and ordered appropriate tests  Health Maintenance Due   Topic Date Due    Hepatitis C screen  Never done    COVID-19 Vaccine (3 - Booster for Pfizer series) 09/20/2021    Depression Monitoring  03/26/2022       Past Surgical History:     Past Surgical History:   Procedure Laterality Date    ANKLE FRACTURE SURGERY  2008    right trimalleolar fracture ORIF - Dr. Vito Shelton, LAPAROSCOPIC N/A 7/8/2020    CHOLECYSTECTOMY LAPAROSCOPIC ROBOTIC performed by Celena Dhillon MD at [address] 5/26/2017    Dr. Olivia Tyler - right shoulder SLAP repair   [address] [place] [address]    VASECTOMY  12/24/2015    Dr. Allison Ying        Medications:       Prior to Admission medications    Medication Sig Start Date End Date Taking? Authorizing Provider   LORazepam (ATIVAN) 0.5 MG tablet Take 0.5 mg by mouth every 6 hours as needed for Anxiety.    Yes Historical Provider, MD   tiZANidine (ZANAFLEX) 4 MG tablet Take 4 mg by mouth every 6 hours as needed   Yes Historical Provider, MD   ibuprofen (ADVIL;MOTRIN) 600 MG tablet Take 1 tablet by mouth 3 times daily as needed for Pain 2/17/22  Yes PAOLA Andujar CNP   escitalopram (LEXAPRO) 10 MG tablet Take 1 tablet by mouth daily  Patient taking differently: Take 10 mg by mouth at bedtime  11/24/21  Yes PAOLA Owen - CNP   colesevelam (WELCHOL) 625 MG tablet Take 1 tablet by mouth 2 times daily (with meals) 9/1/21  Yes Jarocho Romano MD   cetirizine (ZYRTEC) 10 MG tablet Take 10 mg by mouth daily as needed for Allergies    Yes Historical Provider, MD        Allergies:       Seasonal    Social History: Tobacco:    reports that he has never smoked. He has never used smokeless tobacco.  Alcohol:      reports current alcohol use. Drug Use:  reports no history of drug use. Family History:     Family History   Problem Relation Age of Onset    High Blood Pressure Father     High Cholesterol Father     Stroke Paternal Grandmother     Diabetes Paternal Grandmother     Heart Disease Paternal Grandfather         s/p CABG    Hypertension Paternal Grandfather        Review of Systems:     Positive and Negative as described in HPI    Review of Systems   Constitutional: Negative for appetite change, chills, fatigue, fever and unexpected weight change. HENT: Negative for congestion, rhinorrhea and sore throat. Eyes: Negative for visual disturbance. Respiratory: Negative for cough, shortness of breath and wheezing. Cardiovascular: Negative for chest pain and palpitations. Gastrointestinal: Negative for abdominal pain, constipation, diarrhea, nausea and vomiting. Genitourinary: Negative for difficulty urinating, dysuria, frequency and hematuria. Musculoskeletal: Negative for arthralgias, gait problem, myalgias, neck pain and neck stiffness. Skin: Negative for rash. Neurological: Negative for dizziness, seizures, syncope, light-headedness and headaches. Psychiatric/Behavioral: Positive for agitation (controlled). Negative for behavioral problems, confusion, decreased concentration, dysphoric mood, hallucinations, self-injury, sleep disturbance and suicidal ideas. The patient is nervous/anxious (controlled). The patient does not have insomnia and is not hyperactive. Physical Exam:   Vitals:  /74 (Position: Sitting)   Pulse 74   Temp 97.7 °F (36.5 °C) (Temporal)   Resp 18   Ht 6' 1\" (1.854 m)   Wt 209 lb (94.8 kg)   SpO2 99%   BMI 27.57 kg/m²     Physical Exam  Vitals and nursing note reviewed. Constitutional:       General: He is not in acute distress.      Appearance: Normal appearance. He is not ill-appearing. HENT:      Mouth/Throat:      Mouth: Mucous membranes are moist.   Eyes:      General: No scleral icterus. Conjunctiva/sclera: Conjunctivae normal.   Cardiovascular:      Rate and Rhythm: Normal rate and regular rhythm. Heart sounds: No murmur heard. Pulmonary:      Effort: Pulmonary effort is normal.      Breath sounds: Normal breath sounds. No wheezing. Chest:   Breasts:      Right: Mass present. Abdominal:      General: Bowel sounds are normal. There is no distension. Palpations: Abdomen is soft. Tenderness: There is no abdominal tenderness. Musculoskeletal:      Cervical back: Normal range of motion and neck supple. Right lower leg: No edema. Left lower leg: No edema. Skin:     General: Skin is warm and dry. Findings: No rash. Neurological:      Mental Status: He is alert and oriented to person, place, and time. Psychiatric:         Attention and Perception: Attention normal.         Mood and Affect: Mood and affect normal. Mood is not anxious or depressed. Speech: Speech normal.         Behavior: Behavior normal. Behavior is cooperative. Thought Content: Thought content normal. Thought content does not include homicidal or suicidal ideation. Thought content does not include homicidal or suicidal plan.          Cognition and Memory: Cognition normal.         Judgment: Judgment normal.         Data:     Lab Results   Component Value Date     02/17/2022    K 3.9 02/17/2022     02/17/2022    CO2 24 02/17/2022    BUN 12 02/17/2022    CREATININE 0.79 02/17/2022    GLUCOSE 84 04/26/2022    PROT 7.5 05/21/2020    LABALBU 4.7 05/21/2020    BILITOT 0.66 05/21/2020    ALKPHOS 61 05/21/2020    AST 17 05/21/2020    ALT 14 05/21/2020     Lab Results   Component Value Date    WBC 11.4 02/17/2022    RBC 4.95 02/17/2022    HGB 15.2 02/17/2022    HCT 44.8 02/17/2022    MCV 90.5 02/17/2022    MCH 30.7 02/17/2022    [facility]    RDW 12.4 02/17/2022    PLT See Reflexed IPF Result 02/17/2022    MPV NOT REPORTED 02/17/2022     No results found for: TSH  Lab Results   Component Value Date    CHOL 173 04/26/2022    HDL 64 04/26/2022       Assessment/Plan:      Diagnosis Orders   1. Dysthymia     2. Anxiety     3. Breast mass, right  US BREAST COMPLETE RIGHT     We will continue all current medications. Mood is stable. Ultrasound of the breast ordered. We will follow with results. Screening labs normal.    We will see him back in 6 months for routine check, sooner if any issues. 1.  Damien received counseling on the following healthy behaviors: nutrition, exercise and medication adherence  2. Patient given educational materials - see patient instructions  3. Was a self-tracking handout given in paper form or via [email or web address]? No  If yes, see orders or list here. 4.  Discussed use, benefit, and side effects of prescribed medications. Barriers to medication compliance addressed. All patient questions answered. Pt voiced understanding. 5.  Reviewed prior labs and health maintenance  6. Continue current medications, diet and exercise. Completed Refills   Requested Prescriptions      No prescriptions requested or ordered in this encounter         Return in about 6 months (around 11/26/2022) for Check up.

## 2022-05-26 NOTE — PATIENT INSTRUCTIONS
SURVEY:     You may be receiving a survey from [employer] regarding your visit today. Please complete the survey to enable us to provide the highest quality of care to you and your family. If you cannot score us a very good on any question, please call the office to discuss how we could have made your experience a very good one. Thank you.   Candice Brannon, APRN-EDSON Ruiz, EDSON Baird, FLORENCIO Camacho, MODESTO Archer, MODESTO Hayden, CMA  Wilma, PCA

## 2022-06-06 ENCOUNTER — HOSPITAL ENCOUNTER (OUTPATIENT)
Dept: ULTRASOUND IMAGING | Age: 42
Discharge: HOME | End: 2022-06-08
Payer: COMMERCIAL

## 2022-06-06 ENCOUNTER — TELEPHONE (OUTPATIENT)
Dept: PRIMARY CARE CLINIC | Age: 42
End: 2022-06-06

## 2022-06-06 ENCOUNTER — HOSPITAL ENCOUNTER (OUTPATIENT)
Dept: WOMENS IMAGING | Age: 42
Discharge: HOME | End: 2022-06-08
Payer: COMMERCIAL

## 2022-06-06 DIAGNOSIS — N64.4 BREAST PAIN IN MALE: Primary | ICD-10-CM

## 2022-06-06 DIAGNOSIS — N64.4 BREAST PAIN IN MALE: ICD-10-CM

## 2022-06-06 DIAGNOSIS — N63.10 BREAST MASS, RIGHT: ICD-10-CM

## 2022-06-06 PROCEDURE — 76642 ULTRASOUND BREAST LIMITED: CPT

## 2022-06-06 PROCEDURE — G0279 TOMOSYNTHESIS, MAMMO: HCPCS

## 2022-06-06 NOTE — TELEPHONE ENCOUNTER
Jamaal Michel had his breast ultrasound this a.m. The Radiologist is requesting a bilateral screening mammogram order. Jamaal Michel is there waiting, please enter order asap. Thank you!

## 2022-06-06 NOTE — TELEPHONE ENCOUNTER
----- Message from [address] [place] [address], PAOLA - CNP sent at 6/6/2022  2:32 PM EDT -----  Ultrasound and mammogram are normal.  He has a little bit of breast enlargement and a lymph node that was palpable.

## 2022-10-10 DIAGNOSIS — F34.1 DYSTHYMIA: ICD-10-CM

## 2022-10-10 DIAGNOSIS — F41.9 ANXIETY: ICD-10-CM

## 2022-10-11 RX ORDER — ESCITALOPRAM OXALATE 10 MG/1
10 TABLET ORAL DAILY
Qty: 90 TABLET | Refills: 3 | Status: SHIPPED | OUTPATIENT
Start: 2022-10-11

## 2022-10-11 NOTE — TELEPHONE ENCOUNTER
Health Maintenance   Topic Date Due    Hepatitis C screen  Never done    COVID-19 Vaccine (3 - Booster for Pfizer series) 09/20/2021    Flu vaccine (1) 08/01/2022    Depression Monitoring  05/26/2023    DTaP/Tdap/Td vaccine (7 - Td or Tdap) 11/23/2024    Lipids  04/26/2027    HIV screen  Completed    Hepatitis A vaccine  Aged Out    Hib vaccine  Aged Out    Meningococcal (ACWY) vaccine  Aged Out    Pneumococcal 0-64 years Vaccine  Aged Out             (applicable per patient's age: Cancer Screenings, Depression Screening, Fall Risk Screening, Immunizations)    LDL Cholesterol (mg/dL)   Date Value   04/26/2022 86     AST (U/L)   Date Value   05/21/2020 17     ALT (U/L)   Date Value   05/21/2020 14     BUN (mg/dL)   Date Value   02/17/2022 12      (goal A1C is < 7)   (goal LDL is <100) need 30-50% reduction from baseline     BP Readings from Last 3 Encounters:   05/26/22 128/74   02/17/22 (!) 103/58   11/24/21 118/70    (goal /80)      All Future Testing planned in [facility]:  Lab Frequency Next Occurrence   GENEVA DIGITAL SCREENING AUGMENTED BILATERAL Once 06/06/2022       Next Visit Date:  Future Appointments   Date Time Provider Stiven Phillips   11/29/2022  8:00 AM PAOLA Guidry - CNP [facility]            Patient Active Problem List:     Anxiety     Dysthymia     Biliary dyskinesia     Post-cholecystectomy syndrome

## 2022-11-29 ENCOUNTER — OFFICE VISIT (OUTPATIENT)
Dept: PRIMARY CARE CLINIC | Age: 42
End: 2022-11-29
Payer: COMMERCIAL

## 2022-11-29 VITALS
HEART RATE: 74 BPM | OXYGEN SATURATION: 99 % | TEMPERATURE: 97.7 F | RESPIRATION RATE: 18 BRPM | BODY MASS INDEX: 27.44 KG/M2 | SYSTOLIC BLOOD PRESSURE: 118 MMHG | WEIGHT: 208 LBS | DIASTOLIC BLOOD PRESSURE: 68 MMHG

## 2022-11-29 DIAGNOSIS — K91.5 POST-CHOLECYSTECTOMY SYNDROME: ICD-10-CM

## 2022-11-29 DIAGNOSIS — F34.1 DYSTHYMIA: Primary | ICD-10-CM

## 2022-11-29 DIAGNOSIS — F41.9 ANXIETY: ICD-10-CM

## 2022-11-29 DIAGNOSIS — Z23 NEED FOR INFLUENZA VACCINATION: ICD-10-CM

## 2022-11-29 PROCEDURE — 90471 IMMUNIZATION ADMIN: CPT | Performed by: NURSE PRACTITIONER

## 2022-11-29 PROCEDURE — 99214 OFFICE O/P EST MOD 30 MIN: CPT | Performed by: NURSE PRACTITIONER

## 2022-11-29 PROCEDURE — 90674 CCIIV4 VAC NO PRSV 0.5 ML IM: CPT | Performed by: NURSE PRACTITIONER

## 2022-11-29 RX ORDER — BUPROPION HYDROCHLORIDE 150 MG/1
150 TABLET ORAL EVERY MORNING
Qty: 90 TABLET | Refills: 1 | Status: SHIPPED | OUTPATIENT
Start: 2022-11-29

## 2022-11-29 RX ORDER — BUPROPION HYDROCHLORIDE 150 MG/1
150 TABLET ORAL EVERY MORNING
Qty: 90 TABLET | Refills: 1 | Status: SHIPPED | OUTPATIENT
Start: 2022-11-29 | End: 2022-11-29 | Stop reason: SDUPTHER

## 2022-11-29 RX ORDER — DIAZEPAM 5 MG/1
5 TABLET ORAL 2 TIMES DAILY PRN
Qty: 60 TABLET | Refills: 0 | Status: SHIPPED | OUTPATIENT
Start: 2022-11-29 | End: 2022-12-29

## 2022-11-29 SDOH — ECONOMIC STABILITY: FOOD INSECURITY: WITHIN THE PAST 12 MONTHS, THE FOOD YOU BOUGHT JUST DIDN'T LAST AND YOU DIDN'T HAVE MONEY TO GET MORE.: PATIENT DECLINED

## 2022-11-29 SDOH — ECONOMIC STABILITY: FOOD INSECURITY: WITHIN THE PAST 12 MONTHS, YOU WORRIED THAT YOUR FOOD WOULD RUN OUT BEFORE YOU GOT MONEY TO BUY MORE.: PATIENT DECLINED

## 2022-11-29 ASSESSMENT — PATIENT HEALTH QUESTIONNAIRE - PHQ9
3. TROUBLE FALLING OR STAYING ASLEEP: 0
1. LITTLE INTEREST OR PLEASURE IN DOING THINGS: 0
SUM OF ALL RESPONSES TO PHQ QUESTIONS 1-9: 0
4. FEELING TIRED OR HAVING LITTLE ENERGY: 0
SUM OF ALL RESPONSES TO PHQ9 QUESTIONS 1 & 2: 0
6. FEELING BAD ABOUT YOURSELF - OR THAT YOU ARE A FAILURE OR HAVE LET YOURSELF OR YOUR FAMILY DOWN: 0
SUM OF ALL RESPONSES TO PHQ QUESTIONS 1-9: 0
7. TROUBLE CONCENTRATING ON THINGS, SUCH AS READING THE NEWSPAPER OR WATCHING TELEVISION: 0
10. IF YOU CHECKED OFF ANY PROBLEMS, HOW DIFFICULT HAVE THESE PROBLEMS MADE IT FOR YOU TO DO YOUR WORK, TAKE CARE OF THINGS AT HOME, OR GET ALONG WITH OTHER PEOPLE: 0
5. POOR APPETITE OR OVEREATING: 0
SUM OF ALL RESPONSES TO PHQ QUESTIONS 1-9: 0
SUM OF ALL RESPONSES TO PHQ QUESTIONS 1-9: 0
8. MOVING OR SPEAKING SO SLOWLY THAT OTHER PEOPLE COULD HAVE NOTICED. OR THE OPPOSITE, BEING SO FIGETY OR RESTLESS THAT YOU HAVE BEEN MOVING AROUND A LOT MORE THAN USUAL: 0
2. FEELING DOWN, DEPRESSED OR HOPELESS: 0
9. THOUGHTS THAT YOU WOULD BE BETTER OFF DEAD, OR OF HURTING YOURSELF: 0

## 2022-11-29 ASSESSMENT — CROHNS DISEASE ACTIVITY INDEX (CDAI): CDAI SCORE: 0

## 2022-11-29 ASSESSMENT — ENCOUNTER SYMPTOMS
SHORTNESS OF BREATH: 0
COUGH: 0
HEMATOCHEZIA: 0
CRAMPS: 1
WHEEZING: 0
CONSTIPATION: 0
RHINORRHEA: 0
SORE THROAT: 0
VOMITING: 0
VISUAL CHANGE: 0
BELCHING: 0
ABDOMINAL PAIN: 1
DIARRHEA: 1
NAUSEA: 0
FLATUS: 0

## 2022-11-29 ASSESSMENT — SOCIAL DETERMINANTS OF HEALTH (SDOH): HOW HARD IS IT FOR YOU TO PAY FOR THE VERY BASICS LIKE FOOD, HOUSING, MEDICAL CARE, AND HEATING?: PATIENT DECLINED

## 2022-11-29 NOTE — PROGRESS NOTES
Name: Carilyn Epley  : 1980         Chief Complaint:     Chief Complaint   Patient presents with    Mental Health Problem     Routine check. Abdominal Cramping       History of Present Illness:      Carilyn Epley is a 38 y.o.  male who presents with Mental Health Problem (Routine check.) and Abdominal Devoria Lard is here today for a routine office visit. Dysthymia/nervousness-dysthymia is worsening which in turn increases his anxiety. He states some days he has no motivation and feels like he is getting nothing accomplished. He states this is affecting his work and home life. See below for further comment. Postcholecystectomy syndrome-stable, but patient does feel his depression and anxiety do play a part in his abdominal discomfort. See below for further comment. Mental Health Problem  The primary symptoms include dysphoric mood, negative symptoms and somatic symptoms. The primary symptoms do not include delusions, hallucinations, bizarre behavior or disorganized speech. The current episode started more than 1 month ago. This is a chronic problem. The dysphoric mood began more than 2 weeks ago. The mood has been worsening since its onset. He characterizes the problem as moderate. The mood includes feelings of sadness and irritability. His change in mood was precipitated by a stressful event. The negative symptoms began more than 1 month ago. The negative symptoms appear to have been worsening since their onset. The negative symptoms include anhedonia and attention impairment. The somatic symptoms began more than 1 month ago. The somatic symptoms have been worsening since their onset. The symptoms are moderate. Somatic symptoms include fatigue and abdominal pain (intermittently). Somatic symptoms do not include headaches, constipation or myalgias. The onset of the illness is precipitated by emotional stress.  The degree of incapacity that he is experiencing as a consequence of his illness is moderate. Sequelae of the illness include harmed interpersonal relations. Additional symptoms of the illness include anhedonia, fatigue, agitation, attention impairment, distractible and abdominal pain (intermittently). Additional symptoms of the illness do not include insomnia, hypersomnia, appetite change, unexpected weight change, psychomotor retardation, feelings of worthlessness, euphoric mood, increased goal-directed activity, flight of ideas, inflated self-esteem, decreased need for sleep, poor judgment, visual change, headaches or seizures. He does not admit to suicidal ideas. He does not have a plan to attempt suicide. He does not contemplate harming himself. He has not already injured self. He does not contemplate injuring another person. He has not already  injured another person. Risk factors that are present for mental illness include a family history of mental illness. Abdominal Cramping  This is a chronic problem. The current episode started more than 1 year ago. The onset quality is gradual. The problem occurs intermittently. Duration: HOURS. The problem has been unchanged. The pain is located in the generalized abdominal region. The pain is at a severity of 2/10. The pain is mild. The quality of the pain is colicky and cramping. The abdominal pain does not radiate. Associated symptoms include diarrhea (INTERMITTENTLY). Pertinent negatives include no anorexia, arthralgias, belching, constipation, dysuria, fever, flatus, frequency, headaches, hematochezia, hematuria, melena, myalgias, nausea, vomiting or weight loss. The pain is aggravated by eating. Treatments tried: [facility]. The treatment provided moderate relief. Prior diagnostic workup includes GI consult, ultrasound and CT scan. His past medical history is significant for abdominal surgery, GERD and irritable bowel syndrome.  There is no history of colon cancer, Crohn's disease, gallstones, pancreatitis, PUD or ulcerative colitis. Past Medical History:     Past Medical History:   Diagnosis Date    Anxiety     Headache       Reviewed all health maintenance requirements and ordered appropriate tests  There are no preventive care reminders to display for this patient. Past Surgical History:     Past Surgical History:   Procedure Laterality Date    ANKLE FRACTURE SURGERY  2008    right trimalleolar fracture ORIF - Dr. Celine Ospina, LAPAROSCOPIC N/A 07/08/2020    CHOLECYSTECTOMY LAPAROSCOPIC ROBOTIC performed by Zara Moore MD at [facility]  2008    SHOULDER ARTHROSCOPY Right 05/26/2017    Dr. Melissa Spear - right shoulder SLAP repair    [address]  12/24/2015    Dr. Trang Cerna        Medications:       Prior to Admission medications    Medication Sig Start Date End Date Taking? Authorizing Provider   Lansoprazole (PREVACID PO) Take by mouth   Yes Historical Provider, MD   diazePAM (VALIUM) 5 MG tablet Take 1 tablet by mouth 2 times daily as needed for Anxiety for up to 30 days. 11/29/22 12/29/22 Yes PAOLA Lind - CNP   buPROPion (WELLBUTRIN XL) 150 MG extended release tablet Take 1 tablet by mouth every morning 11/29/22  Yes PAOLA Lind - CNP   escitalopram (LEXAPRO) 10 MG tablet Take 1 tablet by mouth daily 10/11/22  Yes PAOLA Dutta CNP   ibuprofen (ADVIL;MOTRIN) 600 MG tablet Take 1 tablet by mouth 3 times daily as needed for Pain 2/17/22  Yes PAOLA Harper - CNP   [facility]) 625 MG tablet Take 1 tablet by mouth 2 times daily (with meals) 9/1/21  Yes Fern Bowling MD   cetirizine (ZYRTEC) 10 MG tablet Take 10 mg by mouth daily as needed for Allergies    Yes Historical Provider, MD        Allergies:       Seasonal    Social History:     Tobacco:    reports that he has never smoked.  He has never used smokeless tobacco.  Alcohol:      reports current alcohol use of about 4.0 standard drinks per week.  Drug Use:  reports no history of drug use. Family History:     Family History   Problem Relation Age of Onset    High Blood Pressure Father     High Cholesterol Father     Stroke Paternal Grandmother     Diabetes Paternal Grandmother     Heart Disease Paternal Grandfather         s/p CABG    Hypertension Paternal Grandfather        Review of Systems:     Positive and Negative as described in HPI    Review of Systems   Constitutional:  Positive for fatigue. Negative for appetite change, chills, fever, unexpected weight change and weight loss. HENT:  Negative for congestion, rhinorrhea and sore throat. Eyes:  Negative for visual disturbance. Respiratory:  Negative for cough, shortness of breath and wheezing. Cardiovascular:  Negative for chest pain and palpitations. Gastrointestinal:  Positive for abdominal pain (intermittently) and diarrhea (INTERMITTENTLY). Negative for anorexia, constipation, flatus, hematochezia, melena, nausea and vomiting. Genitourinary:  Negative for difficulty urinating, dysuria, frequency and hematuria. Musculoskeletal:  Negative for arthralgias, gait problem, myalgias, neck pain and neck stiffness. Skin:  Negative for rash. Neurological:  Negative for dizziness, seizures, syncope, light-headedness and headaches. Psychiatric/Behavioral:  Positive for agitation, decreased concentration and dysphoric mood. Negative for behavioral problems, confusion, hallucinations, self-injury, sleep disturbance and suicidal ideas. The patient is nervous/anxious. The patient does not have insomnia and is not hyperactive. Physical Exam:   Vitals:  /68 (Position: Sitting)   Pulse 74   Temp 97.7 °F (36.5 °C) (Temporal)   Resp 18   Wt 208 lb (94.3 kg)   SpO2 99%   BMI 27.44 kg/m²     Physical Exam  Vitals and nursing note reviewed. Constitutional:       General: He is not in acute distress. Appearance: Normal appearance. He is not ill-appearing.    HENT: Mouth/Throat:      Mouth: Mucous membranes are moist.   Eyes:      General: No scleral icterus. Conjunctiva/sclera: Conjunctivae normal.   Cardiovascular:      Rate and Rhythm: Normal rate and regular rhythm. Heart sounds: No murmur heard. Pulmonary:      Effort: Pulmonary effort is normal.      Breath sounds: Normal breath sounds. No wheezing. Abdominal:      General: Bowel sounds are normal. There is no distension. Palpations: Abdomen is soft. Tenderness: There is no abdominal tenderness. Musculoskeletal:      Cervical back: Normal range of motion and neck supple. Right lower leg: No edema. Left lower leg: No edema. Skin:     General: Skin is warm and dry. Findings: No rash. Neurological:      Mental Status: He is alert and oriented to person, place, and time. Psychiatric:         Attention and Perception: Attention normal.         Mood and Affect: Affect normal. Mood is depressed (mildly down). Mood is not anxious. Speech: Speech normal.         Behavior: Behavior normal. Behavior is cooperative. Thought Content: Thought content normal. Thought content does not include homicidal or suicidal ideation. Thought content does not include homicidal or suicidal plan.          Cognition and Memory: Cognition normal.         Judgment: Judgment normal.       Data:     Lab Results   Component Value Date/Time     02/17/2022 05:11 PM    K 3.9 02/17/2022 05:11 PM     02/17/2022 05:11 PM    CO2 24 02/17/2022 05:11 PM    BUN 12 02/17/2022 05:11 PM    CREATININE 0.79 02/17/2022 05:11 PM    GLUCOSE 84 04/26/2022 08:23 AM    PROT 7.5 05/21/2020 09:08 AM    LABALBU 4.7 05/21/2020 09:08 AM    BILITOT 0.66 05/21/2020 09:08 AM    ALKPHOS 61 05/21/2020 09:08 AM    AST 17 05/21/2020 09:08 AM    ALT 14 05/21/2020 09:08 AM     Lab Results   Component Value Date/Time    WBC 11.4 02/17/2022 05:11 PM    RBC 4.95 02/17/2022 05:11 PM    HGB 15.2 02/17/2022 05:11 PM    HCT 44.8 02/17/2022 05:11 PM    MCV 90.5 02/17/2022 05:11 PM    MCH 30.7 02/17/2022 05:11 PM    MCHC 33.9 02/17/2022 05:11 PM    RDW 12.4 02/17/2022 05:11 PM    PLT See Reflexed IPF Result 02/17/2022 05:11 PM    MPV NOT REPORTED 02/17/2022 05:11 PM     No results found for: TSH  Lab Results   Component Value Date/Time    CHOL 173 04/26/2022 08:23 AM    HDL 64 04/26/2022 08:23 AM       Assessment/Plan:      Diagnosis Orders   1. Dysthymia  buPROPion (WELLBUTRIN XL) 150 MG extended release tablet    DISCONTINUED: buPROPion (WELLBUTRIN XL) 150 MG extended release tablet      2. Anxiety  diazePAM (VALIUM) 5 MG tablet      3. Post-cholecystectomy syndrome        4. Need for influenza vaccination  Influenza, FLUCELVAX, (age 10 mo+), IM, Preservative Free, 0.5 mL        We will continue current medications and add bupropion 150 mg XL daily. He will call in a few weeks with progress, sooner if any issues. Discontinue lorazepam and may use diazepam for severe anxiety use only. Patient states he was given this in the emergency department and it helped his tension as well as anxiety. We will see him back in 6 months for routine check, sooner if any issues. 1.  Damien received counseling on the following healthy behaviors: nutrition, exercise, and medication adherence  2. Patient given educational materials - see patient instructions  3. Was a self-tracking handout given in paper form or via [employer]? No  If yes, see orders or list here. 4.  Discussed use, benefit, and side effects of prescribed medications. Barriers to medication compliance addressed. All patient questions answered. Pt voiced understanding. 5.  Reviewed prior labs and health maintenance  6. Continue current medications, diet and exercise.     Completed Refills   Requested Prescriptions     Signed Prescriptions Disp Refills    diazePAM (VALIUM) 5 MG tablet 60 tablet 0     Sig: Take 1 tablet by mouth 2 times daily as needed for Anxiety for up to 30 days.    buPROPion (WELLBUTRIN XL) 150 MG extended release tablet 90 tablet 1     Sig: Take 1 tablet by mouth every morning         Return in about 6 months (around 5/29/2023) for Check up.

## 2022-11-29 NOTE — PATIENT INSTRUCTIONS
SURVEY:     You may be receiving a survey from [employer] regarding your visit today. Please complete the survey to enable us to provide the highest quality of care to you and your family. If you cannot score us a very good on any question, please call the office to discuss how we could have made your experience a very good one.      Thank you,    Jacqueline Brannon, APRN-EDSON Moore, APRN-CNP  Christin Bio, LPN  Saima Rogers, MODESTO Handley, MODESTO Hayden, CMA  Wilma, PCA  Nu, PM

## 2022-11-29 NOTE — PROGRESS NOTES
After obtaining consent, and per orders of Dr. Summer Brannon CNP, injection of Flu given in Left deltoid by Daryl Dangelo LPN. Patient instructed to remain in clinic for 20 minutes afterwards, and to report any adverse reaction to me immediately. Vaccine Information Sheet, \"Influenza - Inactivated\"  given to Bart Traore, or parent/legal guardian of  Bart Traore and verbalized understanding. Patient responses:    Have you ever had a reaction to a flu vaccine? No  Are you able to eat eggs without adverse effects? Yes  Do you have any current illness? No  Have you ever had Guillian [place] Syndrome? No    Flu vaccine given per order. Please see immunization tab.

## 2023-01-21 DIAGNOSIS — F41.9 ANXIETY: ICD-10-CM

## 2023-01-21 DIAGNOSIS — F34.1 DYSTHYMIA: ICD-10-CM

## 2023-01-22 RX ORDER — ESCITALOPRAM OXALATE 10 MG/1
10 TABLET ORAL DAILY
Qty: 90 TABLET | Refills: 3 | Status: SHIPPED | OUTPATIENT
Start: 2023-01-22

## 2023-04-25 DIAGNOSIS — F41.9 ANXIETY: ICD-10-CM

## 2023-04-25 DIAGNOSIS — F34.1 DYSTHYMIA: ICD-10-CM

## 2023-04-26 RX ORDER — ESCITALOPRAM OXALATE 10 MG/1
10 TABLET ORAL DAILY
Qty: 90 TABLET | Refills: 3 | Status: SHIPPED | OUTPATIENT
Start: 2023-04-26

## 2023-04-26 RX ORDER — BUPROPION HYDROCHLORIDE 150 MG/1
150 TABLET ORAL EVERY MORNING
Qty: 90 TABLET | Refills: 3 | Status: SHIPPED | OUTPATIENT
Start: 2023-04-26

## 2023-05-30 ENCOUNTER — OFFICE VISIT (OUTPATIENT)
Dept: PRIMARY CARE CLINIC | Age: 43
End: 2023-05-30
Payer: COMMERCIAL

## 2023-05-30 VITALS
HEIGHT: 73 IN | BODY MASS INDEX: 27.71 KG/M2 | SYSTOLIC BLOOD PRESSURE: 120 MMHG | WEIGHT: 209.1 LBS | HEART RATE: 82 BPM | TEMPERATURE: 97.7 F | DIASTOLIC BLOOD PRESSURE: 78 MMHG | RESPIRATION RATE: 18 BRPM | OXYGEN SATURATION: 98 %

## 2023-05-30 DIAGNOSIS — Z12.11 SCREEN FOR COLON CANCER: ICD-10-CM

## 2023-05-30 DIAGNOSIS — F41.9 ANXIETY: ICD-10-CM

## 2023-05-30 DIAGNOSIS — K91.5 POST-CHOLECYSTECTOMY SYNDROME: ICD-10-CM

## 2023-05-30 DIAGNOSIS — F34.1 DYSTHYMIA: Primary | ICD-10-CM

## 2023-05-30 PROCEDURE — 99214 OFFICE O/P EST MOD 30 MIN: CPT | Performed by: NURSE PRACTITIONER

## 2023-05-30 RX ORDER — DULOXETIN HYDROCHLORIDE 30 MG/1
30 CAPSULE, DELAYED RELEASE ORAL DAILY
Qty: 90 CAPSULE | Refills: 0 | Status: SHIPPED | OUTPATIENT
Start: 2023-05-30

## 2023-05-30 SDOH — ECONOMIC STABILITY: INCOME INSECURITY: HOW HARD IS IT FOR YOU TO PAY FOR THE VERY BASICS LIKE FOOD, HOUSING, MEDICAL CARE, AND HEATING?: PATIENT DECLINED

## 2023-05-30 SDOH — ECONOMIC STABILITY: FOOD INSECURITY: WITHIN THE PAST 12 MONTHS, THE FOOD YOU BOUGHT JUST DIDN'T LAST AND YOU DIDN'T HAVE MONEY TO GET MORE.: PATIENT DECLINED

## 2023-05-30 SDOH — ECONOMIC STABILITY: HOUSING INSECURITY
IN THE LAST 12 MONTHS, WAS THERE A TIME WHEN YOU DID NOT HAVE A STEADY PLACE TO SLEEP OR SLEPT IN A SHELTER (INCLUDING NOW)?: PATIENT REFUSED

## 2023-05-30 SDOH — ECONOMIC STABILITY: FOOD INSECURITY: WITHIN THE PAST 12 MONTHS, YOU WORRIED THAT YOUR FOOD WOULD RUN OUT BEFORE YOU GOT MONEY TO BUY MORE.: PATIENT DECLINED

## 2023-05-30 ASSESSMENT — ENCOUNTER SYMPTOMS
DIARRHEA: 0
CONSTIPATION: 0
RHINORRHEA: 0
FLATUS: 0
HEMATOCHEZIA: 0
SHORTNESS OF BREATH: 0
NAUSEA: 0
ABDOMINAL PAIN: 1
CRAMPS: 1
WHEEZING: 0
BELCHING: 0
COUGH: 0
SORE THROAT: 0
VOMITING: 0
VISUAL CHANGE: 0

## 2023-05-30 ASSESSMENT — PATIENT HEALTH QUESTIONNAIRE - PHQ9
SUM OF ALL RESPONSES TO PHQ9 QUESTIONS 1 & 2: 0
SUM OF ALL RESPONSES TO PHQ QUESTIONS 1-9: 0
4. FEELING TIRED OR HAVING LITTLE ENERGY: 0
SUM OF ALL RESPONSES TO PHQ QUESTIONS 1-9: 0
1. LITTLE INTEREST OR PLEASURE IN DOING THINGS: 0
SUM OF ALL RESPONSES TO PHQ QUESTIONS 1-9: 0
3. TROUBLE FALLING OR STAYING ASLEEP: 0
9. THOUGHTS THAT YOU WOULD BE BETTER OFF DEAD, OR OF HURTING YOURSELF: 0
7. TROUBLE CONCENTRATING ON THINGS, SUCH AS READING THE NEWSPAPER OR WATCHING TELEVISION: 0
SUM OF ALL RESPONSES TO PHQ QUESTIONS 1-9: 0
2. FEELING DOWN, DEPRESSED OR HOPELESS: 0
8. MOVING OR SPEAKING SO SLOWLY THAT OTHER PEOPLE COULD HAVE NOTICED. OR THE OPPOSITE, BEING SO FIGETY OR RESTLESS THAT YOU HAVE BEEN MOVING AROUND A LOT MORE THAN USUAL: 0
5. POOR APPETITE OR OVEREATING: 0
10. IF YOU CHECKED OFF ANY PROBLEMS, HOW DIFFICULT HAVE THESE PROBLEMS MADE IT FOR YOU TO DO YOUR WORK, TAKE CARE OF THINGS AT HOME, OR GET ALONG WITH OTHER PEOPLE: 0
6. FEELING BAD ABOUT YOURSELF - OR THAT YOU ARE A FAILURE OR HAVE LET YOURSELF OR YOUR FAMILY DOWN: 0

## 2023-05-30 ASSESSMENT — CROHNS DISEASE ACTIVITY INDEX (CDAI): CDAI SCORE: 0
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
## 2023-05-30 NOTE — PATIENT INSTRUCTIONS
SURVEY:     You may be receiving a survey from [employer] regarding your visit today. Please complete the survey to enable us to provide the highest quality of care to you and your family. If you cannot score us a very good on any question, please call the office to discuss how we could have made your experience a very good one.      Thank you,    Yusuf Me Might, APRN-CNP  Ashley Anand, APRN-CNP  King Arelis, LPN  Agnieszka Velarde, MODESTO Altman, MODESTO Hayden, CMA  Wilma, PCA  Nu, PM

## 2023-06-26 LAB — NONINV COLON CA DNA+OCC BLD SCRN STL QL: NEGATIVE

## 2023-06-27 ENCOUNTER — TELEPHONE (OUTPATIENT)
Dept: PRIMARY CARE CLINIC | Age: 43
End: 2023-06-27

## 2023-06-27 DIAGNOSIS — F34.1 DYSTHYMIA: ICD-10-CM

## 2023-06-27 DIAGNOSIS — F41.9 ANXIETY: ICD-10-CM

## 2023-06-27 RX ORDER — DULOXETIN HYDROCHLORIDE 60 MG/1
60 CAPSULE, DELAYED RELEASE ORAL DAILY
Qty: 90 CAPSULE | Refills: 3 | Status: SHIPPED | OUTPATIENT
Start: 2023-06-27

## 2023-06-27 RX ORDER — DULOXETIN HYDROCHLORIDE 60 MG/1
60 CAPSULE, DELAYED RELEASE ORAL DAILY
Qty: 90 CAPSULE | Refills: 0 | Status: SHIPPED | OUTPATIENT
Start: 2023-06-27 | End: 2023-06-27 | Stop reason: SDUPTHER

## 2023-07-29 DIAGNOSIS — F41.9 ANXIETY: ICD-10-CM

## 2023-07-29 DIAGNOSIS — F34.1 DYSTHYMIA: ICD-10-CM

## 2023-07-31 RX ORDER — COLESEVELAM 180 1/1
625 TABLET ORAL 2 TIMES DAILY WITH MEALS
Qty: 180 TABLET | Refills: 3 | Status: SHIPPED | OUTPATIENT
Start: 2023-07-31

## 2023-07-31 RX ORDER — DULOXETIN HYDROCHLORIDE 60 MG/1
60 CAPSULE, DELAYED RELEASE ORAL DAILY
Qty: 90 CAPSULE | Refills: 3 | Status: SHIPPED | OUTPATIENT
Start: 2023-07-31

## 2023-07-31 RX ORDER — BUPROPION HYDROCHLORIDE 150 MG/1
150 TABLET ORAL EVERY MORNING
Qty: 90 TABLET | Refills: 3 | Status: SHIPPED | OUTPATIENT
Start: 2023-07-31 | End: 2023-08-01 | Stop reason: ALTCHOICE

## 2023-11-03 ENCOUNTER — NURSE TRIAGE (OUTPATIENT)
Dept: OTHER | Facility: CLINIC | Age: 43
End: 2023-11-03

## 2023-11-03 ENCOUNTER — HOSPITAL ENCOUNTER (EMERGENCY)
Age: 43
Discharge: HOME | End: 2023-11-03
Attending: STUDENT IN AN ORGANIZED HEALTH CARE EDUCATION/TRAINING PROGRAM
Payer: COMMERCIAL

## 2023-11-03 VITALS
SYSTOLIC BLOOD PRESSURE: 120 MMHG | HEART RATE: 100 BPM | RESPIRATION RATE: 14 BRPM | OXYGEN SATURATION: 97 % | TEMPERATURE: 97.1 F | DIASTOLIC BLOOD PRESSURE: 76 MMHG

## 2023-11-03 DIAGNOSIS — S01.81XA FACIAL LACERATION, INITIAL ENCOUNTER: Primary | ICD-10-CM

## 2023-11-03 PROCEDURE — 99284 EMERGENCY DEPT VISIT MOD MDM: CPT

## 2023-11-03 PROCEDURE — 90715 TDAP VACCINE 7 YRS/> IM: CPT | Performed by: STUDENT IN AN ORGANIZED HEALTH CARE EDUCATION/TRAINING PROGRAM

## 2023-11-03 PROCEDURE — 6360000002 HC RX W HCPCS: Performed by: STUDENT IN AN ORGANIZED HEALTH CARE EDUCATION/TRAINING PROGRAM

## 2023-11-03 PROCEDURE — 90471 IMMUNIZATION ADMIN: CPT | Performed by: STUDENT IN AN ORGANIZED HEALTH CARE EDUCATION/TRAINING PROGRAM

## 2023-11-03 RX ADMIN — TETANUS TOXOID, REDUCED DIPHTHERIA TOXOID AND ACELLULAR PERTUSSIS VACCINE, ADSORBED 0.5 ML: 5; 2.5; 8; 8; 2.5 SUSPENSION INTRAMUSCULAR at 21:15
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
## 2023-11-14 DIAGNOSIS — F41.9 ANXIETY: ICD-10-CM

## 2023-11-14 DIAGNOSIS — F34.1 DYSTHYMIA: ICD-10-CM

## 2023-11-14 RX ORDER — COLESEVELAM 180 1/1
625 TABLET ORAL 2 TIMES DAILY WITH MEALS
Qty: 180 TABLET | Refills: 3 | Status: SHIPPED | OUTPATIENT
Start: 2023-11-14

## 2023-11-14 RX ORDER — DULOXETIN HYDROCHLORIDE 60 MG/1
60 CAPSULE, DELAYED RELEASE ORAL DAILY
Qty: 90 CAPSULE | Refills: 3 | Status: SHIPPED | OUTPATIENT
Start: 2023-11-14

## 2023-11-30 ENCOUNTER — OFFICE VISIT (OUTPATIENT)
Dept: PRIMARY CARE CLINIC | Age: 43
End: 2023-11-30
Payer: COMMERCIAL

## 2023-11-30 VITALS
RESPIRATION RATE: 20 BRPM | DIASTOLIC BLOOD PRESSURE: 74 MMHG | BODY MASS INDEX: 27.4 KG/M2 | WEIGHT: 207.7 LBS | OXYGEN SATURATION: 99 % | HEART RATE: 84 BPM | SYSTOLIC BLOOD PRESSURE: 122 MMHG | TEMPERATURE: 98.5 F

## 2023-11-30 DIAGNOSIS — Z23 NEED FOR INFLUENZA VACCINATION: ICD-10-CM

## 2023-11-30 DIAGNOSIS — F41.9 ANXIETY: ICD-10-CM

## 2023-11-30 DIAGNOSIS — F34.1 DYSTHYMIA: Primary | ICD-10-CM

## 2023-11-30 DIAGNOSIS — K91.5 POST-CHOLECYSTECTOMY SYNDROME: ICD-10-CM

## 2023-11-30 PROCEDURE — 99214 OFFICE O/P EST MOD 30 MIN: CPT | Performed by: NURSE PRACTITIONER

## 2023-11-30 PROCEDURE — 90674 CCIIV4 VAC NO PRSV 0.5 ML IM: CPT | Performed by: NURSE PRACTITIONER

## 2023-11-30 PROCEDURE — 90471 IMMUNIZATION ADMIN: CPT | Performed by: NURSE PRACTITIONER

## 2023-11-30 SDOH — ECONOMIC STABILITY: FOOD INSECURITY: WITHIN THE PAST 12 MONTHS, YOU WORRIED THAT YOUR FOOD WOULD RUN OUT BEFORE YOU GOT MONEY TO BUY MORE.: PATIENT DECLINED

## 2023-11-30 SDOH — ECONOMIC STABILITY: INCOME INSECURITY: HOW HARD IS IT FOR YOU TO PAY FOR THE VERY BASICS LIKE FOOD, HOUSING, MEDICAL CARE, AND HEATING?: PATIENT DECLINED

## 2023-11-30 SDOH — ECONOMIC STABILITY: FOOD INSECURITY: WITHIN THE PAST 12 MONTHS, THE FOOD YOU BOUGHT JUST DIDN'T LAST AND YOU DIDN'T HAVE MONEY TO GET MORE.: PATIENT DECLINED

## 2023-11-30 ASSESSMENT — ENCOUNTER SYMPTOMS
SHORTNESS OF BREATH: 0
BELCHING: 0
DIARRHEA: 1
ABDOMINAL PAIN: 0
SORE THROAT: 0
VOMITING: 0
COUGH: 0
RHINORRHEA: 0
VISUAL CHANGE: 0
WHEEZING: 0
HEMATOCHEZIA: 0
CRAMPS: 1
CONSTIPATION: 0
NAUSEA: 0
FLATUS: 0

## 2023-11-30 ASSESSMENT — PATIENT HEALTH QUESTIONNAIRE - PHQ9
2. FEELING DOWN, DEPRESSED OR HOPELESS: 0
SUM OF ALL RESPONSES TO PHQ QUESTIONS 1-9: 0
1. LITTLE INTEREST OR PLEASURE IN DOING THINGS: 0
9. THOUGHTS THAT YOU WOULD BE BETTER OFF DEAD, OR OF HURTING YOURSELF: 0
10. IF YOU CHECKED OFF ANY PROBLEMS, HOW DIFFICULT HAVE THESE PROBLEMS MADE IT FOR YOU TO DO YOUR WORK, TAKE CARE OF THINGS AT HOME, OR GET ALONG WITH OTHER PEOPLE: 0
6. FEELING BAD ABOUT YOURSELF - OR THAT YOU ARE A FAILURE OR HAVE LET YOURSELF OR YOUR FAMILY DOWN: 0
8. MOVING OR SPEAKING SO SLOWLY THAT OTHER PEOPLE COULD HAVE NOTICED. OR THE OPPOSITE, BEING SO FIGETY OR RESTLESS THAT YOU HAVE BEEN MOVING AROUND A LOT MORE THAN USUAL: 0
SUM OF ALL RESPONSES TO PHQ QUESTIONS 1-9: 0
SUM OF ALL RESPONSES TO PHQ QUESTIONS 1-9: 0
5. POOR APPETITE OR OVEREATING: 0
SUM OF ALL RESPONSES TO PHQ9 QUESTIONS 1 & 2: 0
4. FEELING TIRED OR HAVING LITTLE ENERGY: 0
7. TROUBLE CONCENTRATING ON THINGS, SUCH AS READING THE NEWSPAPER OR WATCHING TELEVISION: 0
3. TROUBLE FALLING OR STAYING ASLEEP: 0
SUM OF ALL RESPONSES TO PHQ QUESTIONS 1-9: 0

## 2023-11-30 ASSESSMENT — COLUMBIA-SUICIDE SEVERITY RATING SCALE - C-SSRS
3. HAVE YOU BEEN THINKING ABOUT HOW YOU MIGHT KILL YOURSELF?: NO
4. HAVE YOU HAD THESE THOUGHTS AND HAD SOME INTENTION OF ACTING ON THEM?: NO
7. DID THIS OCCUR IN THE LAST THREE MONTHS: NO
5. HAVE YOU STARTED TO WORK OUT OR WORKED OUT THE DETAILS OF HOW TO KILL YOURSELF? DO YOU INTEND TO CARRY OUT THIS PLAN?: NO

## 2023-11-30 ASSESSMENT — CROHNS DISEASE ACTIVITY INDEX (CDAI): CDAI SCORE: 0

## 2023-11-30 NOTE — PROGRESS NOTES
After obtaining consent, and per orders of WADE ZAPATA CNP, injection of INFLUENZA given in Left deltoid by Callum Bryan CMA. Patient instructed to remain in clinic for 20 minutes afterwards, and to report any adverse reaction to me immediately. Vaccine Information Sheet, \"Influenza - Inactivated\"  given to Charles Morgan, or parent/legal guardian of  Charles Morgan and verbalized understanding. Patient responses:    Have you ever had a reaction to a flu vaccine? No  Are you able to eat eggs without adverse effects? Yes  Do you have any current illness? No  Have you ever had Guillian [place] Syndrome? No    Flu vaccine given per order. Please see immunization tab.

## 2023-11-30 NOTE — PROGRESS NOTES
Name: Raleigh Halsted  : 1980         Chief Complaint:     Chief Complaint   Patient presents with    Mental Health Problem     Routine check. No concerns. Diarrhea       History of Present Illness:      Raleigh Halsted is a 37 y.o.  male who presents with Mental Health Problem (Routine check. No concerns.) and Diarrhea      Julia Howell is here today for a routine office visit. Dysthymia/nervousness-he states he is feeling pretty well but can get a little agitated here and there. He states also he has had some issues with decreased libido. He has not been having any movements in his sleep since discontinuing bupropion. He states he may have a little jerking right before he falls asleep but other than that his wife has not noticed any of the previous behavior. He did note that he stopped taking duloxetine at night and start taking in the morning and thinks this is what is causing some of his problems because he has not used it yet. He would like to continue this for a few more weeks and let us know if the agitation dissipates. See below for further comment. Postcholecystectomy syndrome-stable, patient is using WelChol with good result. He states he does not take the medication twice daily every day and is fine with the way he uses it. He states if he knows he is going to eat a food that will cause distress he will take the medication preemptively. See below for further comments. Mental Health Problem  The primary symptoms do not include dysphoric mood, delusions, hallucinations, bizarre behavior, disorganized speech, negative symptoms or somatic symptoms. The current episode started more than 1 month ago. This is a chronic problem. The onset of the illness is precipitated by emotional stress. The degree of incapacity that he is experiencing as a consequence of his illness is moderate. Sequelae of the illness include harmed interpersonal relations.  Additional symptoms of the illness

## 2023-11-30 NOTE — PATIENT INSTRUCTIONS
SURVEY:     You may be receiving a survey from [employer] regarding your visit today. Please complete the survey to enable us to provide the highest quality of care to you and your family. If you cannot score us a very good on any question, please call the office to discuss how we could have made your experience a very good one.      Thank you,    Donal Brannon, APRN-EDSON Wesley, APRN-CNP  FLORENCIO Nazario, MODESTO Martinez, MODESTO Hayden, CMA  Wilma, ANITA Judge, PM

## 2023-12-27 ENCOUNTER — OFFICE VISIT (OUTPATIENT)
Dept: PRIMARY CARE CLINIC | Age: 43
End: 2023-12-27
Payer: COMMERCIAL

## 2023-12-27 VITALS
SYSTOLIC BLOOD PRESSURE: 126 MMHG | WEIGHT: 208.8 LBS | OXYGEN SATURATION: 98 % | RESPIRATION RATE: 16 BRPM | BODY MASS INDEX: 27.55 KG/M2 | HEART RATE: 98 BPM | DIASTOLIC BLOOD PRESSURE: 82 MMHG | TEMPERATURE: 97.1 F

## 2023-12-27 DIAGNOSIS — F41.9 ANXIETY: ICD-10-CM

## 2023-12-27 DIAGNOSIS — F34.1 DYSTHYMIA: ICD-10-CM

## 2023-12-27 DIAGNOSIS — J01.40 ACUTE NON-RECURRENT PANSINUSITIS: Primary | ICD-10-CM

## 2023-12-27 PROCEDURE — 99214 OFFICE O/P EST MOD 30 MIN: CPT | Performed by: NURSE PRACTITIONER

## 2023-12-27 RX ORDER — AMOXICILLIN AND CLAVULANATE POTASSIUM 875; 125 MG/1; MG/1
1 TABLET, FILM COATED ORAL 2 TIMES DAILY
Qty: 20 TABLET | Refills: 0 | Status: SHIPPED | OUTPATIENT
Start: 2023-12-27 | End: 2024-01-06

## 2023-12-27 RX ORDER — DESVENLAFAXINE SUCCINATE 50 MG/1
50 TABLET, EXTENDED RELEASE ORAL DAILY
Qty: 90 TABLET | Refills: 0 | Status: SHIPPED | OUTPATIENT
Start: 2023-12-27

## 2023-12-27 RX ORDER — PREDNISONE 20 MG/1
40 TABLET ORAL DAILY
Qty: 10 TABLET | Refills: 0 | Status: SHIPPED | OUTPATIENT
Start: 2023-12-27 | End: 2024-01-01

## 2023-12-27 NOTE — PATIENT INSTRUCTIONS
SURVEY:     You may be receiving a survey from [employer] regarding your visit today. Please complete the survey to enable us to provide the highest quality of care to you and your family. If you cannot score us a very good on any question, please call the office to discuss how we could have made your experience a very good one.      Thank you,    Carmin Gitelman Might, APRN-CNP  Cristhian Orourke, APRN-CNP  Mara Byrd, AYAZN  Emily Foster, CMA  Dara Valentin, CMA  Jackelyn, CMA  Wilma, PCA  Nu, PM

## 2024-01-26 DIAGNOSIS — F41.9 ANXIETY: ICD-10-CM

## 2024-01-26 DIAGNOSIS — F34.1 DYSTHYMIA: ICD-10-CM

## 2024-01-26 RX ORDER — DESVENLAFAXINE SUCCINATE 50 MG/1
50 TABLET, EXTENDED RELEASE ORAL DAILY
Qty: 90 TABLET | Refills: 1 | Status: SHIPPED | OUTPATIENT
Start: 2024-01-26

## 2024-01-26 NOTE — TELEPHONE ENCOUNTER
Health Maintenance   Topic Date Due    Hepatitis B vaccine (1 of 3 - 3-dose series) 11/30/2024 (Originally 1980)    COVID-19 Vaccine (3 - 2023-24 season) 11/30/2024 (Originally 9/1/2023)    Hepatitis C screen  11/30/2024 (Originally 1/11/1996)    Depression Monitoring  12/27/2024    Colorectal Cancer Screen  06/20/2026    Lipids  04/26/2027    DTaP/Tdap/Td vaccine (8 - Td or Tdap) 11/03/2033    Polio vaccine  Completed    Flu vaccine  Completed    HIV screen  Completed    Hepatitis A vaccine  Aged Out    Hib vaccine  Aged Out    HPV vaccine  Aged Out    Meningococcal (ACWY) vaccine  Aged Out    Pneumococcal 0-64 years Vaccine  Aged Out             (applicable per patient's age: Cancer Screenings, Depression Screening, Fall Risk Screening, Immunizations)    LDL Cholesterol (mg/dL)   Date Value   04/26/2022 86     AST (U/L)   Date Value   05/21/2020 17     ALT (U/L)   Date Value   05/21/2020 14     BUN (mg/dL)   Date Value   02/17/2022 12      (goal A1C is < 7)   (goal LDL is <100) need 30-50% reduction from baseline     BP Readings from Last 3 Encounters:   12/27/23 126/82   11/30/23 122/74   11/03/23 120/76    (goal /80)      All Future Testing planned in [facility]:  Lab Frequency Next Occurrence       Next Visit Date:  Future Appointments   Date Time Provider Department Center   5/30/2024  8:00 AM Prashant Brannon, APRN - CNP [facility]            Patient Active Problem List:     Anxiety     Dysthymia     Biliary dyskinesia     Post-cholecystectomy syndrome

## 2024-03-13 ENCOUNTER — APPOINTMENT (OUTPATIENT)
Dept: CT IMAGING | Age: 44
DRG: 394 | End: 2024-03-13
Payer: COMMERCIAL

## 2024-03-13 ENCOUNTER — HOSPITAL ENCOUNTER (INPATIENT)
Age: 44
LOS: 2 days | Discharge: HOME | DRG: 394 | End: 2024-03-15
Attending: STUDENT IN AN ORGANIZED HEALTH CARE EDUCATION/TRAINING PROGRAM | Admitting: STUDENT IN AN ORGANIZED HEALTH CARE EDUCATION/TRAINING PROGRAM
Payer: COMMERCIAL

## 2024-03-13 DIAGNOSIS — K92.2 GASTROINTESTINAL HEMORRHAGE, UNSPECIFIED GASTROINTESTINAL HEMORRHAGE TYPE: Primary | ICD-10-CM

## 2024-03-13 PROBLEM — K62.5 RECTAL BLEEDING: Status: ACTIVE | Noted: 2024-03-13

## 2024-03-13 PROBLEM — K35.80 ACUTE APPENDICITIS: Status: ACTIVE | Noted: 2024-03-13

## 2024-03-13 LAB
ABO + RH BLD: NORMAL
ALBUMIN SERPL-MCNC: 4 G/DL (ref 3.5–5.2)
ALBUMIN/GLOB SERPL: 1.7 {RATIO} (ref 1–2.5)
ALP SERPL-CCNC: 51 U/L (ref 40–129)
ALT SERPL-CCNC: 14 U/L (ref 5–41)
ANION GAP SERPL CALCULATED.3IONS-SCNC: 11 MMOL/L (ref 9–17)
ARM BAND NUMBER: NORMAL
AST SERPL-CCNC: 16 U/L
BASOPHILS # BLD: 0.03 K/UL (ref 0–0.2)
BASOPHILS NFR BLD: 0 % (ref 0–2)
BILIRUB SERPL-MCNC: 0.4 MG/DL (ref 0.3–1.2)
BLOOD BANK SAMPLE EXPIRATION: NORMAL
BLOOD GROUP ANTIBODIES SERPL: NEGATIVE
BUN SERPL-MCNC: 28 MG/DL (ref 6–20)
BUN/CREAT SERPL: 31 (ref 9–20)
CALCIUM SERPL-MCNC: 8.4 MG/DL (ref 8.6–10.4)
CHLORIDE SERPL-SCNC: 104 MMOL/L (ref 98–107)
CO2 SERPL-SCNC: 24 MMOL/L (ref 20–31)
CREAT SERPL-MCNC: 0.9 MG/DL (ref 0.7–1.2)
EOSINOPHIL # BLD: 0.05 K/UL (ref 0–0.44)
EOSINOPHILS RELATIVE PERCENT: 1 % (ref 1–4)
ERYTHROCYTE [DISTWIDTH] IN BLOOD BY AUTOMATED COUNT: 12.2 % (ref 11.8–14.4)
GFR SERPL CREATININE-BSD FRML MDRD: >60 ML/MIN/1.73M2
GLUCOSE SERPL-MCNC: 154 MG/DL (ref 70–99)
HCT VFR BLD AUTO: 31.5 % (ref 40.7–50.3)
HGB BLD-MCNC: 10.7 G/DL (ref 13–17)
IMM GRANULOCYTES # BLD AUTO: 0.03 K/UL (ref 0–0.3)
IMM GRANULOCYTES NFR BLD: 0 %
INR PPP: 1.1
LYMPHOCYTES NFR BLD: 2.85 K/UL (ref 1.1–3.7)
LYMPHOCYTES RELATIVE PERCENT: 26 % (ref 24–43)
MCH RBC QN AUTO: 30.9 PG (ref 25.2–33.5)
MCHC RBC AUTO-ENTMCNC: 34 G/DL (ref 28.4–34.8)
MCV RBC AUTO: 91 FL (ref 82.6–102.9)
MONOCYTES NFR BLD: 0.74 K/UL (ref 0.1–1.2)
MONOCYTES NFR BLD: 7 % (ref 3–12)
NEUTROPHILS NFR BLD: 66 % (ref 36–65)
NEUTS SEG NFR BLD: 7.41 K/UL (ref 1.5–8.1)
NRBC BLD-RTO: 0 PER 100 WBC
PLATELET # BLD AUTO: 259 K/UL (ref 138–453)
PMV BLD AUTO: 11.7 FL (ref 8.1–13.5)
POTASSIUM SERPL-SCNC: 3.8 MMOL/L (ref 3.7–5.3)
PROT SERPL-MCNC: 6.4 G/DL (ref 6.4–8.3)
PROTHROMBIN TIME: 14.6 SEC (ref 11.9–14.8)
RBC # BLD AUTO: 3.46 M/UL (ref 4.21–5.77)
SODIUM SERPL-SCNC: 139 MMOL/L (ref 135–144)
WBC OTHER # BLD: 11.1 K/UL (ref 3.5–11.3)

## 2024-03-13 PROCEDURE — 99285 EMERGENCY DEPT VISIT HI MDM: CPT

## 2024-03-13 PROCEDURE — 2580000003 HC RX 258

## 2024-03-13 PROCEDURE — 1200000000 HC SEMI PRIVATE

## 2024-03-13 PROCEDURE — 74177 CT ABD & PELVIS W/CONTRAST: CPT

## 2024-03-13 PROCEDURE — 99222 1ST HOSP IP/OBS MODERATE 55: CPT | Performed by: SURGERY

## 2024-03-13 PROCEDURE — 86900 BLOOD TYPING SEROLOGIC ABO: CPT

## 2024-03-13 PROCEDURE — 86850 RBC ANTIBODY SCREEN: CPT

## 2024-03-13 PROCEDURE — 96375 TX/PRO/DX INJ NEW DRUG ADDON: CPT

## 2024-03-13 PROCEDURE — A4216 STERILE WATER/SALINE, 10 ML: HCPCS | Performed by: PHYSICIAN ASSISTANT

## 2024-03-13 PROCEDURE — 86901 BLOOD TYPING SEROLOGIC RH(D): CPT

## 2024-03-13 PROCEDURE — 2580000003 HC RX 258: Performed by: PHYSICIAN ASSISTANT

## 2024-03-13 PROCEDURE — 6360000002 HC RX W HCPCS: Performed by: PHYSICIAN ASSISTANT

## 2024-03-13 PROCEDURE — 85025 COMPLETE CBC W/AUTO DIFF WBC: CPT

## 2024-03-13 PROCEDURE — 96374 THER/PROPH/DIAG INJ IV PUSH: CPT

## 2024-03-13 PROCEDURE — 80053 COMPREHEN METABOLIC PANEL: CPT

## 2024-03-13 PROCEDURE — 94761 N-INVAS EAR/PLS OXIMETRY MLT: CPT

## 2024-03-13 PROCEDURE — 6360000004 HC RX CONTRAST MEDICATION: Performed by: PHYSICIAN ASSISTANT

## 2024-03-13 PROCEDURE — C9113 INJ PANTOPRAZOLE SODIUM, VIA: HCPCS | Performed by: PHYSICIAN ASSISTANT

## 2024-03-13 PROCEDURE — 85610 PROTHROMBIN TIME: CPT

## 2024-03-13 RX ORDER — SODIUM CHLORIDE 0.9 % (FLUSH) 0.9 %
3 SYRINGE (ML) INJECTION EVERY 8 HOURS
Status: DISCONTINUED | OUTPATIENT
Start: 2024-03-13 | End: 2024-03-14

## 2024-03-13 RX ORDER — ACETAMINOPHEN 325 MG/1
650 TABLET ORAL EVERY 6 HOURS PRN
Status: DISCONTINUED | OUTPATIENT
Start: 2024-03-13 | End: 2024-03-15 | Stop reason: HOSPADM

## 2024-03-13 RX ORDER — SODIUM CHLORIDE 9 MG/ML
INJECTION, SOLUTION INTRAVENOUS CONTINUOUS
Status: DISCONTINUED | OUTPATIENT
Start: 2024-03-13 | End: 2024-03-15 | Stop reason: HOSPADM

## 2024-03-13 RX ORDER — SODIUM CHLORIDE 0.9 % (FLUSH) 0.9 %
5-40 SYRINGE (ML) INJECTION PRN
Status: DISCONTINUED | OUTPATIENT
Start: 2024-03-13 | End: 2024-03-15 | Stop reason: HOSPADM

## 2024-03-13 RX ORDER — OMEPRAZOLE 20 MG/1
20 CAPSULE, DELAYED RELEASE ORAL DAILY
COMMUNITY

## 2024-03-13 RX ORDER — ONDANSETRON 4 MG/1
4 TABLET, ORALLY DISINTEGRATING ORAL EVERY 8 HOURS PRN
Status: DISCONTINUED | OUTPATIENT
Start: 2024-03-13 | End: 2024-03-15 | Stop reason: HOSPADM

## 2024-03-13 RX ORDER — ACETAMINOPHEN 650 MG/1
650 SUPPOSITORY RECTAL EVERY 6 HOURS PRN
Status: DISCONTINUED | OUTPATIENT
Start: 2024-03-13 | End: 2024-03-15 | Stop reason: HOSPADM

## 2024-03-13 RX ORDER — ONDANSETRON 2 MG/ML
4 INJECTION INTRAMUSCULAR; INTRAVENOUS EVERY 6 HOURS PRN
Status: DISCONTINUED | OUTPATIENT
Start: 2024-03-13 | End: 2024-03-15 | Stop reason: HOSPADM

## 2024-03-13 RX ORDER — SODIUM CHLORIDE 0.9 % (FLUSH) 0.9 %
5-40 SYRINGE (ML) INJECTION EVERY 12 HOURS SCHEDULED
Status: DISCONTINUED | OUTPATIENT
Start: 2024-03-13 | End: 2024-03-15 | Stop reason: HOSPADM

## 2024-03-13 RX ORDER — ONDANSETRON 2 MG/ML
4 INJECTION INTRAMUSCULAR; INTRAVENOUS ONCE
Status: COMPLETED | OUTPATIENT
Start: 2024-03-13 | End: 2024-03-13

## 2024-03-13 RX ORDER — 0.9 % SODIUM CHLORIDE 0.9 %
1000 INTRAVENOUS SOLUTION INTRAVENOUS ONCE
Status: COMPLETED | OUTPATIENT
Start: 2024-03-13 | End: 2024-03-13

## 2024-03-13 RX ORDER — SODIUM CHLORIDE 9 MG/ML
INJECTION, SOLUTION INTRAVENOUS PRN
Status: DISCONTINUED | OUTPATIENT
Start: 2024-03-13 | End: 2024-03-15 | Stop reason: HOSPADM

## 2024-03-13 RX ADMIN — PANTOPRAZOLE SODIUM 80 MG: 40 INJECTION, POWDER, FOR SOLUTION INTRAVENOUS at 20:35

## 2024-03-13 RX ADMIN — SODIUM CHLORIDE 1000 ML: 9 INJECTION, SOLUTION INTRAVENOUS at 21:02

## 2024-03-13 RX ADMIN — ONDANSETRON 4 MG: 2 INJECTION INTRAMUSCULAR; INTRAVENOUS at 20:22

## 2024-03-13 RX ADMIN — SODIUM CHLORIDE: 9 INJECTION, SOLUTION INTRAVENOUS at 23:54

## 2024-03-13 RX ADMIN — IOPAMIDOL 75 ML: 755 INJECTION, SOLUTION INTRAVENOUS at 20:58

## 2024-03-13 RX ADMIN — SODIUM CHLORIDE 1000 ML: 9 INJECTION, SOLUTION INTRAVENOUS at 20:21

## 2024-03-13 RX ADMIN — PIPERACILLIN AND TAZOBACTAM 3375 MG: 3; .375 INJECTION, POWDER, FOR SOLUTION INTRAVENOUS at 22:05

## 2024-03-13 ASSESSMENT — PAIN SCALES - GENERAL
PAINLEVEL_OUTOF10: 2
PAINLEVEL_OUTOF10: 0

## 2024-03-13 ASSESSMENT — PAIN - FUNCTIONAL ASSESSMENT: PAIN_FUNCTIONAL_ASSESSMENT: 0-10

## 2024-03-13 ASSESSMENT — ENCOUNTER SYMPTOMS
SHORTNESS OF BREATH: 0
COUGH: 0
NAUSEA: 1
ABDOMINAL PAIN: 1
BLOOD IN STOOL: 1
DIARRHEA: 1
ANAL BLEEDING: 1

## 2024-03-13 ASSESSMENT — LIFESTYLE VARIABLES
HOW OFTEN DO YOU HAVE A DRINK CONTAINING ALCOHOL: NEVER
HOW MANY STANDARD DRINKS CONTAINING ALCOHOL DO YOU HAVE ON A TYPICAL DAY: PATIENT DOES NOT DRINK

## 2024-03-13 ASSESSMENT — PAIN DESCRIPTION - DESCRIPTORS: DESCRIPTORS: ACHING

## 2024-03-13 ASSESSMENT — PAIN DESCRIPTION - LOCATION: LOCATION: ABDOMEN

## 2024-03-14 ENCOUNTER — ANESTHESIA EVENT (OUTPATIENT)
Dept: OPERATING ROOM | Age: 44
End: 2024-03-14
Payer: COMMERCIAL

## 2024-03-14 LAB
ALBUMIN SERPL-MCNC: 3.5 G/DL (ref 3.5–5.2)
ALBUMIN/GLOB SERPL: 1.8 {RATIO} (ref 1–2.5)
ALP SERPL-CCNC: 43 U/L (ref 40–129)
ALT SERPL-CCNC: 12 U/L (ref 5–41)
ANION GAP SERPL CALCULATED.3IONS-SCNC: 8 MMOL/L (ref 9–17)
AST SERPL-CCNC: 12 U/L
BASOPHILS # BLD: 0.03 K/UL (ref 0–0.2)
BASOPHILS NFR BLD: 0 % (ref 0–2)
BILIRUB SERPL-MCNC: 0.5 MG/DL (ref 0.3–1.2)
BUN SERPL-MCNC: 22 MG/DL (ref 6–20)
BUN/CREAT SERPL: 28 (ref 9–20)
CALCIUM SERPL-MCNC: 8 MG/DL (ref 8.6–10.4)
CHLORIDE SERPL-SCNC: 107 MMOL/L (ref 98–107)
CO2 SERPL-SCNC: 23 MMOL/L (ref 20–31)
CREAT SERPL-MCNC: 0.8 MG/DL (ref 0.7–1.2)
DATE, STOOL #1: ABNORMAL
EOSINOPHIL # BLD: 0.04 K/UL (ref 0–0.44)
EOSINOPHILS RELATIVE PERCENT: 0 % (ref 1–4)
ERYTHROCYTE [DISTWIDTH] IN BLOOD BY AUTOMATED COUNT: 12.2 % (ref 11.8–14.4)
GFR SERPL CREATININE-BSD FRML MDRD: >60 ML/MIN/1.73M2
GLUCOSE SERPL-MCNC: 93 MG/DL (ref 70–99)
HCT VFR BLD AUTO: 25.1 % (ref 40.7–50.3)
HCT VFR BLD AUTO: 25.2 % (ref 40.7–50.3)
HCT VFR BLD AUTO: 25.2 % (ref 40.7–50.3)
HCT VFR BLD AUTO: 25.6 % (ref 40.7–50.3)
HEMOCCULT SP1 STL QL: POSITIVE
HGB BLD-MCNC: 8.7 G/DL (ref 13–17)
HGB BLD-MCNC: 8.7 G/DL (ref 13–17)
HGB BLD-MCNC: 8.8 G/DL (ref 13–17)
HGB BLD-MCNC: 8.9 G/DL (ref 13–17)
IMM GRANULOCYTES # BLD AUTO: 0.04 K/UL (ref 0–0.3)
IMM GRANULOCYTES NFR BLD: 0 %
IRON SATN MFR SERPL: 36 % (ref 20–55)
IRON SERPL-MCNC: 79 UG/DL (ref 61–157)
LYMPHOCYTES NFR BLD: 2.4 K/UL (ref 1.1–3.7)
LYMPHOCYTES RELATIVE PERCENT: 25 % (ref 24–43)
MCH RBC QN AUTO: 31.4 PG (ref 25.2–33.5)
MCHC RBC AUTO-ENTMCNC: 34.5 G/DL (ref 28.4–34.8)
MCV RBC AUTO: 91 FL (ref 82.6–102.9)
MONOCYTES NFR BLD: 0.71 K/UL (ref 0.1–1.2)
MONOCYTES NFR BLD: 7 % (ref 3–12)
NEUTROPHILS NFR BLD: 68 % (ref 36–65)
NEUTS SEG NFR BLD: 6.59 K/UL (ref 1.5–8.1)
NRBC BLD-RTO: 0 PER 100 WBC
PLATELET # BLD AUTO: 197 K/UL (ref 138–453)
PMV BLD AUTO: 12.4 FL (ref 8.1–13.5)
POTASSIUM SERPL-SCNC: 3.6 MMOL/L (ref 3.7–5.3)
PROT SERPL-MCNC: 5.4 G/DL (ref 6.4–8.3)
RBC # BLD AUTO: 2.77 M/UL (ref 4.21–5.77)
SODIUM SERPL-SCNC: 138 MMOL/L (ref 135–144)
TIBC SERPL-MCNC: 219 UG/DL (ref 250–450)
TIME, STOOL #1: 832
UNSATURATED IRON BINDING CAPACITY: 140 UG/DL (ref 112–347)
WBC OTHER # BLD: 9.8 K/UL (ref 3.5–11.3)

## 2024-03-14 PROCEDURE — 85025 COMPLETE CBC W/AUTO DIFF WBC: CPT

## 2024-03-14 PROCEDURE — 6360000002 HC RX W HCPCS

## 2024-03-14 PROCEDURE — 6370000000 HC RX 637 (ALT 250 FOR IP)

## 2024-03-14 PROCEDURE — 85014 HEMATOCRIT: CPT

## 2024-03-14 PROCEDURE — 82272 OCCULT BLD FECES 1-3 TESTS: CPT

## 2024-03-14 PROCEDURE — 6370000000 HC RX 637 (ALT 250 FOR IP): Performed by: NURSE PRACTITIONER

## 2024-03-14 PROCEDURE — 2580000003 HC RX 258

## 2024-03-14 PROCEDURE — C9113 INJ PANTOPRAZOLE SODIUM, VIA: HCPCS

## 2024-03-14 PROCEDURE — A4216 STERILE WATER/SALINE, 10 ML: HCPCS

## 2024-03-14 PROCEDURE — 80053 COMPREHEN METABOLIC PANEL: CPT

## 2024-03-14 PROCEDURE — 94761 N-INVAS EAR/PLS OXIMETRY MLT: CPT

## 2024-03-14 PROCEDURE — 83550 IRON BINDING TEST: CPT

## 2024-03-14 PROCEDURE — 6370000000 HC RX 637 (ALT 250 FOR IP): Performed by: INTERNAL MEDICINE

## 2024-03-14 PROCEDURE — 85018 HEMOGLOBIN: CPT

## 2024-03-14 PROCEDURE — 1200000000 HC SEMI PRIVATE

## 2024-03-14 PROCEDURE — 36415 COLL VENOUS BLD VENIPUNCTURE: CPT

## 2024-03-14 PROCEDURE — 83540 ASSAY OF IRON: CPT

## 2024-03-14 RX ORDER — POTASSIUM CHLORIDE 20 MEQ/1
40 TABLET, EXTENDED RELEASE ORAL ONCE
Status: COMPLETED | OUTPATIENT
Start: 2024-03-14 | End: 2024-03-14

## 2024-03-14 RX ORDER — FEXOFENADINE HCL 180 MG/1
180 TABLET ORAL DAILY
COMMUNITY

## 2024-03-14 RX ORDER — TRAZODONE HYDROCHLORIDE 50 MG/1
100 TABLET ORAL NIGHTLY PRN
Status: DISCONTINUED | OUTPATIENT
Start: 2024-03-14 | End: 2024-03-15 | Stop reason: HOSPADM

## 2024-03-14 RX ORDER — LORAZEPAM 0.5 MG/1
0.5 TABLET ORAL NIGHTLY PRN
Status: DISCONTINUED | OUTPATIENT
Start: 2024-03-14 | End: 2024-03-15 | Stop reason: HOSPADM

## 2024-03-14 RX ADMIN — PIPERACILLIN AND TAZOBACTAM 3375 MG: 3; .375 INJECTION, POWDER, FOR SOLUTION INTRAVENOUS at 05:42

## 2024-03-14 RX ADMIN — PIPERACILLIN AND TAZOBACTAM 3375 MG: 3; .375 INJECTION, POWDER, FOR SOLUTION INTRAVENOUS at 13:14

## 2024-03-14 RX ADMIN — POLYETHYLENE GLYCOL-3350 AND ELECTROLYTES 4000 ML: 236; 6.74; 5.86; 2.97; 22.74 POWDER, FOR SOLUTION ORAL at 08:16

## 2024-03-14 RX ADMIN — POTASSIUM CHLORIDE 40 MEQ: 1500 TABLET, EXTENDED RELEASE ORAL at 07:35

## 2024-03-14 RX ADMIN — LORAZEPAM 0.5 MG: 0.5 TABLET ORAL at 00:36

## 2024-03-14 RX ADMIN — TRAZODONE HYDROCHLORIDE 100 MG: 50 TABLET ORAL at 21:09

## 2024-03-14 RX ADMIN — PANTOPRAZOLE SODIUM 40 MG: 40 INJECTION, POWDER, FOR SOLUTION INTRAVENOUS at 11:36

## 2024-03-14 RX ADMIN — SODIUM CHLORIDE: 9 INJECTION, SOLUTION INTRAVENOUS at 08:33

## 2024-03-14 RX ADMIN — SODIUM CHLORIDE: 9 INJECTION, SOLUTION INTRAVENOUS at 17:14

## 2024-03-14 RX ADMIN — PIPERACILLIN AND TAZOBACTAM 3375 MG: 3; .375 INJECTION, POWDER, FOR SOLUTION INTRAVENOUS at 21:18

## 2024-03-14 RX ADMIN — PANTOPRAZOLE SODIUM 40 MG: 40 INJECTION, POWDER, FOR SOLUTION INTRAVENOUS at 00:13

## 2024-03-14 ASSESSMENT — PAIN SCALES - GENERAL
PAINLEVEL_OUTOF10: 0
PAINLEVEL_OUTOF10: 0

## 2024-03-14 NOTE — ED PROVIDER NOTES
[facility]  EMERGENCY DEPARTMENT ENCOUNTER      Pt Name: Damien Lopes  MRN: [id number]  Birthdate 1980  Date of evaluation: 3/13/2024  Provider: Laurel Warren PA-C    CHIEF COMPLAINT       Chief Complaint   Patient presents with    Rectal Bleeding     Starting 3 days ago. Dizziness & lightheadedness starting today.          HISTORY OF PRESENT ILLNESS      Damien Lopes is a 44 y.o. male who presents to the emergency department with bright red blood per rectum.  Patient started rectal bleeding 3 days ago.  Initially it was bright red blood.  Then the blood turned around.  He then started to pass dark tarry stool.  Most recently he has been bleeding bright red blood again.  He appeared pale and was dizzy and lightheaded today and this prompted him to come to the emergency department.  The patient has some generalized abdominal cramping but no focal pain.  He has no history of this in the past.        REVIEW OF SYSTEMS       Review of Systems   Constitutional:  Negative for fever.   Respiratory:  Negative for cough and shortness of breath.    Cardiovascular:  Negative for chest pain.   Gastrointestinal:  Positive for abdominal pain, anal bleeding, blood in stool, diarrhea and nausea.         PAST MEDICAL HISTORY     Past Medical History:   Diagnosis Date    Anxiety     Headache          SURGICAL HISTORY       Past Surgical History:   Procedure Laterality Date    ANKLE FRACTURE SURGERY  2008    right trimalleolar fracture ORIF - Dr. Casarez    CHOLECYSTECTOMY      CHOLECYSTECTOMY, LAPAROSCOPIC N/A 07/08/2020    CHOLECYSTECTOMY LAPAROSCOPIC ROBOTIC performed by Samuel Hanye MD at [facility] OR    FRACTURE SURGERY  2008    SHOULDER ARTHROSCOPY Right 05/26/2017    Dr. Casarez - right shoulder SLAP repair    TONSILLECTOMY  1984    VASECTOMY  12/24/2015    Dr. Knutson         CURRENT MEDICATIONS       Previous Medications    CETIRIZINE (ZYRTEC) 10 MG TABLET    Take 1 tablet by mouth daily as needed  Prescriptions    No medications on file       (Please note that portions of this note were completed with a voice recognition program.  Efforts were made to edit the dictations but occasionally words are mis-transcribed.)    Laurel Warren PA-C (electronically signed)  Attending Emergency Physician           Laurel Warren PA-C  03/13/24 [id number]

## 2024-03-14 NOTE — PROGRESS NOTES
Pt came up from ER to room 311 and is being admitted with rectal bleeding. Pt ambulated from the wheelchair to the bed without difficulties. Pt is A&O x4. Vitals and assessment as charted. Pt denies pain. Wife at bedside. Writer went over plan of care with the pt. All questions answered. Pt denies any further needs at this time. Call light within reach.

## 2024-03-14 NOTE — H&P
History and Physical    Patient:  Damien Lopes  MRN: [id number]    Chief Complaint: Rectal bleeding    History Obtained From:  patient, electronic medical record    PCP: Prashant Brannon APRN - CNP    History of Present Illness:   The patient is a 44 y.o. male who presents with rectal bleeding.  Past medical history includes anxiety and cholecystectomy.  Patient states he has been fatigued for the last few days.  His stools have been darker than normal as well.  Today when he went to the bathroom to have a bowel movement, he had bright red bleeding.  He states following this he passed out.  He woke up on the floor.  He denies any abdominal pain, fever, chills, or shortness of breath.  He has had some intermittent dizziness.  He states last week he had a headache and had taken both aspirin and ibuprofen, however he does not feel this was excessively.  In the emergency department this evening, he became hypotensive during his IV start. He was given 3 L of IV fluid.  Initial workup revealed RBC 3.46, hemoglobin 10.7, hematocrit 31.5.  No leukocytosis present.  CT abdomen pelvis suspicious for acute appendicitis.  General surgery was contacted in the ED with plan for antibiotics and colonoscopy on Friday.    Past Medical History:        Diagnosis Date    Anxiety     Headache        Past Surgical History:        Procedure Laterality Date    ANKLE FRACTURE SURGERY  2008    right trimalleolar fracture ORIF - Dr. Casarez    CHOLECYSTECTOMY      CHOLECYSTECTOMY, LAPAROSCOPIC N/A 07/08/2020    CHOLECYSTECTOMY LAPAROSCOPIC ROBOTIC performed by Samuel Haney MD at [facility] OR    FRACTURE SURGERY  2008    SHOULDER ARTHROSCOPY Right 05/26/2017    Dr. Casarez - right shoulder SLAP repair    TONSILLECTOMY  1984    VASECTOMY  12/24/2015    Dr. Knutson       Medications Prior to Admission:    Prior to Admission medications    Medication Sig Start Date End Date Taking? Authorizing Provider   desvenlafaxine succinate (PRISTIQ) 50 MG  over-the-counter products, herbals, cannabinoid products and bitamin/mineral/dietary/nutritional supplements.  [If 'yes\", STOP HERE]     []  The patient is not eligible for medication reconciliation; the patient is in an emergent medical situation where delaying treatment would jeopardize the patient's health    []  I did NOT confirm, update or review the patient's current list of medications today.  [DOES NOT SATISFY [employer] PERFORMANCE]        [facility] Advanced Care Planning documentation:  [x] I have confirmed that the patient's Advance Care Plan is present, Code Status is documented, or surrogate decision maker is listed in the patient's medical record  [If \"yes\", STOP HERE]     [] The patient's Advance Care Plan is NOT present because:    []  I confirmed today that the patient does not wish or was not able to name a   surrogate decision maker or provide and advance care plan.    [] Hospice care is currently being provided or has been provided within the   calendar year.    []  I did NOT confirm today the presence of an Advance Care Plan or surrogate   decision maker documented within the patient's medical record.   [DOES NOT SATISFY MIPS PERFORMANCE]    CORE MEASURES  DVT prophylaxis: NO chemical ppx due to active bleed  Decubitus ulcer present on admission: No  CODE STATUS: FULL CODE  Nutrition Status: good   Physical therapy: NA   Old Charts reviewed: Yes  EKG Reviewed: N/A  Advance Directive Addressed: Yes    PAOLA Devine - CNP, PAOLA, NP-C  3/13/2024, 10:53 PM

## 2024-03-14 NOTE — ED NOTES
Patient blood pressure noted to drop and patient became dizzy. Provider notified and 1000ml bolus ordered to be pressure bagged in. Patient Placed in trendelenburg position and cool rag placed on forehead. Second 20G established at this time.

## 2024-03-14 NOTE — PROGRESS NOTES
Progress Note    SUBJECTIVE:    Patient seen for f/u of Rectal bleeding.  He resting in bed no distress. Feels better. No further blood or stools since arrival to ER.  Stated when he was having them it was \"bright red\".  No n/v.  No fever.  Abdominal is \"a little grumbly but no pain\".      ROS:   Constitutional: negative  for fevers, and negative for chills.  Respiratory: negative for shortness of breath, negative for cough, and negative for wheezing  Cardiovascular: negative for chest pain, and negative for palpitations  Gastrointestinal: negative for abdominal pain, negative for nausea,negative for vomiting, negative for diarrhea, and negative for constipation     All other systems were reviewed with the patient and are negative unless otherwise stated in HPI      OBJECTIVE:      Vitals:   Vitals:    03/13/24 2327   BP: 114/61   Pulse: 90   Resp: 16   Temp: 97.9 °F (36.6 °C)   SpO2: 95%     Weight - Scale: 92.5 kg (203 lb 14.8 oz)   Height: 185.4 cm (6' 1\")     Weight  Wt Readings from Last 3 Encounters:   03/13/24 92.5 kg (203 lb 14.8 oz)   12/27/23 94.7 kg (208 lb 12.8 oz)   11/30/23 94.2 kg (207 lb 11.2 oz)     Body mass index is 26.9 kg/m².    24HR INTAKE/OUTPUT:      Intake/Output Summary (Last 24 hours) at 3/14/2024 0702  Last data filed at 3/14/2024 0347  Gross per 24 hour   Intake --   Output 525 ml   Net -525 ml     -----------------------------------------------------------------  Exam:    GEN:    Awake, alert and oriented x3.   EYES:  EOMI, pupils equal   NECK: Supple. No lymphadenopathy.  No carotid bruit  CVS:    regular rate and rhythm, no audible murmur  PULM:  CTA, no wheezes, rales or rhonchi, no acute respiratory distress  ABD:    Bowels sounds normal.  Abdomen is soft.  No distention.  no tenderness to palpation.   EXT:   no edema bilaterally .  No calf tenderness.   NEURO: Moves all extremities.  Motor and sensory are grossly intact  SKIN:  No rashes.  No skin lesions.     -----------------------------------------------------------------    Diagnostic Data:      Complete Blood Count:   Recent Labs     03/13/24 2013 03/14/24  0340 03/14/24  0533   WBC 11.1  --  9.8   RBC 3.46*  --  2.77*   HGB 10.7* 8.7* 8.7*   HCT 31.5* 25.1* 25.2*   MCV 91.0  --  91.0   MCH 30.9  --  31.4   MCHC 34.0  --  34.5   RDW 12.2  --  12.2     --  197   MPV 11.7  --  12.4        Last 3 Blood Glucose:   Recent Labs     03/13/24 2013 03/14/24  0533   GLUCOSE 154* 93        Comprehensive Metabolic Profile:   Recent Labs     03/13/24 2013 03/14/24  0533    138   K 3.8 3.6*    107   CO2 24 23   BUN 28* 22*   CREATININE 0.9 0.8   GLUCOSE 154* 93   CALCIUM 8.4* 8.0*   PROT 6.4 5.4*   LABALBU 4.0 3.5   BILITOT 0.4 0.5   ALKPHOS 51 43   AST 16 12   ALT 14 12        Urinalysis: No results found for: \"NITRU\", \"COLORU\", \"PHUR\", \"LABCAST\", \"WBCUA\", \"RBCUA\", \"MUCUS\", \"TRICHOMONAS\", \"YEAST\", \"BACTERIA\", \"CLARITYU\", \"SPECGRAV\", \"LEUKOCYTESUR\", \"UROBILINOGEN\", \"BILIRUBINUR\", \"BLOODU\", \"GLUCOSEU\", \"KETUA\", \"AMORPHOUS\"    HgBA1c:  No results found for: \"LABA1C\"    Lactic Acid: No results found for: \"LACTA\"     Troponin: No results for input(s): \"TROPONINI\" in the last 72 hours.    CRP:  No results for input(s): \"CRP\" in the last 72 hours.      Radiology/Imaging:  CT ABDOMEN PELVIS W IV CONTRAST Additional Contrast? None   Final Result   Findings are suspicious for acute appendicitis. No evidence of perforation or   abscess formation.  Please correlate with laboratory parameters and with   follow-up imaging if necessary.               ASSESSMENT / PLAN:    MEDICAL DECISION MAKING:    Primary Problem(s): Rectal bleeding  Differential diagnoses: Anemia, diverticulitis, inflammatory bowel disease, ischemic colitis, malignancy  Condition is an undiagnosed new problem with uncertain prognosis  Condition is stable  Treatment plan:   H&H every 6 hours  CBC, CMP, iron, TIBC, ferritin  Consult general  surgery  N.p.o. after midnight  Clear liquid diet today  Stool for occult blood-positive  Telemetry monitoring  Imaging: CT abdomen concerning for acute appendicitis  Medications:   IV Protonix  IV fluids  Zofran as needed  Golytely today  Medication Monitoring / High Risk Medications: none      Appendicitis-possibly all inflammatory   Condition is stable  Treatment plan:   Consult general surgery  CBC, CMP  Imaging: no further imaging studies ordered today  Medications:   IV Zosyn  Continue IVF     Nutrition status:   Well developed, well nourished with no malnutrition  Dietician consult initiated     Hospital Prophylaxis:   DVT: none due to GI bleeding    Stress Ulcer: PPI      Disposition:  Shared decision making: All test results, treatment options and disposition options were discussed with the patient today  Social determinants of health that may impact management: none  Code status: Full Code   Disposition: Discharge plan is pending    [facility] Advanced Care Planning documentation:  [x] I have confirmed that the patient's Advance Care Plan is present, Code Status is documented, or surrogate decision maker is listed in the patient's medical record  [If \"yes\", STOP HERE]     [] The patient's Advance Care Plan is NOT present because:    []  I confirmed today that the patient does not wish or was not able to name a   surrogate decision maker or provide and advance care plan.    [] Hospice care is currently being provided or has been provided within the   calendar year.    []  I did NOT confirm today the presence of an Advance Care Plan or surrogate   decision maker documented within the patient's medical record.   [DOES NOT SATISFY [facility] PERFORMANCE]    Liyah Soler, PAOLA - CNP , APRN, NP-C  [facility]        3/14/2024, 7:02 AM

## 2024-03-14 NOTE — CARE COORDINATION
Case Management Assessment  Initial Evaluation    Date/Time of Evaluation: 3/14/2024 9:15 AM  Assessment Completed by: MIRI Caballero    If patient is discharged prior to next notation, then this note serves as note for discharge by case management.    Patient Name: Damien Lopes                   YOB: 1980  Diagnosis: Rectal bleeding [K62.5]  Gastrointestinal hemorrhage, unspecified gastrointestinal hemorrhage type [K92.2]                   Date / Time: 3/13/2024  8:02 PM    Patient Admission Status: Inpatient   Readmission Risk (Low < 19, Mod (19-27), High > 27): Readmission Risk Score: 7.8    Current PCP: Prashant Brannon APRN - CNP  PCP verified by CM? Yes    Chart Reviewed: Yes      History Provided by: Patient  Patient Orientation: Alert and Oriented, Person, Place, Situation, Self    Patient Cognition: Alert    Hospitalization in the last 30 days (Readmission):  No    If yes, Readmission Assessment in CM Navigator will be completed.    Advance Directives:      Code Status: Full Code   Patient's Primary Decision Maker is: Legal Next of Kin    Primary Decision Maker (Active): Lulú Lopes - Spouse - [phone]    Discharge Planning:    Patient lives with: Spouse/Significant Other, Children Type of Home: House  Primary Care Giver: Self  Patient Support Systems include: Spouse/Significant Other, Parent, Children, Family Members   Current Financial resources: Other (Comment) ([employer])  Current community resources: None  Current services prior to admission: None            Current DME:              Type of [facility] Care services:  [facility]    AD[facility]  Prior functional level: Independent in ADLs/IADLs  Current functional level: Independent in ADLs/IADLs    PT AM-PAC:   /24  OT AM-PAC:   /24    Family can provide assistance at DC: Yes  Would you like Case Management to discuss the discharge plan with any other family members/significant others, and if so, who? No  Plans to Return to Present Housing:  Yes  Other Identified Issues/Barriers to RETURNING to current housing: none  Potential Assistance needed at discharge: N/A            Potential DME:    Patient expects to discharge to: House  Plan for transportation at discharge: Self    Financial    Payor: [employer] / Plan: [employer] EM[employer]OYEES / Product Type: *No Product type* /     Does insurance require precert for SNF: Yes    Potential assistance Purchasing Medications: No  Meds-to-Beds request:  No      [facility] [place] [address] - P [phone] - F [phone]  [address] [address]  Phone: [phone] Fax: [phone]    [facility] [address] [place] [address] - [address] - P [phone] - F [phone]  [address]  [facility] [address]  Phone: [phone] Fax: [phone]    [facility] [address], [address] - P [phone] - F [phone]  [address], [address]  [place] [address]  Phone: [phone] Fax: [phone]      Notes:    Factors facilitating achievement of predicted outcomes: Family support, Motivated, Cooperative, and Pleasant    Barriers to discharge: none    Additional Case Management Notes: Discussed discharge plans with the patient.  Patient is  and lives at home with his wife & children.   He uses no medical equipment.  Both him and his wife share in the household chores.  He is independent with his ADL's.  Patient manages his medications and drives.  He has no outside services in the home.  The discharge plan is home with no services at this time.                                    The Plan for Transition of Care is related to the following treatment goals of Rectal bleeding [K62.5]  Gastrointestinal hemorrhage, unspecified gastrointestinal hemorrhage type [K92.2]    Transportation/Food Security/Housekeeping Addressed: No issues identified or concerns.    The Patient and/or Patient Representative  Agree with the Discharge Plan?  Yes     The discharge plan is home with no services at this time.                                    MIRI Caballero  Case Management Department  Ph: [phone]

## 2024-03-14 NOTE — PROGRESS NOTES
Shift assessment and vitals obtained at this time as charted. Vitals WNL, patient denies pain. Patient is alert and oriented x4. Lung sounds are clear throughout. Abdomen is soft and rounded with active bowel sounds throughout. Diarrhea and loss of appetite continue. Assessment otherwise as charted, see flowsheets. Patient is resting in the bed with call light in reach, denies other needs at this time. Care ongoing.

## 2024-03-14 NOTE — PLAN OF CARE
Problem: Pain  Goal: Verbalizes/displays adequate comfort level or baseline comfort level  Outcome: Progressing  Note: Pt is able to verbalize when in pain. Pt denies pain at this time. Will continue to monitor.      Problem: Hematologic - Adult  Goal: Maintains hematologic stability  Outcome: Progressing  Note: Monitor H/H every 6 hours.

## 2024-03-14 NOTE — ED NOTES
Patient walked to and from bathroom without incident or dizziness. Patient stated his bowel movement was only bright red blood.

## 2024-03-14 NOTE — PROGRESS NOTES
Comprehensive Nutrition Assessment    Type and Reason for Visit:  Initial    Nutrition Recommendations/Plan:   Follow up with GI findings.     Malnutrition Assessment:  Malnutrition Status:  No malnutrition (03/14/24 0829)    Context:  Acute Illness     Findings of the 6 clinical characteristics of malnutrition:  Energy Intake:  Mild decrease in energy intake (Comment) (acutely)  Weight Loss:  No significant weight loss     Body Fat Loss:  No significant body fat loss     Muscle Mass Loss:  No significant muscle mass loss    Fluid Accumulation:  No significant fluid accumulation     Strength:  Not Performed    Nutrition Assessment:    Inadequate nutrient intakes r/t altered GI status, AEB NPO / liquid diet for GI blood losses with pending scopes tomorrow. Normally good appetite and intakes with some food intolerances with IBS and post nubia. Taking prep and C.L. diet currently without questions or concerns. May benefit from diet education after scopes depending on findings.    Nutrition Related Findings:    well nourished appearing. no edema, + b/s. Wound Type: None       Current Nutrition Intake & Therapies:    Average Meal Intake: Unable to assess (no records and was NPO)  Average Supplements Intake: None Ordered  Diet NPO  ADULT DIET; Clear Liquid    Anthropometric Measures:  Height: 185.4 cm (6' 1\")  Ideal Body Weight (IBW): 184 lbs (84 kg)    Admission Body Weight: 92.5 kg (203 lb 14.8 oz)  Current Body Weight: 92.5 kg (203 lb 14.8 oz), 110.8 % IBW. Weight Source: Bed Scale  Current BMI (kg/m2): 26.9  Usual Body Weight: 94.7 kg (208 lb 12.8 oz) (in December)  % Weight Change (Calculated): -2.3  Weight Adjustment For: No Adjustment                 BMI Categories: Overweight (BMI 25.0-29.9)    Estimated Daily Nutrient Needs:  Energy Requirements Based On: Kcal/kg  Weight Used for Energy Requirements: Current  Energy (kcal/day): 3788-7674 (20-23)  Weight Used for Protein Requirements: Ideal  Protein (g/day):

## 2024-03-14 NOTE — PROGRESS NOTES
Spiritual Services Interventions  0311/0311-01   3/14/2024  Charlie Masters    Damien Knottwillard  44 y.o. year old male    Encounter Summary  Encounter Overview/Reason : (P) Initial Encounter  Service Provided For:: (P) Patient  Referral/Consult From:: (P) Rounding  Last Encounter : (P) 03/14/24  Complexity of Encounter: (P) Moderate  Begin Time: (P) 1500  End Time : (P) 1515  Total Time Calculated: (P) 15 min     Spiritual/Emotional needs  Type: (P) Spiritual Support                    Assessment/Intervention/Outcome  Assessment: (P) Calm  Intervention: (P) Discussed belief system/Restorationism practices/olga, Discussed illness injury and it’s impact  Outcome: (P) Encouraged, Engaged in conversation

## 2024-03-14 NOTE — PROGRESS NOTES
Anesthesiology consult called at this time to Antonella Santo. Ty Pace will be on the patients case.

## 2024-03-14 NOTE — CONSULTS
GENERAL SURGERY CONSULTATION- [facility]      Patient's Name/ Date of Birth/ Gender: Damien Lopes / 1980 (44 y.o.) / male     PCP: Prashant Brannon APRN - CNP  Referring: ER    History of present Illness:  Patient is a pleasant 44 y.o. male  rectaal bleeding, hematochezia and melena, passed out, hypotensive in ER responding to IV fluids, no transfusions since admission, see labs below. Healthy. No excessive NSAIDS. No fevers. Did bowel prep yesterday.     Past Medical History:  has a past medical history of Anxiety and Headache.    Past Surgical History:   Past Surgical History:   Procedure Laterality Date    ANKLE FRACTURE SURGERY  2008    right trimalleolar fracture ORIF - Dr. Casarez    CHOLECYSTECTOMY      CHOLECYSTECTOMY, LAPAROSCOPIC N/A 07/08/2020    CHOLECYSTECTOMY LAPAROSCOPIC ROBOTIC performed by Samuel Haney MD at [facility] OR    FRACTURE SURGERY  2008    SHOULDER ARTHROSCOPY Right 05/26/2017    Dr. Casarez - right shoulder SLAP repair    TONSILLECTOMY  1984    VASECTOMY  12/24/2015    Dr. Knutson       Social History:  reports that he has never smoked. He has never used smokeless tobacco. He reports current alcohol use of about 4.0 standard drinks of alcohol per week. He reports that he does not use drugs.    Family History: family history includes Diabetes in his paternal grandmother; Heart Disease in his paternal grandfather; High Blood Pressure in his father; High Cholesterol in his father; Hypertension in his paternal grandfather; Stroke in his paternal grandmother.    Review of Systems:   General: Completed and, except as mentioned above, was negative or noncontributory  Psychological:  Completed and, except as mentioned above, was negative or noncontributory  Ophthalmic:  Completed and, except as mentioned above, was negative or noncontributory  ENT:  Completed and, except as mentioned above, was negative or noncontributory  Allergy and Immunology:  Completed and, except as  IntraVENous, Q6H PRN, Conchita Brasher, APRN - CNP    [MAR Hold] acetaminophen (TYLENOL) tablet 650 mg, 650 mg, Oral, Q6H PRN **OR** [MAR Hold] acetaminophen (TYLENOL) suppository 650 mg, 650 mg, Rectal, Q6H PRN, Conchita Brasher, APRN - CNP    [MAR Hold] pantoprazole (PROTONIX) 40 mg in sodium chloride (PF) 0.9 % 10 mL injection, 40 mg, IntraVENous, Q12H, Conchita Brasher, APRN - CNP, 40 mg at 03/15/24 1121    [MAR Hold] piperacillin-tazobactam (ZOSYN) 3,375 mg in sodium chloride 0.9 % 50 mL IVPB ([id number]), 3,375 mg, IntraVENous, Q8H, Conchita Brahser, APRN - CNP, Stopped at 03/15/24 0922    Physical Exam:  Vital signs and Nurse's note reviewed. /73   Pulse 88   Temp 97.3 °F (36.3 °C) (Temporal)   Resp (!) 8   Ht 1.854 m (6' 1\")   Wt 93.7 kg (206 lb 8 oz)   SpO2 100%   BMI 27.24 kg/m²    height is 1.854 m (6' 1\") and weight is 93.7 kg (206 lb 8 oz). His temporal temperature is 97.3 °F (36.3 °C). His blood pressure is 115/73 and his pulse is 88. His respiration is 8 (abnormal) and oxygen saturation is 100%.   Gen:  A&Ox3, NAD. Pleasant and cooperative.  HEENT: PERRLA, EOMI, no scleral icterus  Neck:  no goiter  CVS: Regular rate and rhythm  Resp: Good bilateral air entry, no active wheezing, no labored breathing  Abd: soft, non-tender, non-distended, bowel sounds present rectal exam at scope  Ext: Moves all extremities, no gross focal motor deficits  Skin: No erythema or ulcerations     Labs:   Lab Results   Component Value Date/Time    WBC 6.0 03/15/2024 05:30 AM    HGB 8.2 03/15/2024 05:30 AM    HCT 24.2 03/15/2024 05:30 AM    MCV 92.0 03/15/2024 05:30 AM     03/15/2024 05:30 AM     Lab Results   Component Value Date/Time     03/15/2024 05:30 AM    K 3.9 03/15/2024 05:30 AM     03/15/2024 05:30 AM    CO2 24 03/15/2024 05:30 AM    BUN 10 03/15/2024 05:30 AM    CREATININE 0.8 03/15/2024 05:30 AM    GLUCOSE 88 03/15/2024 05:30 AM    CALCIUM 8.3 03/15/2024 05:30 AM     Lab Results   Component  soft tissue swelling is seen.     IMPRESSION:  Findings are suspicious for acute appendicitis. No evidence of perforation or  abscess formation.  Please correlate with laboratory parameters and with  follow-up imaging if necessary.       Impressions/Recommendations:     GIB, acute blood loss anemia. Hypotension. Plan EGD and colonoscopy. Serial H&H. Clinically not acute appendicitis as reported on CT. Informed consent obtained. Risks and benefits discussed. Further recommendations to follow pending findings.       CONCEPCIÓN Rizvi DO, MPH, FACOS, FACS  General Surgery, [facility], [facility] - [place] office [phone]  [place] office [phone]

## 2024-03-14 NOTE — PROGRESS NOTES
Reassessment and vitals obtained at this time as charted. Vitals WNL, patient denies pain. Patient remains alert and oriented x4. Lung sounds clear throughout. Abdomen is soft and rounded with active bowel sounds but bloody diarrhea and reduction in appetite continue. Assessment otherwise as charted, see flowsheets. Patient is resting in the bed with call light in reach, denies other needs at this time. Care ongoing.

## 2024-03-15 ENCOUNTER — ANESTHESIA (OUTPATIENT)
Dept: OPERATING ROOM | Age: 44
End: 2024-03-15
Payer: COMMERCIAL

## 2024-03-15 VITALS
HEART RATE: 78 BPM | WEIGHT: 206.5 LBS | BODY MASS INDEX: 27.37 KG/M2 | SYSTOLIC BLOOD PRESSURE: 116 MMHG | TEMPERATURE: 97.9 F | OXYGEN SATURATION: 99 % | RESPIRATION RATE: 16 BRPM | HEIGHT: 73 IN | DIASTOLIC BLOOD PRESSURE: 70 MMHG

## 2024-03-15 LAB
ALBUMIN SERPL-MCNC: 3.5 G/DL (ref 3.5–5.2)
ALBUMIN/GLOB SERPL: 1.9 {RATIO} (ref 1–2.5)
ALP SERPL-CCNC: 42 U/L (ref 40–129)
ALT SERPL-CCNC: 18 U/L (ref 5–41)
ANION GAP SERPL CALCULATED.3IONS-SCNC: 8 MMOL/L
AST SERPL-CCNC: 19 U/L
BASOPHILS # BLD: 0.04 K/UL (ref 0–0.2)
BASOPHILS NFR BLD: 1 % (ref 0–2)
BILIRUB SERPL-MCNC: 0.4 MG/DL (ref 0.3–1.2)
BUN SERPL-MCNC: 10 MG/DL (ref 6–20)
BUN/CREAT SERPL: 13 (ref 9–20)
CALCIUM SERPL-MCNC: 8.3 MG/DL (ref 8.6–10.4)
CHLORIDE SERPL-SCNC: 109 MMOL/L (ref 98–107)
CO2 SERPL-SCNC: 24 MMOL/L (ref 20–31)
CREAT SERPL-MCNC: 0.8 MG/DL (ref 0.7–1.2)
EOSINOPHIL # BLD: 0.1 K/UL (ref 0–0.44)
EOSINOPHILS RELATIVE PERCENT: 2 % (ref 1–4)
ERYTHROCYTE [DISTWIDTH] IN BLOOD BY AUTOMATED COUNT: 12.5 % (ref 11.8–14.4)
GFR SERPL CREATININE-BSD FRML MDRD: >60 ML/MIN/1.73M2
GLUCOSE SERPL-MCNC: 88 MG/DL (ref 70–99)
HCT VFR BLD AUTO: 22.7 % (ref 40.7–50.3)
HCT VFR BLD AUTO: 24.2 % (ref 40.7–50.3)
HGB BLD-MCNC: 7.9 G/DL (ref 13–17)
HGB BLD-MCNC: 8.2 G/DL (ref 13–17)
IMM GRANULOCYTES # BLD AUTO: <0.03 K/UL (ref 0–0.3)
IMM GRANULOCYTES NFR BLD: 0 %
LYMPHOCYTES NFR BLD: 2.53 K/UL (ref 1.1–3.7)
LYMPHOCYTES RELATIVE PERCENT: 43 % (ref 24–43)
MCH RBC QN AUTO: 31.2 PG (ref 25.2–33.5)
MCHC RBC AUTO-ENTMCNC: 33.9 G/DL (ref 28.4–34.8)
MCV RBC AUTO: 92 FL (ref 82.6–102.9)
MONOCYTES NFR BLD: 0.66 K/UL (ref 0.1–1.2)
MONOCYTES NFR BLD: 11 % (ref 3–12)
NEUTROPHILS NFR BLD: 44 % (ref 36–65)
NEUTS SEG NFR BLD: 2.61 K/UL (ref 1.5–8.1)
NRBC BLD-RTO: 0 PER 100 WBC
PLATELET # BLD AUTO: 175 K/UL (ref 138–453)
PMV BLD AUTO: 12.3 FL (ref 8.1–13.5)
POTASSIUM SERPL-SCNC: 3.9 MMOL/L (ref 3.7–5.3)
PROT SERPL-MCNC: 5.3 G/DL (ref 6.4–8.3)
RBC # BLD AUTO: 2.63 M/UL (ref 4.21–5.77)
SODIUM SERPL-SCNC: 141 MMOL/L (ref 135–144)
WBC OTHER # BLD: 6 K/UL (ref 3.5–11.3)

## 2024-03-15 PROCEDURE — 2580000003 HC RX 258

## 2024-03-15 PROCEDURE — 7100000010 HC PHASE II RECOVERY - FIRST 15 MIN: Performed by: SURGERY

## 2024-03-15 PROCEDURE — 2709999900 HC NON-CHARGEABLE SUPPLY: Performed by: SURGERY

## 2024-03-15 PROCEDURE — 85025 COMPLETE CBC W/AUTO DIFF WBC: CPT

## 2024-03-15 PROCEDURE — 2580000003 HC RX 258: Performed by: NURSE ANESTHETIST, CERTIFIED REGISTERED

## 2024-03-15 PROCEDURE — 3609027000 HC COLONOSCOPY: Performed by: SURGERY

## 2024-03-15 PROCEDURE — 0DJ08ZZ INSPECTION OF UPPER INTESTINAL TRACT, VIA NATURAL OR ARTIFICIAL OPENING ENDOSCOPIC: ICD-10-PCS | Performed by: SURGERY

## 2024-03-15 PROCEDURE — 2500000003 HC RX 250 WO HCPCS: Performed by: NURSE ANESTHETIST, CERTIFIED REGISTERED

## 2024-03-15 PROCEDURE — 3700000000 HC ANESTHESIA ATTENDED CARE: Performed by: SURGERY

## 2024-03-15 PROCEDURE — 3609017100 HC EGD: Performed by: SURGERY

## 2024-03-15 PROCEDURE — 0DJD8ZZ INSPECTION OF LOWER INTESTINAL TRACT, VIA NATURAL OR ARTIFICIAL OPENING ENDOSCOPIC: ICD-10-PCS | Performed by: SURGERY

## 2024-03-15 PROCEDURE — 45378 DIAGNOSTIC COLONOSCOPY: CPT | Performed by: SURGERY

## 2024-03-15 PROCEDURE — 43235 EGD DIAGNOSTIC BRUSH WASH: CPT | Performed by: SURGERY

## 2024-03-15 PROCEDURE — 7100000011 HC PHASE II RECOVERY - ADDTL 15 MIN: Performed by: SURGERY

## 2024-03-15 PROCEDURE — 94761 N-INVAS EAR/PLS OXIMETRY MLT: CPT

## 2024-03-15 PROCEDURE — 85014 HEMATOCRIT: CPT

## 2024-03-15 PROCEDURE — A4216 STERILE WATER/SALINE, 10 ML: HCPCS

## 2024-03-15 PROCEDURE — 6360000002 HC RX W HCPCS

## 2024-03-15 PROCEDURE — 85018 HEMOGLOBIN: CPT

## 2024-03-15 PROCEDURE — C9113 INJ PANTOPRAZOLE SODIUM, VIA: HCPCS

## 2024-03-15 PROCEDURE — 3700000001 HC ADD 15 MINUTES (ANESTHESIA): Performed by: SURGERY

## 2024-03-15 PROCEDURE — 6360000002 HC RX W HCPCS: Performed by: NURSE ANESTHETIST, CERTIFIED REGISTERED

## 2024-03-15 PROCEDURE — 80053 COMPREHEN METABOLIC PANEL: CPT

## 2024-03-15 PROCEDURE — 36415 COLL VENOUS BLD VENIPUNCTURE: CPT

## 2024-03-15 RX ORDER — FERROUS SULFATE 325(65) MG
325 TABLET ORAL
Qty: 180 TABLET | Refills: 1 | Status: SHIPPED | OUTPATIENT
Start: 2024-03-15

## 2024-03-15 RX ORDER — BUPIVACAINE HYDROCHLORIDE 5 MG/ML
INJECTION, SOLUTION EPIDURAL; INTRACAUDAL
Status: DISCONTINUED
Start: 2024-03-15 | End: 2024-03-15 | Stop reason: HOSPADM

## 2024-03-15 RX ORDER — SODIUM CHLORIDE 9 MG/ML
INJECTION, SOLUTION INTRAVENOUS PRN
Status: DISCONTINUED | OUTPATIENT
Start: 2024-03-15 | End: 2024-03-15 | Stop reason: HOSPADM

## 2024-03-15 RX ORDER — SODIUM CHLORIDE 0.9 % (FLUSH) 0.9 %
5-40 SYRINGE (ML) INJECTION PRN
Status: CANCELLED | OUTPATIENT
Start: 2024-03-15

## 2024-03-15 RX ORDER — NALOXONE HYDROCHLORIDE 0.4 MG/ML
INJECTION, SOLUTION INTRAMUSCULAR; INTRAVENOUS; SUBCUTANEOUS PRN
Status: DISCONTINUED | OUTPATIENT
Start: 2024-03-15 | End: 2024-03-15 | Stop reason: HOSPADM

## 2024-03-15 RX ORDER — SODIUM CHLORIDE 0.9 % (FLUSH) 0.9 %
5-40 SYRINGE (ML) INJECTION PRN
Status: DISCONTINUED | OUTPATIENT
Start: 2024-03-15 | End: 2024-03-15 | Stop reason: HOSPADM

## 2024-03-15 RX ORDER — LIDOCAINE HYDROCHLORIDE 20 MG/ML
INJECTION, SOLUTION EPIDURAL; INFILTRATION; INTRACAUDAL; PERINEURAL PRN
Status: DISCONTINUED | OUTPATIENT
Start: 2024-03-15 | End: 2024-03-15 | Stop reason: SDUPTHER

## 2024-03-15 RX ORDER — SODIUM CHLORIDE, SODIUM LACTATE, POTASSIUM CHLORIDE, CALCIUM CHLORIDE 600; 310; 30; 20 MG/100ML; MG/100ML; MG/100ML; MG/100ML
INJECTION, SOLUTION INTRAVENOUS CONTINUOUS PRN
Status: DISCONTINUED | OUTPATIENT
Start: 2024-03-15 | End: 2024-03-15 | Stop reason: SDUPTHER

## 2024-03-15 RX ORDER — ESMOLOL HYDROCHLORIDE 10 MG/ML
INJECTION INTRAVENOUS PRN
Status: DISCONTINUED | OUTPATIENT
Start: 2024-03-15 | End: 2024-03-15 | Stop reason: SDUPTHER

## 2024-03-15 RX ORDER — SODIUM CHLORIDE 0.9 % (FLUSH) 0.9 %
5-40 SYRINGE (ML) INJECTION EVERY 12 HOURS SCHEDULED
Status: DISCONTINUED | OUTPATIENT
Start: 2024-03-15 | End: 2024-03-15 | Stop reason: HOSPADM

## 2024-03-15 RX ORDER — PROPOFOL 10 MG/ML
INJECTION, EMULSION INTRAVENOUS PRN
Status: DISCONTINUED | OUTPATIENT
Start: 2024-03-15 | End: 2024-03-15 | Stop reason: SDUPTHER

## 2024-03-15 RX ORDER — SODIUM CHLORIDE 0.9 % (FLUSH) 0.9 %
5-40 SYRINGE (ML) INJECTION EVERY 12 HOURS SCHEDULED
Status: CANCELLED | OUTPATIENT
Start: 2024-03-15

## 2024-03-15 RX ORDER — SODIUM CHLORIDE 9 MG/ML
INJECTION, SOLUTION INTRAVENOUS PRN
Status: CANCELLED | OUTPATIENT
Start: 2024-03-15

## 2024-03-15 RX ADMIN — SODIUM CHLORIDE: 9 INJECTION, SOLUTION INTRAVENOUS at 04:05

## 2024-03-15 RX ADMIN — ESMOLOL HYDROCHLORIDE 20 MG: 100 INJECTION, SOLUTION INTRAVENOUS at 14:23

## 2024-03-15 RX ADMIN — PANTOPRAZOLE SODIUM 40 MG: 40 INJECTION, POWDER, FOR SOLUTION INTRAVENOUS at 11:21

## 2024-03-15 RX ADMIN — PANTOPRAZOLE SODIUM 40 MG: 40 INJECTION, POWDER, FOR SOLUTION INTRAVENOUS at 00:05

## 2024-03-15 RX ADMIN — PIPERACILLIN AND TAZOBACTAM 3375 MG: 3; .375 INJECTION, POWDER, FOR SOLUTION INTRAVENOUS at 05:22

## 2024-03-15 RX ADMIN — SODIUM CHLORIDE, POTASSIUM CHLORIDE, SODIUM LACTATE AND CALCIUM CHLORIDE: 600; 310; 30; 20 INJECTION, SOLUTION INTRAVENOUS at 14:11

## 2024-03-15 RX ADMIN — PROPOFOL 50 MG: 10 INJECTION, EMULSION INTRAVENOUS at 14:13

## 2024-03-15 RX ADMIN — PHENYLEPHRINE HYDROCHLORIDE 200 MCG: 10 INJECTION INTRAVENOUS at 14:32

## 2024-03-15 RX ADMIN — PROPOFOL 200 MCG/KG/MIN: 10 INJECTION, EMULSION INTRAVENOUS at 14:19

## 2024-03-15 RX ADMIN — PHENYLEPHRINE HYDROCHLORIDE 200 MCG: 10 INJECTION INTRAVENOUS at 14:29

## 2024-03-15 RX ADMIN — LIDOCAINE HYDROCHLORIDE 5 ML: 20 INJECTION, SOLUTION EPIDURAL; INFILTRATION; INTRACAUDAL; PERINEURAL at 14:13

## 2024-03-15 RX ADMIN — PROPOFOL 50 MG: 10 INJECTION, EMULSION INTRAVENOUS at 14:16

## 2024-03-15 RX ADMIN — LIDOCAINE HYDROCHLORIDE 5 ML: 20 INJECTION, SOLUTION EPIDURAL; INFILTRATION; INTRACAUDAL; PERINEURAL at 14:16

## 2024-03-15 ASSESSMENT — PAIN SCALES - GENERAL
PAINLEVEL_OUTOF10: 0
PAINLEVEL_OUTOF10: 0

## 2024-03-15 NOTE — PLAN OF CARE
Problem: Discharge Planning  Goal: Discharge to home or other facility with appropriate resources  3/15/2024 1648 by Geni Del Real RN  Outcome: Completed  3/15/2024 0930 by Geni Del Real RN  Outcome: Progressing     Problem: Pain  Goal: Verbalizes/displays adequate comfort level or baseline comfort level  3/15/2024 1648 by Geni Del Real RN  Outcome: Completed  3/15/2024 0930 by Geni Del Real RN  Outcome: Progressing     Problem: Gastrointestinal - Adult  Goal: Maintains or returns to baseline bowel function  3/15/2024 1648 by Geni Del Real RN  Outcome: Completed  3/15/2024 0930 by Geni Del Real RN  Outcome: Progressing     Problem: Hematologic - Adult  Goal: Maintains hematologic stability  3/15/2024 1648 by Geni eDl Real RN  Outcome: Completed  3/15/2024 0930 by Geni Del Real RN  Outcome: Progressing     Problem: ABCDS Injury Assessment  Goal: Absence of physical injury  3/15/2024 1648 by Geni Del Real RN  Outcome: Completed  3/15/2024 0930 by Geni Del Real, RN  Outcome: Progressing     Problem: Nutrition Deficit:  Goal: Optimize nutritional status  3/15/2024 1648 by Geni Del Real RN  Outcome: Completed  3/15/2024 0930 by Geni Del Real, RN  Outcome: Progressing

## 2024-03-15 NOTE — PROGRESS NOTES
Discharge Criteria    Inpatients must meet Criteria 1 through 7. All other patients are either YES or N/A. If a NO is chosen then Anesthesia or Surgeon must be notified.      1.  Minimum 30 minutes after last dose of sedative medication.    Yes      2.  Systolic BP between 90 - 160. Diastolic BP between 60 - 90.    Yes      3.  Pulse between 60 - 120    Yes      4.  Respirations between 8 - 25.    Yes      5.  SpO2 92% - 100%.    Yes      6.  Able to cough and swallow or return to baseline function.    Yes      7.  Alert and oriented or return to baseline mental status.    Yes      8.  Demonstrates controlled, coordinated movements, ambulates with steady gait, or return to baseline activity function.    N/A      9.  Minimal or no pain or nausea, or at a level tolerable and acceptable to patient.    N/A      10. Takes and retains oral fluids as allowed.    N/A      11. Procedural / perioperative site stable.  Minimal or no bleeding.    N/A          12. If GI endoscopy procedure, minimal or no abdominal distention or passing flatus.    N/A      13. Written discharge instructions and emergency telephone number provided.    N/A      14. Accompanied by a responsible adult.    N/A

## 2024-03-15 NOTE — PROGRESS NOTES
Discharge instructions reviewed with patient and his wife at this time. Patient and his wife verbalize understanding of all instructions, including new medications, follow up appointments, and follow up lab work. Patient and wife deny questions. Will escort patient to private car shortly.

## 2024-03-15 NOTE — PROGRESS NOTES
Shift assessment and vitals obtained at this time as charted. Vitals WNL, patient denies pain. Patient is alert and oriented x4. Lung sounds clear throughout. Abdomen is soft, rounded and nontender with active bowel sounds throughout. Reduction in appetite continues. Assessment otherwise as charted, see flowsheets. Patient is resting in the bed with call light in reach, denies other needs at this time. Care ongoing.

## 2024-03-15 NOTE — OP NOTE
Operative Note      Patient: Damien Lopes  YOB: 1980  MRN: [id number]    Date of Procedure: 3/15/2024    Pre-Op Diagnosis Codes:     * Gastrointestinal hemorrhage, unspecified gastrointestinal hemorrhage type [K92.2]    Post-Op Diagnosis: {MH OR SAME:[id number]}     *** nromal egd    ***put referral in for vce outpt dr sanchez et al  Procedure(s):  COLORECTAL CANCER SCREENING, NOT HIGH RISK  ESOPHAGOGASTRODUODENOSCOPY      Surgeon(s):  Marimar Rizvi DO    Assistant:   * No surgical staff found *    Anesthesia: Monitor Anesthesia Care    Estimated Blood Loss (mL): {NUMBERS; EBL:20930}    Complications: {Symptoms; Intra-op complications:11559}    Specimens:   * No specimens in log *    Implants:  * No implants in log *      Drains: * No LDAs found *    Findings: ***        Detailed Description of Procedure:   ***    Electronically signed by Marimar Rizvi DO on 3/15/2024 at 2:57 PM

## 2024-03-15 NOTE — PROGRESS NOTES
Patient arrived back on floor at this time to room 311. Report received from surgery RN. Vitals obtained. Patient denies any needs. Care ongoing.

## 2024-03-15 NOTE — PLAN OF CARE
Problem: Discharge Planning  Goal: Discharge to home or other facility with appropriate resources  Outcome: Progressing     Problem: Pain  Goal: Verbalizes/displays adequate comfort level or baseline comfort level  3/15/2024 0930 by Geni Del Real, RN  Outcome: Progressing     Problem: Gastrointestinal - Adult  Goal: Maintains or returns to baseline bowel function  Outcome: Progressing     Problem: Hematologic - Adult  Goal: Maintains hematologic stability  3/15/2024 0930 by Geni Del Real, RN  Outcome: Progressing     Problem: ABCDS Injury Assessment  Goal: Absence of physical injury  Outcome: Progressing     Problem: Nutrition Deficit:  Goal: Optimize nutritional status  Outcome: Progressing

## 2024-03-15 NOTE — DISCHARGE SUMMARY
Discharge Summary    Damien Lopes  :  1980  MRN:  [id number]    Admit date:  3/13/2024      Discharge date: 3/15/2024     Admitting Physician:  Jamie Cruz MD    Discharge Diagnoses:    Principal Problem:    Rectal bleeding  Active Problems:    Acute appendicitis  Resolved Problems:    * No resolved hospital problems. *      Hospital Course:   Damien Lopes is a 44 y.o. male admitted with rectal bleeding.  He presented with complaints of rectal bleeding.  Patient complained of fatigue.  He stated his stools have been darker than normal.  He stated when he used the bathroom and had a bowel movement he had bright red bleeding.  He reported that he passed out woke up on the floor.  He denied fever or chills.  Denies shortness of breath or chest pain.  Denied dizziness.  Patient reported he had headache the week before and was on aspirin and ibuprofen.  He states he felt that he was not using them excessively.  During his workup patient was hypotensive and was given IV fluids.  Hemoglobin initially was 10.7.  CT abdomen and pelvis was concerning for acute appendicitis.  Patient was admitted placed on IV fluids and IV antibiotics.  General surgery was consulted.  Stool was all occult positive.  He was placed on IV Protonix.  Patient completed GoLytely prep and underwent colonoscopy which was negative.  EGD was also negative.  No trace of bleeding.  Patient was started on ferrous sulfate 325 mg daily for iron supplementation as recommended by general surgery.  They will complete a capsule endoscopy as an outpatient which general surgery will arrange.  Plan will be to discharge patient home today he will stop NSAIDs at this time.  Per general surgery antibiotics are stopped and no need for antibiotics on discharge.  He will follow-up with general surgery and primary care as an outpatient.  I will ask that he get repeat labs in 1 week.    Consultants:  Dr. Rizvi, gen surg    Procedures:   medications were sent to [facility] [address] [place] [address] - [address] [facility] [address] - P [phone] - F [phone]  [address] [facility] [address] [place] [address]      Phone: [phone]   ferrous sulfate 325 (65 Fe) MG tablet         Patient Instructions:   Activity: activity as tolerated  Diet: regular diet  Wound Care: none needed  Other: None    Disposition:   Discharge to Home    Follow up:  Patient will be followed by Prashant Brannon APRN - CNP in 1-2 weeks    CORE MEASURES on Discharge (if applicable)  ACE/ARB in CHF: NA  Statin in MI: NA  ASA in MI: NA  Statin in CVA: NA  Antiplatelet in CVA: NA    Total time spent on discharge services: 40 minutes    Including the following activities:  Evaluation and Management of patient  Discussion with patient and/or surrogate about current care plan  Coordination with Case Management and/or   Coordination of care with Consultants (if applicable)   Coordination of care with Receiving Facility Physician (if applicable)  Completion of DME forms (if applicable)  Preparation of Discharge Summary  Preparation of Medication Reconciliation  Preparation of Discharge Prescriptions    Signed:  PAOLA Mustafa CNP, PAOLA, NP-C  3/15/2024, 3:02 PM

## 2024-03-15 NOTE — PROGRESS NOTES
Pt sitting up in bed when writer entered the room. Pt is A&O 4. Vitals and assessment as charted. Pt denies pain. Pt denies lightheadedness and dizziness. Pt denies any further needs at this time. Call light within reach. Bed alarm on.

## 2024-03-15 NOTE — PLAN OF CARE
Problem: Pain  Goal: Verbalizes/displays adequate comfort level or baseline comfort level  Outcome: Progressing  Note: Pt is able to verbalize when in pain. Pt denies pain at this time. Will continue to monitor.      Problem: Hematologic - Adult  Goal: Maintains hematologic stability  Outcome: Progressing  Note: H/H being monitored every 6 hours.

## 2024-03-15 NOTE — ANESTHESIA PRE PROCEDURE
results for input(s): \"POCGLU\", \"POCNA\", \"POCK\", \"POCCL\", \"POCBUN\", \"POCHEMO\", \"POCHCT\" in the last 72 hours.    Coags:   Lab Results   Component Value Date/Time    PROTIME 14.6 03/13/2024 08:13 PM    INR 1.1 03/13/2024 08:13 PM       HCG (If Applicable): No results found for: \"PREGTESTUR\", \"PREGSERUM\", \"HCG\", \"HCGQUANT\"     ABGs: No results found for: \"PHART\", \"PO2ART\", \"DDR1XYD\", \"UVV3WHJ\", \"BEART\", \"Q5IUTRES\"     Type & Screen (If Applicable):  No results found for: \"LABABO\", \"LABRH\"    Drug/Infectious Status (If Applicable):  No results found for: \"HIV\", \"HEPCAB\"    COVID-19 Screening (If Applicable):   Lab Results   Component Value Date/Time    COVID19 Not Detected 02/17/2022 05:02 PM    COVID19 Not Detected 07/02/2020 07:23 AM           Anesthesia Evaluation     no history of anesthetic complications:   Airway: Mallampati: IV  TM distance: >3 FB   Neck ROM: full  Mouth opening: > = 3 FB   Dental: normal exam         Pulmonary:Negative Pulmonary ROS and normal exam  breath sounds clear to auscultation                             Cardiovascular:Negative CV ROS  Exercise tolerance: good (>4 METS)                    Neuro/Psych:   Negative Neuro/Psych ROS              GI/Hepatic/Renal:   (+) GERD:, bowel prep          Endo/Other:    (+) blood dyscrasia: anemia:..                 Abdominal: normal exam            Vascular: negative vascular ROS.         Other Findings:       Anesthesia Plan      general and TIVA     ASA 2       Induction: intravenous.      Anesthetic plan and risks discussed with patient.                    Renetta Milner, PAOLA - CRNA   3/15/2024

## 2024-03-15 NOTE — ANESTHESIA POSTPROCEDURE EVALUATION
Department of Anesthesiology  Postprocedure Note    Patient: Damien Lopes  MRN: [id number]  YOB: 1980  Date of evaluation: 3/15/2024    Procedure Summary       Date: 03/15/24 Room / Location: [place] [address] / [facility]    Anesthesia Start: 1411 Anesthesia Stop: 1458    Procedures:       COLORECTAL CANCER SCREENING, NOT HIGH RISK      ESOPHAGOGASTRODUODENOSCOPY Diagnosis:       Gastrointestinal hemorrhage, unspecified gastrointestinal hemorrhage type      (Gastrointestinal hemorrhage, unspecified gastrointestinal hemorrhage type [K92.2])    Surgeons: Marimar Rizvi DO Responsible Provider: Ty Pace APRN - CRNA    Anesthesia Type: general, TIVA ASA Status: 2            Anesthesia Type: No value filed.    Sam Phase I: Sam Score: 10    Sam Phase II: Sam Score: 10    Anesthesia Post Evaluation    Patient location during evaluation: PACU  Patient participation: complete - patient participated  Level of consciousness: awake and alert  Airway patency: patent  Nausea & Vomiting: no nausea and no vomiting  Cardiovascular status: hemodynamically stable  Respiratory status: acceptable, spontaneous ventilation and room air  Hydration status: euvolemic  Multimodal analgesia pain management approach  Pain management: adequate and satisfactory to patient        No notable events documented.

## 2024-03-15 NOTE — DISCHARGE INSTRUCTIONS
Patient Instructions:   Activity: activity as tolerated  Diet: regular diet     Follow up:  Patient will be followed by Prashant Brannon APRN - CNP in 1-2 weeks

## 2024-03-15 NOTE — PROGRESS NOTES
Progress Note    SUBJECTIVE:    Patient seen for f/u of Rectal bleeding.  He resting in bed no distress.  Awaiting colonoscopy    ROS:   Constitutional: negative  for fevers, and negative for chills.  Respiratory: negative for shortness of breath, negative for cough, and negative for wheezing  Cardiovascular: negative for chest pain, and negative for palpitations  Gastrointestinal: negative for abdominal pain, negative for nausea,negative for vomiting, negative for diarrhea, and negative for constipation     All other systems were reviewed with the patient and are negative unless otherwise stated in HPI      OBJECTIVE:      Vitals:   Vitals:    03/15/24 0705   BP: 110/65   Pulse: 77   Resp: 15   Temp: 98.1 °F (36.7 °C)   SpO2: 99%     Weight - Scale: 93.7 kg (206 lb 8 oz)   Height: 185.4 cm (6' 1\")     Weight  Wt Readings from Last 3 Encounters:   03/15/24 93.7 kg (206 lb 8 oz)   12/27/23 94.7 kg (208 lb 12.8 oz)   11/30/23 94.2 kg (207 lb 11.2 oz)     Body mass index is 27.24 kg/m².    24HR INTAKE/OUTPUT:      Intake/Output Summary (Last 24 hours) at 3/15/2024 0744  Last data filed at 3/15/2024 0710  Gross per 24 hour   Intake 6539.49 ml   Output 1750 ml   Net 4789.49 ml     -----------------------------------------------------------------  Exam:    GEN:    Awake, alert and oriented x3.   EYES:  EOMI, pupils equal   NECK: Supple. No lymphadenopathy.  No carotid bruit  CVS:    regular rate and rhythm, no audible murmur  PULM:  CTA, no wheezes, rales or rhonchi, no acute respiratory distress  ABD:    Bowels sounds normal.  Abdomen is soft.  No distention.  no tenderness to palpation.   EXT:   no edema bilaterally .  No calf tenderness.   NEURO: Moves all extremities.  Motor and sensory are grossly intact  SKIN:  No rashes.  No skin lesions.    -----------------------------------------------------------------    Diagnostic Data:      Complete Blood Count:   Recent Labs     03/13/24 2013 03/14/24  0340 03/14/24  0533  all inflammatory   Condition is stable  Treatment plan:   Consult general surgery  CBC, CMP  Imaging: no further imaging studies ordered today  Medications:   IV Zosyn  Continue IVF     Nutrition status:   Well developed, well nourished with no malnutrition  Dietician consult initiated     Hospital Prophylaxis:   DVT: none due to GI bleeding    Stress Ulcer: PPI      Disposition:  Shared decision making: All test results, treatment options and disposition options were discussed with the patient today  Social determinants of health that may impact management: none  Code status: Full Code   Disposition: Discharge plan is pending    [facility] Advanced Care Planning documentation:  [x] I have confirmed that the patient's Advance Care Plan is present, Code Status is documented, or surrogate decision maker is listed in the patient's medical record  [If \"yes\", STOP HERE]     [] The patient's Advance Care Plan is NOT present because:    []  I confirmed today that the patient does not wish or was not able to name a   surrogate decision maker or provide and advance care plan.    [] Hospice care is currently being provided or has been provided within the   calendar year.    []  I did NOT confirm today the presence of an Advance Care Plan or surrogate   decision maker documented within the patient's medical record.   [DOES NOT SATISFY [facility] PERFORMANCE]    Liyah Soler, PAOLA - CNP , APRVANESSA, NP-C  [facility]        3/15/2024, 7:44 AM

## 2024-03-15 NOTE — PROGRESS NOTES
Patient taken back to [facility] per cart. Alert and oriented. No complaints. Assisted patient up to restroom with standby assist. Wife at side. Report to [facility] nurseGeni.

## 2024-03-17 ENCOUNTER — PATIENT MESSAGE (OUTPATIENT)
Dept: PRIMARY CARE CLINIC | Age: 44
End: 2024-03-17

## 2024-03-17 DIAGNOSIS — R55 SYNCOPE, UNSPECIFIED SYNCOPE TYPE: Primary | ICD-10-CM

## 2024-03-18 ENCOUNTER — CARE COORDINATION (OUTPATIENT)
Dept: OTHER | Facility: CLINIC | Age: 44
End: 2024-03-18

## 2024-03-18 DIAGNOSIS — K92.1 MELENA: ICD-10-CM

## 2024-03-18 DIAGNOSIS — Z87.19 H/O: GI BLEED: Primary | ICD-10-CM

## 2024-03-18 NOTE — TELEPHONE ENCOUNTER
I will add the monitor and magnesium orders today.  I would like him to hold off on the labs until the 22nd.  Thank you.

## 2024-03-18 NOTE — CARE COORDINATION
Care Transitions Outreach Attempt    Call within 2 business days of discharge: Yes   Attempted to reach patient for transitions of care follow up. Unable to reach patient.    Patient: Damien Lopes Patient : 1980 MRN: [id number]    Last Discharge Facility       Date Complaint Diagnosis Description Type Department Provider    3/13/24 Rectal Bleeding Gastrointestinal hemorrhage, unspecified gastrointestinal hemorrhage type ED to Hosp-Admission (Discharged) (ADMITTED) [facility] Jamie Cruz MD              Was this an external facility discharge? No Discharge Facility Name: [facility]    Noted following upcoming appointments from discharge chart review:   [facility] follow up appointment(s):   Future Appointments   Date Time Provider Department Center   3/19/2024 12:20 PM Prashant Brannon, APRN - CNP [facility]   4/3/2024 11:00 AM Marimar Rizvi DO [facility]   2024  8:00 AM Prashant Brannon APRN - CNP [facility]     Non-[facility]  follow up appointment(s): unknown    ACM outreached patient for introduction to Associate Care Management related to admission for rectal bleeding. Patient currently working and will return call to this ACM later this afternoon.    Plan for follow-up call in 1-2 days

## 2024-03-19 ENCOUNTER — OFFICE VISIT (OUTPATIENT)
Dept: PRIMARY CARE CLINIC | Age: 44
End: 2024-03-19

## 2024-03-19 ENCOUNTER — CARE COORDINATION (OUTPATIENT)
Dept: OTHER | Facility: CLINIC | Age: 44
End: 2024-03-19

## 2024-03-19 VITALS
OXYGEN SATURATION: 98 % | TEMPERATURE: 98.5 F | DIASTOLIC BLOOD PRESSURE: 68 MMHG | HEART RATE: 84 BPM | RESPIRATION RATE: 18 BRPM | BODY MASS INDEX: 26.24 KG/M2 | SYSTOLIC BLOOD PRESSURE: 102 MMHG | WEIGHT: 198.9 LBS

## 2024-03-19 DIAGNOSIS — F43.9 STRESS: ICD-10-CM

## 2024-03-19 DIAGNOSIS — G44.209 TENSION HEADACHE: ICD-10-CM

## 2024-03-19 DIAGNOSIS — Z09 HOSPITAL DISCHARGE FOLLOW-UP: Primary | ICD-10-CM

## 2024-03-19 DIAGNOSIS — K92.2 ACUTE GI BLEEDING: ICD-10-CM

## 2024-03-19 DIAGNOSIS — K62.5 RECTAL BLEEDING: ICD-10-CM

## 2024-03-19 RX ORDER — AMITRIPTYLINE HYDROCHLORIDE 10 MG/1
10 TABLET, FILM COATED ORAL NIGHTLY
Qty: 90 TABLET | Refills: 0 | Status: SHIPPED | OUTPATIENT
Start: 2024-03-19

## 2024-03-19 ASSESSMENT — PATIENT HEALTH QUESTIONNAIRE - PHQ9
1. LITTLE INTEREST OR PLEASURE IN DOING THINGS: NOT AT ALL
7. TROUBLE CONCENTRATING ON THINGS, SUCH AS READING THE NEWSPAPER OR WATCHING TELEVISION: NOT AT ALL
SUM OF ALL RESPONSES TO PHQ9 QUESTIONS 1 & 2: 0
SUM OF ALL RESPONSES TO PHQ QUESTIONS 1-9: 0
3. TROUBLE FALLING OR STAYING ASLEEP: NOT AT ALL
4. FEELING TIRED OR HAVING LITTLE ENERGY: NOT AT ALL
9. THOUGHTS THAT YOU WOULD BE BETTER OFF DEAD, OR OF HURTING YOURSELF: NOT AT ALL
8. MOVING OR SPEAKING SO SLOWLY THAT OTHER PEOPLE COULD HAVE NOTICED. OR THE OPPOSITE, BEING SO FIGETY OR RESTLESS THAT YOU HAVE BEEN MOVING AROUND A LOT MORE THAN USUAL: NOT AT ALL
10. IF YOU CHECKED OFF ANY PROBLEMS, HOW DIFFICULT HAVE THESE PROBLEMS MADE IT FOR YOU TO DO YOUR WORK, TAKE CARE OF THINGS AT HOME, OR GET ALONG WITH OTHER PEOPLE: NOT DIFFICULT AT ALL
SUM OF ALL RESPONSES TO PHQ QUESTIONS 1-9: 0
5. POOR APPETITE OR OVEREATING: NOT AT ALL
2. FEELING DOWN, DEPRESSED OR HOPELESS: NOT AT ALL

## 2024-03-19 NOTE — PROGRESS NOTES
Post-Discharge Transitional Care  Follow Up      Damien Lopes   YOB: 1980    Date of Office Visit:  3/19/2024  Date of Hospital Admission: 3/13/24  Date of Hospital Discharge: 3/15/24  Risk of hospital readmission (high >=14%. Medium >=10%) :Readmission Risk Score: 8.2      Care management risk score Rising risk (score 2-5) and Complex Care (Scores >=6): No Risk Score On File     Non face to face  following discharge, date last encounter closed (first attempt may have been earlier): 03/18/2024    Call initiated 2 business days of discharge: Yes    ASSESSMENT/PLAN:   Hospital discharge follow-up  -     MT DISCHARGE MEDS RECONCILED W/ CURRENT OUTPATIENT MED LIST  Acute GI bleeding  -     Reticulocytes; Future  Rectal bleeding  -     Reticulocytes; Future  Tension headache  -     amitriptyline (ELAVIL) 10 MG tablet; Take 1 tablet by mouth nightly, Disp-90 tablet, R-0Normal  Stress  -     amitriptyline (ELAVIL) 10 MG tablet; Take 1 tablet by mouth nightly, Disp-90 tablet, R-0Normal      Medical Decision Making: moderate complexity  Return if symptoms worsen or fail to improve.           Subjective:   HPI:  Follow up of Hospital problems/diagnosis(es):               Hospital Course:   Damien Lopes is a 44 y.o. male admitted with rectal bleeding.  He presented with complaints of rectal bleeding.  Patient complained of fatigue.  He stated his stools have been darker than normal.  He stated when he used the bathroom and had a bowel movement he had bright red bleeding.  He reported that he passed out woke up on the floor.  He denied fever or chills.  Denies shortness of breath or chest pain.  Denied dizziness.  Patient reported he had headache the week before and was on aspirin and ibuprofen.  He states he felt that he was not using them excessively.  During his workup patient was hypotensive and was given IV fluids.  Hemoglobin initially was 10.7.  CT abdomen and pelvis was concerning for acute  No

## 2024-03-19 NOTE — PATIENT INSTRUCTIONS
SURVEY:     You may be receiving a survey from [facility] [employer] regarding your visit today.     Please complete the survey to enable us to provide the highest quality of care to you and your family.     If you cannot score us a very good on any question, please call the office to discuss how we could have made your experience a very good one.     Thank you,    Prashant Brannon, APRN-CNP  Bryanna Wilson, APRN-CNP  Manuela, LPN  Marisabel, CMA  Rigoberto, CMA  Jackelyn, CMA  Wilma, PCA  Fatou, CMA  Nu, PM

## 2024-03-19 NOTE — PROGRESS NOTES
Physician Progress Note      PATIENT:               NA BRONSON  CSN #:                  [id number]  :                       1980  ADMIT DATE:       3/13/2024 8:02 PM  DISCH DATE:        3/15/2024 4:49 PM  RESPONDING  PROVIDER #:        Jamie Cruz MD          QUERY TEXT:    Pt admitted with rectal bleeding. Pt noted to have admission hemoglobin 10.7   -> 7.9 on 3/15/24    If possible, please document in the progress notes and discharge summary if   you are evaluating and/or treating any of the following:    The medical record reflects the following:  Risk Factors: rectal bleeding  Clinical Indicators:   Hgb / Hct  22    15.2 / 44.8  prior to admission  3/13/24     10.7 / 31.5  admission  3/14/24       8.7 / 25.3  3/15/24       7.9 / 22.7  3/15/24       8.2 / 24.2  Treatment: NS IV boluses, 2000 ml in ED, IV NS infusion, lab monitoring, type   and screen    Tanya Molina, MSN, RN, CCDS, CRCR  Clinical   .  Options provided:  -- Acute blood loss anemia  -- Acute on chronic blood loss anemia  -- Dilutional anemia  -- Other - I will add my own diagnosis  -- Disagree - Not applicable / Not valid  -- Disagree - Clinically unable to determine / Unknown  -- Refer to Clinical Documentation Reviewer    PROVIDER RESPONSE TEXT:    This patient has acute blood loss anemia.    Query created by: Tanya Molina on 3/15/2024 9:38 AM      Electronically signed by:  Jamie Cruz MD 3/19/2024 6:59 AM

## 2024-03-19 NOTE — CARE COORDINATION
Care Transitions Initial Follow Up Call    Call within 2 business days of discharge: Yes    Patient Current Location:  [place]    Care Transition Nurse contacted the patient by telephone to perform post hospital discharge assessment. Verified name and  with patient as identifiers. Provided introduction to self, and explanation of the Care Transition Nurse role.     Patient: Damien Lopes Patient : 1980   MRN: [id number]  Reason for Admission: Rectal bleeding  Discharge Date: 3/15/24 RARS: Readmission Risk Score: 8.2      Last Discharge Facility       Date Complaint Diagnosis Description Type Department Provider    3/13/24 Rectal Bleeding Gastrointestinal hemorrhage, unspecified gastrointestinal hemorrhage type ED to [facility] (Discharged[facility]            Was this an external facility discharge? No Discharge Facility: [facility]    Challenges to be reviewed by the provider   Additional needs identified to be addressed with provider: No  none               Method of communication with provider: none.    Spoke with patient for initial care transition call post hospital discharge. Med review completed.  Patient instructed to refrain from taking any NSAIDS, including but not limited to Advil, Motrin, Ibuprofen, Alleve, Naproxen, Mobic, unless instructed by physician. Patient instructed to avoid Excedrin Migraine for headaches due to containing aspirin.  Patient verbalized understanding.   Patient states only taking Iron daily.  Patient to discuss other meds with PCP at appt today due to migraines/headaches.  Patient denies anxiety/depression at this time.    Patient presented to the emergency department on 3/13/24 for rectal bleeding.  Patient states wife found patient passed out on floor due to blood loss.  Patient denies rectal bleeding, lightheadedness, dizziness at this time.  Patient reports tired due to blood loss and headaches that are improving but still there.

## 2024-03-20 ENCOUNTER — TELEPHONE (OUTPATIENT)
Dept: PRIMARY CARE CLINIC | Age: 44
End: 2024-03-20

## 2024-03-20 NOTE — PROGRESS NOTES
Physician Progress Note      PATIENT:               NA BRONSON  CSN #:                  [id number]  :                       1980  ADMIT DATE:       3/13/2024 8:02 PM  DISCH DATE:        3/15/2024 4:49 PM  RESPONDING  PROVIDER #:        Jamie Cruz MD          QUERY TEXT:    Pt admitted 3/13/24 (discharged 3/15/24) with rectal bleeding.  Per colonoscopy report: mild grade 1 internal hemorrhoids    If possible, please document in progress notes and discharge summary the   relationship, if any, between rectal bleeding and internal hemorrhoids.    The medical record reflects the following:  Risk Factors: per H and P taking aspirin and ibuprofen  Clinical Indicators: per discharge summary: \"He stated he used the bathroom   and had a bowel movement he had bright red bleeding\"  EGD negative,   colonoscopy showed mild grade 1 internal hemorrhoids without bleeding or   inflammation;   admission Hgb 10.7 -> 8.2 on 3/15/24;  stool occult blood   positive  Treatment: IVF, IV Protonix,  general surgery consult,  per discharge   instructions:start  ferrous sulfate . Stop taking Ibuprofen, plan for capsule   endoscopy    Tanya Molina, MSN, RN, CCDS, CRCR  Clinical   .  Options provided:  -- Rectal bleeding likely due to internal hemorrhoids  -- Rectal bleeding unrelated to internal hemorrhoids  -- Other - I will add my own diagnosis  -- Disagree - Not applicable / Not valid  -- Disagree - Clinically unable to determine / Unknown  -- Refer to Clinical Documentation Reviewer    PROVIDER RESPONSE TEXT:    This patient has rectal bleeding likely due to internal hemorrhoids.    Query created by: Tanya Molina on 3/20/2024 11:56 AM      Electronically signed by:  Jamie Cruz MD 3/20/2024 2:29 PM

## 2024-03-22 ENCOUNTER — HOSPITAL ENCOUNTER (OUTPATIENT)
Age: 44
Setting detail: SPECIMEN
Discharge: HOME | End: 2024-03-22
Payer: COMMERCIAL

## 2024-03-22 DIAGNOSIS — K62.5 RECTAL BLEEDING: ICD-10-CM

## 2024-03-22 DIAGNOSIS — K92.2 GASTROINTESTINAL HEMORRHAGE, UNSPECIFIED GASTROINTESTINAL HEMORRHAGE TYPE: ICD-10-CM

## 2024-03-22 DIAGNOSIS — K92.2 ACUTE GI BLEEDING: ICD-10-CM

## 2024-03-22 DIAGNOSIS — R55 SYNCOPE, UNSPECIFIED SYNCOPE TYPE: ICD-10-CM

## 2024-03-22 LAB
ANION GAP SERPL CALCULATED.3IONS-SCNC: 10 MMOL/L (ref 9–17)
BASOPHILS # BLD: 0.04 K/UL (ref 0–0.2)
BASOPHILS NFR BLD: 1 % (ref 0–2)
BUN SERPL-MCNC: 12 MG/DL (ref 6–20)
BUN/CREAT SERPL: 13 (ref 9–20)
CALCIUM SERPL-MCNC: 9 MG/DL (ref 8.6–10.4)
CHLORIDE SERPL-SCNC: 103 MMOL/L (ref 98–107)
CO2 SERPL-SCNC: 27 MMOL/L (ref 20–31)
CREAT SERPL-MCNC: 0.9 MG/DL (ref 0.7–1.2)
EOSINOPHIL # BLD: 0.14 K/UL (ref 0–0.44)
EOSINOPHILS RELATIVE PERCENT: 3 % (ref 1–4)
ERYTHROCYTE [DISTWIDTH] IN BLOOD BY AUTOMATED COUNT: 13 % (ref 11.8–14.4)
GFR SERPL CREATININE-BSD FRML MDRD: >60 ML/MIN/1.73M2
GLUCOSE SERPL-MCNC: 88 MG/DL (ref 70–99)
HCT VFR BLD AUTO: 31.5 % (ref 40.7–50.3)
HGB BLD-MCNC: 10.7 G/DL (ref 13–17)
IMM GRANULOCYTES # BLD AUTO: <0.03 K/UL (ref 0–0.3)
IMM GRANULOCYTES NFR BLD: 0 %
IMM RETICS NFR: 23.5 % (ref 2.7–18.3)
LYMPHOCYTES NFR BLD: 2.02 K/UL (ref 1.1–3.7)
LYMPHOCYTES RELATIVE PERCENT: 39 % (ref 24–43)
MAGNESIUM SERPL-MCNC: 2.1 MG/DL (ref 1.6–2.6)
MCH RBC QN AUTO: 31.4 PG (ref 25.2–33.5)
MCHC RBC AUTO-ENTMCNC: 34 G/DL (ref 28.4–34.8)
MCV RBC AUTO: 92.4 FL (ref 82.6–102.9)
MONOCYTES NFR BLD: 0.56 K/UL (ref 0.1–1.2)
MONOCYTES NFR BLD: 11 % (ref 3–12)
NEUTROPHILS NFR BLD: 47 % (ref 36–65)
NEUTS SEG NFR BLD: 2.47 K/UL (ref 1.5–8.1)
NRBC BLD-RTO: 0 PER 100 WBC
PLATELET # BLD AUTO: 268 K/UL (ref 138–453)
PMV BLD AUTO: 12.2 FL (ref 8.1–13.5)
POTASSIUM SERPL-SCNC: 4.4 MMOL/L (ref 3.7–5.3)
RBC # BLD AUTO: 3.41 M/UL (ref 4.21–5.77)
RETIC HEMOGLOBIN: 30.6 PG (ref 28.2–35.7)
RETICS # AUTO: 0.14 M/UL (ref 0.03–0.08)
RETICS/RBC NFR AUTO: 4 % (ref 0.5–1.9)
SODIUM SERPL-SCNC: 140 MMOL/L (ref 135–144)
WBC OTHER # BLD: 5.2 K/UL (ref 3.5–11.3)

## 2024-03-22 PROCEDURE — 85045 AUTOMATED RETICULOCYTE COUNT: CPT

## 2024-03-22 PROCEDURE — 80048 BASIC METABOLIC PNL TOTAL CA: CPT

## 2024-03-22 PROCEDURE — 85025 COMPLETE CBC W/AUTO DIFF WBC: CPT

## 2024-03-22 PROCEDURE — 83735 ASSAY OF MAGNESIUM: CPT

## 2024-03-22 NOTE — PROGRESS NOTES
Physician Progress Note      PATIENT:               NA BRONSON  [facility] #:                  [id number]  :                       1980  ADMIT DATE:       3/13/2024 8:02 PM  DISCH DATE:        3/15/2024 4:49 PM  RESPONDING  PROVIDER #:        Jamie Cruz MD          QUERY TEXT:    Patient admitted 3/13/24 (discharged 3/15/24) with rectal bleeding and acute   appendicitis.  Per General Surgery consult: \"Clinically not acute appendicitis as reported on   CT\"  Acute appendicitis is listed as a discharge diagnosis in the discharge   summary.    If possible, please document in the progress notes and discharge summary if   acute appendicitis was:    The medical record reflects the following:  Risk Factors: 43 y/o male  Clinical Indicators: admitted with rectal bleeding, fatigue;  3/13/24 CT   abd/pelvis: Findings are suspicious for acute appendicitis. No evidence of   perforation or abscess formation:  Per General Surgery consult: \"Clinically   not acute appendicitis as reported on CT\"  Treatment: IV NS boluses in ED, IV Zosyn, general surgery consult, EGD,   colonoscopy    Tanya Molina, MSN, RN, CCDS, CRCR  Clinical   .  Options provided:  -- Acute appendicitis confirmed as evidenced by, (please specify).  -- Acute appendicitis ruled out after study  -- Defer to General Surgery consultant documentation regarding documentation   of acute appendicitis  -- Other - I will add my own diagnosis  -- Disagree - Not applicable / Not valid  -- Disagree - Clinically unable to determine / Unknown  -- Refer to Clinical Documentation Reviewer    PROVIDER RESPONSE TEXT:    I defer to General Surgery consultant regarding documentation of acute   appendicitis.    Query created by: Tanya Molina on 3/22/2024 9:42 AM      Electronically signed by:  Jamie Cruz MD 3/22/2024 6:12 PM

## 2024-03-25 ENCOUNTER — TELEPHONE (OUTPATIENT)
Dept: PRIMARY CARE CLINIC | Age: 44
End: 2024-03-25

## 2024-03-25 NOTE — TELEPHONE ENCOUNTER
----- Message from PAOLA Rai CNP sent at 3/25/2024 10:21 AM EDT -----  Blood count is improving. Labs are stable.

## 2024-03-28 ENCOUNTER — HOSPITAL ENCOUNTER (OUTPATIENT)
Age: 44
Discharge: HOME | End: 2024-03-30
Payer: COMMERCIAL

## 2024-03-28 DIAGNOSIS — R55 SYNCOPE, UNSPECIFIED SYNCOPE TYPE: ICD-10-CM

## 2024-03-28 PROCEDURE — 93243 EXT ECG>48HR<7D SCAN A/R: CPT

## 2024-04-05 ENCOUNTER — HOSPITAL ENCOUNTER (OUTPATIENT)
Age: 44
Discharge: HOME | End: 2024-04-05
Payer: COMMERCIAL

## 2024-04-05 ENCOUNTER — TELEPHONE (OUTPATIENT)
Dept: PRIMARY CARE CLINIC | Age: 44
End: 2024-04-05

## 2024-04-05 DIAGNOSIS — R17 JAUNDICE: ICD-10-CM

## 2024-04-05 DIAGNOSIS — R17 JAUNDICE: Primary | ICD-10-CM

## 2024-04-05 LAB
ALBUMIN SERPL-MCNC: 4.5 G/DL (ref 3.5–5.2)
ALBUMIN/GLOB SERPL: 1.4 {RATIO} (ref 1–2.5)
ALP SERPL-CCNC: 72 U/L (ref 40–129)
ALT SERPL-CCNC: 17 U/L (ref 5–41)
ANION GAP SERPL CALCULATED.3IONS-SCNC: 11 MMOL/L (ref 9–17)
AST SERPL-CCNC: 20 U/L
BASOPHILS # BLD: 0.04 K/UL (ref 0–0.2)
BASOPHILS NFR BLD: 1 % (ref 0–2)
BILIRUB SERPL-MCNC: 0.2 MG/DL (ref 0.3–1.2)
BUN SERPL-MCNC: 14 MG/DL (ref 6–20)
BUN/CREAT SERPL: 18 (ref 9–20)
CALCIUM SERPL-MCNC: 9.3 MG/DL (ref 8.6–10.4)
CHLORIDE SERPL-SCNC: 101 MMOL/L (ref 98–107)
CO2 SERPL-SCNC: 25 MMOL/L (ref 20–31)
CREAT SERPL-MCNC: 0.8 MG/DL (ref 0.7–1.2)
EOSINOPHIL # BLD: 0.11 K/UL (ref 0–0.44)
EOSINOPHILS RELATIVE PERCENT: 2 % (ref 1–4)
ERYTHROCYTE [DISTWIDTH] IN BLOOD BY AUTOMATED COUNT: 12.6 % (ref 11.8–14.4)
GFR SERPL CREATININE-BSD FRML MDRD: >90 ML/MIN/1.73M2
GLUCOSE SERPL-MCNC: 84 MG/DL (ref 70–99)
HAV IGM SERPL QL IA: NONREACTIVE
HBV CORE IGM SERPL QL IA: NONREACTIVE
HBV SURFACE AG SERPL QL IA: NONREACTIVE
HCT VFR BLD AUTO: 39.1 % (ref 40.7–50.3)
HCV AB SERPL QL IA: NONREACTIVE
HGB BLD-MCNC: 12.6 G/DL (ref 13–17)
IMM GRANULOCYTES # BLD AUTO: <0.03 K/UL (ref 0–0.3)
IMM GRANULOCYTES NFR BLD: 0 %
IMM RETICS NFR: 16.5 % (ref 2.7–18.3)
LYMPHOCYTES NFR BLD: 2.75 K/UL (ref 1.1–3.7)
LYMPHOCYTES RELATIVE PERCENT: 38 % (ref 24–43)
MCH RBC QN AUTO: 30.6 PG (ref 25.2–33.5)
MCHC RBC AUTO-ENTMCNC: 32.2 G/DL (ref 28.4–34.8)
MCV RBC AUTO: 94.9 FL (ref 82.6–102.9)
MONOCYTES NFR BLD: 0.65 K/UL (ref 0.1–1.2)
MONOCYTES NFR BLD: 9 % (ref 3–12)
NEUTROPHILS NFR BLD: 51 % (ref 36–65)
NEUTS SEG NFR BLD: 3.73 K/UL (ref 1.5–8.1)
NRBC BLD-RTO: 0 PER 100 WBC
PLATELET # BLD AUTO: 236 K/UL (ref 138–453)
PMV BLD AUTO: 11.7 FL (ref 8.1–13.5)
POTASSIUM SERPL-SCNC: 3.9 MMOL/L (ref 3.7–5.3)
PROT SERPL-MCNC: 7.8 G/DL (ref 6.4–8.3)
RBC # BLD AUTO: 4.12 M/UL (ref 4.21–5.77)
RETIC HEMOGLOBIN: 32.4 PG (ref 28.2–35.7)
RETICS # AUTO: 0.08 M/UL (ref 0.03–0.08)
RETICS/RBC NFR AUTO: 1.9 % (ref 0.5–1.9)
SODIUM SERPL-SCNC: 137 MMOL/L (ref 135–144)
WBC OTHER # BLD: 7.3 K/UL (ref 3.5–11.3)

## 2024-04-05 PROCEDURE — 36415 COLL VENOUS BLD VENIPUNCTURE: CPT

## 2024-04-05 PROCEDURE — 85045 AUTOMATED RETICULOCYTE COUNT: CPT

## 2024-04-05 PROCEDURE — 80074 ACUTE HEPATITIS PANEL: CPT

## 2024-04-05 PROCEDURE — 80053 COMPREHEN METABOLIC PANEL: CPT

## 2024-04-05 PROCEDURE — 85025 COMPLETE CBC W/AUTO DIFF WBC: CPT

## 2024-04-05 RX ORDER — AMITRIPTYLINE HYDROCHLORIDE 25 MG/1
25 TABLET, FILM COATED ORAL NIGHTLY
Qty: 90 TABLET | Refills: 0 | Status: SHIPPED | OUTPATIENT
Start: 2024-04-05

## 2024-04-05 NOTE — TELEPHONE ENCOUNTER
Impression: Dry eye syndrome of bilateral lacrimal glands: H04.123. Plan: Patient instructed to use artificial tears as needed. Patient instructed to apply warm compresses. Patient wife called office,patient not feeling any better. Fatigued, pale with yellow \"tint \" to skin.Hands and feet very cold,does not tolerate cold weather.Does get slight short of breath with exertion.Wife would like labs ordered if you agree: Liver Panel,Hepatitis Screen,CBC.    Patient swallow video was normal, patient got results this AM.   Patient doubled   Amitriptyline 10 mg for sleep will need refill if possible.  Cardiac Monitor turned in yesterday.  Please advise.

## 2024-04-05 NOTE — TELEPHONE ENCOUNTER
Rx sent for amitriptyline 25 mg nightly.  Labs ordered.    We will follow with results but in the meantime if any worsening I would suggest he be seen in the ER.  Thank you.

## 2024-04-09 ENCOUNTER — TELEPHONE (OUTPATIENT)
Dept: PRIMARY CARE CLINIC | Age: 44
End: 2024-04-09

## 2024-04-09 NOTE — TELEPHONE ENCOUNTER
----- Message from PAOLA Rai CNP sent at 4/9/2024  8:33 AM EDT -----  HR a little elevated at times. We can discuss at upcoming visit.

## 2024-04-22 ENCOUNTER — OFFICE VISIT (OUTPATIENT)
Dept: PRIMARY CARE CLINIC | Age: 44
End: 2024-04-22
Payer: COMMERCIAL

## 2024-04-22 VITALS
TEMPERATURE: 98.3 F | OXYGEN SATURATION: 98 % | WEIGHT: 206.8 LBS | BODY MASS INDEX: 27.41 KG/M2 | DIASTOLIC BLOOD PRESSURE: 66 MMHG | SYSTOLIC BLOOD PRESSURE: 122 MMHG | HEART RATE: 87 BPM | HEIGHT: 73 IN | RESPIRATION RATE: 18 BRPM

## 2024-04-22 DIAGNOSIS — F34.1 DYSTHYMIA: Primary | ICD-10-CM

## 2024-04-22 DIAGNOSIS — F41.9 ANXIETY: ICD-10-CM

## 2024-04-22 PROCEDURE — 99214 OFFICE O/P EST MOD 30 MIN: CPT | Performed by: NURSE PRACTITIONER

## 2024-04-22 ASSESSMENT — ENCOUNTER SYMPTOMS
VISUAL CHANGE: 0
SORE THROAT: 0
COUGH: 0
RHINORRHEA: 0
SHORTNESS OF BREATH: 0
ABDOMINAL PAIN: 0
CRAMPS: 1

## 2024-04-22 NOTE — PROGRESS NOTES
Name: Damien Lopes  : 1980         Chief Complaint:     Chief Complaint   Patient presents with    Anxiety     Patient is here for 1 month f/u. Patient states he is doing well on amitripyline. Patient denies any concerns.        History of Present Illness:      Damien Lopes is a 44 y.o.  male who presents with Anxiety (Patient is here for 1 month f/u. Patient states he is doing well on amitripyline. Patient denies any concerns. )      Damien is here today for a routine office visit.    He states he is feeling much better since last visit.  He is really not having any issues with anxiety.  States he still not sleeping great but he is sleeping once he gets to sleep.  Takes about an hour or so for him to fall asleep.  He is continuing with the amitriptyline.  He has not had any headaches.  He has had no dark stools or bloody stools.  He is eating without any pain.  He states all of his stomach issues have \"gone away\".  He states he is a little bit fatigued and short of breath with heavy exertion but he has not done anything for about a month and he thinks that is why he is fatigued.  He states he is continuing to get better.    Mental Health Problem  The primary symptoms do not include dysphoric mood, delusions, hallucinations, bizarre behavior, disorganized speech, negative symptoms or somatic symptoms. The current episode started more than 1 month ago. This is a chronic problem.   The onset of the illness is precipitated by emotional stress. The degree of incapacity that he is experiencing as a consequence of his illness is moderate. Sequelae of the illness include harmed interpersonal relations. Additional symptoms of the illness include fatigue (improved). Additional symptoms of the illness do not include anhedonia, insomnia, hypersomnia, appetite change, unexpected weight change, agitation, psychomotor retardation, feelings of worthlessness, attention impairment, euphoric mood, increased

## 2024-04-22 NOTE — PATIENT INSTRUCTIONS
SURVEY:     You may be receiving a survey from [facility] [employer] regarding your visit today.     Please complete the survey to enable us to provide the highest quality of care to you and your family.     If you cannot score us a very good on any question, please call the office to discuss how we could have made your experience a very good one.     Thank you,    Prashant Brannon, APRN-CNP  Bryanna Wilson, APRN-CNP  Manuela, LPN  Marisabel, CMA  Rigoberto, CMA  Jackelyn, CMA  Wilma, PCA  Fatou, CMA  Nu, PM

## 2024-05-01 ENCOUNTER — PATIENT MESSAGE (OUTPATIENT)
Dept: PRIMARY CARE CLINIC | Age: 44
End: 2024-05-01

## 2024-05-01 RX ORDER — AMITRIPTYLINE HYDROCHLORIDE 25 MG/1
25 TABLET, FILM COATED ORAL NIGHTLY
Qty: 90 TABLET | Refills: 1 | Status: SHIPPED | OUTPATIENT
Start: 2024-05-01

## 2024-05-01 NOTE — TELEPHONE ENCOUNTER
Patient would like refill to [facility].     Health Maintenance   Topic Date Due    Hepatitis B vaccine (1 of 3 - 3-dose series) 11/30/2024 (Originally 1980)    COVID-19 Vaccine (3 - 2023-24 season) 11/30/2024 (Originally 9/1/2023)    Depression Monitoring  03/19/2025    Lipids  04/26/2027    DTaP/Tdap/Td vaccine (8 - Td or Tdap) 11/03/2033    Colorectal Cancer Screen  03/15/2034    Polio vaccine  Completed    Flu vaccine  Completed    Hepatitis C screen  Completed    HIV screen  Completed    Hepatitis A vaccine  Aged Out    Hib vaccine  Aged Out    HPV vaccine  Aged Out    Meningococcal (ACWY) vaccine  Aged Out    Pneumococcal 0-64 years Vaccine  Aged Out             (applicable per patient's age: Cancer Screenings, Depression Screening, Fall Risk Screening, Immunizations)    AST (U/L)   Date Value   04/05/2024 20     ALT (U/L)   Date Value   04/05/2024 17     BUN (mg/dL)   Date Value   04/05/2024 14      (goal A1C is < 7)   (goal LDL is <100) need 30-50% reduction from baseline     BP Readings from Last 3 Encounters:   04/22/24 122/66   03/19/24 102/68   03/15/24 116/70    (goal /80)      All Future Testing planned in [facility]:  Lab Frequency Next Occurrence       Next Visit Date:  Future Appointments   Date Time Provider Department Center   10/23/2024  3:20 PM Prashant Brannon, APRN - CNP [facility]            Patient Active Problem List:     Anxiety     Dysthymia     Biliary dyskinesia     Post-cholecystectomy syndrome     Rectal bleeding     Acute appendicitis

## 2024-05-01 NOTE — TELEPHONE ENCOUNTER
From: Damien Lopes  To: Prashant Brannon  Sent: 5/1/2024 7:09 AM EDT  Subject: Rx refill through    Can you change my elavil Rx to [facility]? I am almost out and [facility]?

## 2024-06-17 ENCOUNTER — OFFICE VISIT (OUTPATIENT)
Dept: PRIMARY CARE CLINIC | Age: 44
End: 2024-06-17
Payer: COMMERCIAL

## 2024-06-17 VITALS
WEIGHT: 207.6 LBS | RESPIRATION RATE: 16 BRPM | BODY MASS INDEX: 27.39 KG/M2 | DIASTOLIC BLOOD PRESSURE: 78 MMHG | SYSTOLIC BLOOD PRESSURE: 112 MMHG | TEMPERATURE: 98.3 F | HEART RATE: 104 BPM | OXYGEN SATURATION: 99 %

## 2024-06-17 DIAGNOSIS — J02.9 PHARYNGITIS, UNSPECIFIED ETIOLOGY: ICD-10-CM

## 2024-06-17 DIAGNOSIS — B96.89 ACUTE BACTERIAL TONSILLITIS: Primary | ICD-10-CM

## 2024-06-17 DIAGNOSIS — J03.80 ACUTE BACTERIAL TONSILLITIS: Primary | ICD-10-CM

## 2024-06-17 LAB — S PYO AG THROAT QL: NORMAL

## 2024-06-17 PROCEDURE — 87880 STREP A ASSAY W/OPTIC: CPT | Performed by: NURSE PRACTITIONER

## 2024-06-17 PROCEDURE — 99214 OFFICE O/P EST MOD 30 MIN: CPT | Performed by: NURSE PRACTITIONER

## 2024-06-17 RX ORDER — AMOXICILLIN AND CLAVULANATE POTASSIUM 875; 125 MG/1; MG/1
1 TABLET, FILM COATED ORAL 2 TIMES DAILY
Qty: 20 TABLET | Refills: 0 | Status: SHIPPED | OUTPATIENT
Start: 2024-06-17 | End: 2024-06-27

## 2024-06-17 ASSESSMENT — PATIENT HEALTH QUESTIONNAIRE - PHQ9
10. IF YOU CHECKED OFF ANY PROBLEMS, HOW DIFFICULT HAVE THESE PROBLEMS MADE IT FOR YOU TO DO YOUR WORK, TAKE CARE OF THINGS AT HOME, OR GET ALONG WITH OTHER PEOPLE: NOT DIFFICULT AT ALL
8. MOVING OR SPEAKING SO SLOWLY THAT OTHER PEOPLE COULD HAVE NOTICED. OR THE OPPOSITE, BEING SO FIGETY OR RESTLESS THAT YOU HAVE BEEN MOVING AROUND A LOT MORE THAN USUAL: NOT AT ALL
3. TROUBLE FALLING OR STAYING ASLEEP: NOT AT ALL
SUM OF ALL RESPONSES TO PHQ QUESTIONS 1-9: 0
SUM OF ALL RESPONSES TO PHQ9 QUESTIONS 1 & 2: 0
SUM OF ALL RESPONSES TO PHQ QUESTIONS 1-9: 0
2. FEELING DOWN, DEPRESSED OR HOPELESS: NOT AT ALL
SUM OF ALL RESPONSES TO PHQ QUESTIONS 1-9: 0
9. THOUGHTS THAT YOU WOULD BE BETTER OFF DEAD, OR OF HURTING YOURSELF: NOT AT ALL
6. FEELING BAD ABOUT YOURSELF - OR THAT YOU ARE A FAILURE OR HAVE LET YOURSELF OR YOUR FAMILY DOWN: NOT AT ALL
1. LITTLE INTEREST OR PLEASURE IN DOING THINGS: NOT AT ALL
7. TROUBLE CONCENTRATING ON THINGS, SUCH AS READING THE NEWSPAPER OR WATCHING TELEVISION: NOT AT ALL
5. POOR APPETITE OR OVEREATING: NOT AT ALL
SUM OF ALL RESPONSES TO PHQ QUESTIONS 1-9: 0
4. FEELING TIRED OR HAVING LITTLE ENERGY: NOT AT ALL

## 2024-06-17 ASSESSMENT — ENCOUNTER SYMPTOMS
GASTROINTESTINAL NEGATIVE: 1
SORE THROAT: 1
EYES NEGATIVE: 1
COUGH: 1

## 2024-06-17 NOTE — PROGRESS NOTES
[facility]  ALFREDO QUARLES CNP  [facility]  [address] [address]  Dept: [phone]  Dept Fax: [phone]      Name: Damien Lopes  : 1980         Chief Complaint:     Chief Complaint   Patient presents with    Pharyngitis     Patient c/o sore throat and fatigue. Patient went to Urgent Care last weekend and he was given Z pack and Prednisone for Bronchitis. Patient finished those medications.        History of Present Illness:      Damien Lopes is a 44 y.o.  male who presents with Pharyngitis (Patient c/o sore throat and fatigue. Patient went to Urgent Care last weekend and he was given Z pack and Prednisone for Bronchitis. Patient finished those medications. )      TONYA Quezada is here today for complaints of a sore throat for a week.  He was seen a week ago in the UR and was prescribed  Prednisone and zpack for Bronchitis.  He has continued to have sore throat and fatigue.  He does have drainage that is post nasal.  He does take allegra for his allergies.  He was taking Mucinex at home but has not taken it now for 5 days.  He states his cough is more to clear his throat.  Denies a fever.      Past Medical History:     Past Medical History:   Diagnosis Date    Anxiety     Headache       Reviewed all health maintenance requirements and ordered appropriate tests  There are no preventive care reminders to display for this patient.    Past Surgical History:     Past Surgical History:   Procedure Laterality Date    ANKLE FRACTURE SURGERY  2008    right trimalleolar fracture ORIF - Dr. Casarez    CHOLECYSTECTOMY      CHOLECYSTECTOMY, LAPAROSCOPIC N/A 2020    CHOLECYSTECTOMY LAPAROSCOPIC ROBOTIC performed by Samuel Haney MD at [facility] OR    COLONOSCOPY N/A 3/15/2024    COLORECTAL CANCER SCREENING, NOT HIGH RISK performed by Marimar Rizvi DO at [facility] OR    FRACTURE SURGERY      SHOULDER ARTHROSCOPY Right 2017    Dr. Casarez -

## 2024-06-17 NOTE — PATIENT INSTRUCTIONS
SURVEY:     You may be receiving a survey from [facility] [employer] regarding your visit today.     Please complete the survey to enable us to provide the highest quality of care to you and your family.     If you cannot score us a very good on any question, please call the office to discuss how we could have made your experience a very good one.     Thank you,    Prashant Brannon, APRN-CNP  Bryanna Wilson, APRN-CNP  Manuela, LPN  Marisabel, CMA  Rigoberto, CMA  Jackelyn, CMA  Wilma, PCA  Fatuo, CMA  Nu, PM

## 2024-07-08 DIAGNOSIS — F41.9 ANXIETY: ICD-10-CM

## 2024-07-08 DIAGNOSIS — F34.1 DYSTHYMIA: ICD-10-CM

## 2024-07-08 RX ORDER — DULOXETIN HYDROCHLORIDE 60 MG/1
60 CAPSULE, DELAYED RELEASE ORAL DAILY
Qty: 90 CAPSULE | Refills: 3 | Status: SHIPPED | OUTPATIENT
Start: 2024-07-08

## 2024-07-08 NOTE — TELEPHONE ENCOUNTER
I restarted taking the duloxetine hcl 60 MG for anxiety. Can you send me [facility]?     thanks,     Bernden Lopes    Patient sent [facility] message will you send refill?  Thank you

## 2024-08-14 RX ORDER — AMITRIPTYLINE HYDROCHLORIDE 25 MG/1
25 TABLET, FILM COATED ORAL NIGHTLY
Qty: 90 TABLET | Refills: 1 | Status: SHIPPED | OUTPATIENT
Start: 2024-08-14

## 2024-09-17 ENCOUNTER — OFFICE VISIT (OUTPATIENT)
Dept: PRIMARY CARE CLINIC | Age: 44
End: 2024-09-17

## 2024-09-17 VITALS
WEIGHT: 210.5 LBS | DIASTOLIC BLOOD PRESSURE: 72 MMHG | SYSTOLIC BLOOD PRESSURE: 120 MMHG | OXYGEN SATURATION: 98 % | TEMPERATURE: 98.1 F | HEART RATE: 97 BPM | BODY MASS INDEX: 27.77 KG/M2

## 2024-09-17 DIAGNOSIS — Z23 NEED FOR INFLUENZA VACCINATION: ICD-10-CM

## 2024-09-17 DIAGNOSIS — F41.9 ANXIETY: ICD-10-CM

## 2024-09-17 DIAGNOSIS — F34.1 DYSTHYMIA: ICD-10-CM

## 2024-09-17 DIAGNOSIS — B35.6 TINEA CRURIS: Primary | ICD-10-CM

## 2024-09-17 PROBLEM — K35.80 ACUTE APPENDICITIS: Status: RESOLVED | Noted: 2024-03-13 | Resolved: 2024-09-17

## 2024-09-17 RX ORDER — FLUCONAZOLE 150 MG/1
150 TABLET ORAL ONCE
Qty: 1 TABLET | Refills: 0 | Status: SHIPPED | OUTPATIENT
Start: 2024-09-17 | End: 2024-09-17

## 2024-09-17 RX ORDER — CLOTRIMAZOLE AND BETAMETHASONE DIPROPIONATE 10; .64 MG/G; MG/G
CREAM TOPICAL
Qty: 45 G | Refills: 0 | Status: SHIPPED | OUTPATIENT
Start: 2024-09-17

## 2024-09-17 ASSESSMENT — ENCOUNTER SYMPTOMS
SHORTNESS OF BREATH: 0
ABDOMINAL PAIN: 0
VISUAL CHANGE: 0
SORE THROAT: 0
RHINORRHEA: 0
COUGH: 0
WHEEZING: 0

## 2024-10-07 DIAGNOSIS — F34.1 DYSTHYMIA: ICD-10-CM

## 2024-10-07 DIAGNOSIS — F41.9 ANXIETY: ICD-10-CM

## 2024-10-07 RX ORDER — DULOXETIN HYDROCHLORIDE 60 MG/1
60 CAPSULE, DELAYED RELEASE ORAL DAILY
Qty: 90 CAPSULE | Refills: 3 | Status: SHIPPED | OUTPATIENT
Start: 2024-10-07 | End: 2024-10-11 | Stop reason: SDUPTHER

## 2024-10-07 NOTE — TELEPHONE ENCOUNTER
Health Maintenance   Topic Date Due    COVID-19 Vaccine (3 - 2023-24 season) 09/01/2024    Hepatitis B vaccine (1 of 3 - 19+ 3-dose series) 11/30/2024 (Originally 1/11/1999)    Depression Monitoring  06/17/2025    Lipids  04/26/2027    DTaP/Tdap/Td vaccine (8 - Td or Tdap) 11/03/2033    Colorectal Cancer Screen  03/15/2034    Polio vaccine  Completed    Flu vaccine  Completed    Hepatitis C screen  Completed    HIV screen  Completed    Hepatitis A vaccine  Aged Out    Hib vaccine  Aged Out    HPV vaccine  Aged Out    Meningococcal (ACWY) vaccine  Aged Out    Pneumococcal 0-64 years Vaccine  Aged Out             (applicable per patient's age: Cancer Screenings, Depression Screening, Fall Risk Screening, Immunizations)    AST (U/L)   Date Value   04/05/2024 20     ALT (U/L)   Date Value   04/05/2024 17     BUN (mg/dL)   Date Value   04/05/2024 14      (goal A1C is < 7)   (goal LDL is <100) need 30-50% reduction from baseline     BP Readings from Last 3 Encounters:   09/17/24 120/72   08/12/24 111/79   06/17/24 112/78    (goal /80)      All Future Testing planned in [facility]:  Lab Frequency Next Occurrence       Next Visit Date:  Future Appointments   Date Time Provider Department Center   3/17/2025  3:00 PM Prashant Brannon, APRN - CNP [facility]            Patient Active Problem List:     Anxiety     Dysthymia     Biliary dyskinesia     Post-cholecystectomy syndrome     Rectal bleeding

## 2024-10-11 DIAGNOSIS — F41.9 ANXIETY: ICD-10-CM

## 2024-10-11 DIAGNOSIS — F34.1 DYSTHYMIA: ICD-10-CM

## 2024-10-11 RX ORDER — DULOXETIN HYDROCHLORIDE 60 MG/1
60 CAPSULE, DELAYED RELEASE ORAL DAILY
Qty: 90 CAPSULE | Refills: 3 | Status: SHIPPED | OUTPATIENT
Start: 2024-10-11

## 2024-10-14 ENCOUNTER — PATIENT MESSAGE (OUTPATIENT)
Dept: PRIMARY CARE CLINIC | Age: 44
End: 2024-10-14

## 2024-10-14 DIAGNOSIS — F34.1 DYSTHYMIA: ICD-10-CM

## 2024-10-14 DIAGNOSIS — F41.9 ANXIETY: ICD-10-CM

## 2024-10-14 RX ORDER — DULOXETIN HYDROCHLORIDE 60 MG/1
60 CAPSULE, DELAYED RELEASE ORAL DAILY
Qty: 30 CAPSULE | Refills: 0 | Status: SHIPPED | OUTPATIENT
Start: 2024-10-14

## 2024-10-14 NOTE — TELEPHONE ENCOUNTER
Patient is having confusion with mail in pharmacy. They keep saying they do not have the script. I am going to call them to see what exactly they need. Patient is going to be out of the medication can you send short script to [facility]?  Thank you

## 2025-03-18 SDOH — ECONOMIC STABILITY: INCOME INSECURITY: IN THE LAST 12 MONTHS, WAS THERE A TIME WHEN YOU WERE NOT ABLE TO PAY THE MORTGAGE OR RENT ON TIME?: NO

## 2025-03-18 SDOH — ECONOMIC STABILITY: FOOD INSECURITY: WITHIN THE PAST 12 MONTHS, THE FOOD YOU BOUGHT JUST DIDN'T LAST AND YOU DIDN'T HAVE MONEY TO GET MORE.: NEVER TRUE

## 2025-03-18 SDOH — ECONOMIC STABILITY: FOOD INSECURITY: WITHIN THE PAST 12 MONTHS, YOU WORRIED THAT YOUR FOOD WOULD RUN OUT BEFORE YOU GOT MONEY TO BUY MORE.: NEVER TRUE

## 2025-03-18 ASSESSMENT — PATIENT HEALTH QUESTIONNAIRE - PHQ9
1. LITTLE INTEREST OR PLEASURE IN DOING THINGS: NOT AT ALL
10. IF YOU CHECKED OFF ANY PROBLEMS, HOW DIFFICULT HAVE THESE PROBLEMS MADE IT FOR YOU TO DO YOUR WORK, TAKE CARE OF THINGS AT HOME, OR GET ALONG WITH OTHER PEOPLE: NOT DIFFICULT AT ALL
7. TROUBLE CONCENTRATING ON THINGS, SUCH AS READING THE NEWSPAPER OR WATCHING TELEVISION: NOT AT ALL
10. IF YOU CHECKED OFF ANY PROBLEMS, HOW DIFFICULT HAVE THESE PROBLEMS MADE IT FOR YOU TO DO YOUR WORK, TAKE CARE OF THINGS AT HOME, OR GET ALONG WITH OTHER PEOPLE: NOT DIFFICULT AT ALL
8. MOVING OR SPEAKING SO SLOWLY THAT OTHER PEOPLE COULD HAVE NOTICED. OR THE OPPOSITE - BEING SO FIDGETY OR RESTLESS THAT YOU HAVE BEEN MOVING AROUND A LOT MORE THAN USUAL: NOT AT ALL
6. FEELING BAD ABOUT YOURSELF - OR THAT YOU ARE A FAILURE OR HAVE LET YOURSELF OR YOUR FAMILY DOWN: NOT AT ALL
9. THOUGHTS THAT YOU WOULD BE BETTER OFF DEAD, OR OF HURTING YOURSELF: NOT AT ALL
4. FEELING TIRED OR HAVING LITTLE ENERGY: NOT AT ALL
1. LITTLE INTEREST OR PLEASURE IN DOING THINGS: NOT AT ALL
SUM OF ALL RESPONSES TO PHQ QUESTIONS 1-9: 0
6. FEELING BAD ABOUT YOURSELF - OR THAT YOU ARE A FAILURE OR HAVE LET YOURSELF OR YOUR FAMILY DOWN: NOT AT ALL
7. TROUBLE CONCENTRATING ON THINGS, SUCH AS READING THE NEWSPAPER OR WATCHING TELEVISION: NOT AT ALL
4. FEELING TIRED OR HAVING LITTLE ENERGY: NOT AT ALL
2. FEELING DOWN, DEPRESSED OR HOPELESS: NOT AT ALL
9. THOUGHTS THAT YOU WOULD BE BETTER OFF DEAD, OR OF HURTING YOURSELF: NOT AT ALL
SUM OF ALL RESPONSES TO PHQ QUESTIONS 1-9: 0
SUM OF ALL RESPONSES TO PHQ QUESTIONS 1-9: 0
3. TROUBLE FALLING OR STAYING ASLEEP: NOT AT ALL
5. POOR APPETITE OR OVEREATING: NOT AT ALL
8. MOVING OR SPEAKING SO SLOWLY THAT OTHER PEOPLE COULD HAVE NOTICED. OR THE OPPOSITE, BEING SO FIGETY OR RESTLESS THAT YOU HAVE BEEN MOVING AROUND A LOT MORE THAN USUAL: NOT AT ALL
3. TROUBLE FALLING OR STAYING ASLEEP: NOT AT ALL
SUM OF ALL RESPONSES TO PHQ QUESTIONS 1-9: 0
5. POOR APPETITE OR OVEREATING: NOT AT ALL
2. FEELING DOWN, DEPRESSED OR HOPELESS: NOT AT ALL
SUM OF ALL RESPONSES TO PHQ QUESTIONS 1-9: 0

## 2025-03-21 ENCOUNTER — OFFICE VISIT (OUTPATIENT)
Dept: PRIMARY CARE CLINIC | Age: 45
End: 2025-03-21
Payer: COMMERCIAL

## 2025-03-21 VITALS
HEIGHT: 73 IN | SYSTOLIC BLOOD PRESSURE: 116 MMHG | WEIGHT: 206.5 LBS | DIASTOLIC BLOOD PRESSURE: 80 MMHG | HEART RATE: 105 BPM | OXYGEN SATURATION: 99 % | TEMPERATURE: 97.5 F | BODY MASS INDEX: 27.37 KG/M2 | RESPIRATION RATE: 16 BRPM

## 2025-03-21 DIAGNOSIS — F34.1 DYSTHYMIA: Primary | ICD-10-CM

## 2025-03-21 DIAGNOSIS — G44.209 TENSION HEADACHE: ICD-10-CM

## 2025-03-21 DIAGNOSIS — K91.5 POST-CHOLECYSTECTOMY SYNDROME: ICD-10-CM

## 2025-03-21 DIAGNOSIS — F41.9 ANXIETY: ICD-10-CM

## 2025-03-21 PROCEDURE — 99214 OFFICE O/P EST MOD 30 MIN: CPT | Performed by: NURSE PRACTITIONER

## 2025-03-21 RX ORDER — DULOXETIN HYDROCHLORIDE 60 MG/1
60 CAPSULE, DELAYED RELEASE ORAL DAILY
Qty: 90 CAPSULE | Refills: 3 | Status: SHIPPED | OUTPATIENT
Start: 2025-03-21

## 2025-03-21 ASSESSMENT — ENCOUNTER SYMPTOMS
CONSTIPATION: 0
BLOATING: 0
VISUAL CHANGE: 0
SHORTNESS OF BREATH: 0
DIARRHEA: 1
RHINORRHEA: 0
FLATUS: 0
COUGH: 0
WHEEZING: 0
VOMITING: 0
ABDOMINAL PAIN: 0
SORE THROAT: 0
NAUSEA: 0

## 2025-03-21 NOTE — PROGRESS NOTES
He does not have a plan to attempt suicide. He does not contemplate harming themself. He has not already injured self. He does not contemplate injuring another person. He has not already  injured another person. Risk factors that are present for mental illness include a family history of mental illness.   Diarrhea   This is a chronic problem. The current episode started more than 1 year ago. The problem occurs less than 2 times per day. The problem has been gradually improving. The patient states that diarrhea does not awaken him from sleep. Associated symptoms include headaches. Pertinent negatives include no abdominal pain, arthralgias, bloating, chills, coughing, fever, increased  flatus, myalgias, sweats, URI, vomiting or weight loss. Exacerbated by: CERTAIN FOOD, FATTY FOODS. Risk factors: CHOLECYSTECTOMY. Treatments tried: WELCHOL. The treatment provided significant relief. His past medical history is significant for irritable bowel syndrome. There is no history of bowel resection, inflammatory bowel disease, malabsorption, a recent abdominal surgery or short gut syndrome.         Past Medical History:     Past Medical History:   Diagnosis Date    Anxiety     Headache       Reviewed all health maintenance requirements and ordered appropriate tests  Health Maintenance Due   Topic Date Due    COVID-19 Vaccine (3 - 2024-25 season) 09/01/2024       Past Surgical History:     Past Surgical History:   Procedure Laterality Date    ANKLE FRACTURE SURGERY  2008    right trimalleolar fracture ORIF - Dr. Casarez    CHOLECYSTECTOMY      CHOLECYSTECTOMY, LAPAROSCOPIC N/A 07/08/2020    CHOLECYSTECTOMY LAPAROSCOPIC ROBOTIC performed by Samuel Haney MD at [facility] OR    COLONOSCOPY N/A 3/15/2024    COLORECTAL CANCER SCREENING, NOT HIGH RISK performed by Marimar Rizvi DO at [facility] OR    FRACTURE SURGERY  2008    SHOULDER ARTHROSCOPY Right 05/26/2017    Dr. Casarez - right shoulder SLAP repair    TONSILLECTOMY  1984

## 2025-03-21 NOTE — PATIENT INSTRUCTIONS
SURVEY:     You may be receiving a survey from [facility] [email or web address] regarding your visit today.     Please complete the survey to enable us to provide the highest quality of care to you and your family.     If you cannot score us a very good on any question, please call the office to discuss how we could have made your experience a very good one.     Thank you,    Prashant Brannon, APRN-CNP  Bryanna Wilson, APRN-CNP  Manuela, LPN  Marisabel, CMA  Rigoberto, CMA  Jackelyn, CMA  Wilma, PCA  Fatou, CMA  Nu, PM

## (undated) DEVICE — SYRINGE MED 10ML LUERLOCK TIP W/O SFTY DISP

## (undated) DEVICE — 40595 XL TRENDELENBURG POSITIONING KIT: Brand: 40595 XL TRENDELENBURG POSITIONING KIT

## (undated) DEVICE — WARMER SCP 2 ANTIFOG LAP DISP

## (undated) DEVICE — TUBING PMP L16FT MAIN DISP FOR AR-6400 AR-6475

## (undated) DEVICE — 35 ML SYRINGE LUER-LOCK TIP: Brand: MONOJECT

## (undated) DEVICE — ELECTRODE PT RET AD L9FT HI MOIST COND ADH HYDRGEL CORDED

## (undated) DEVICE — INTENDED FOR TISSUE SEPARATION, AND OTHER PROCEDURES THAT REQUIRE A SHARP SURGICAL BLADE TO PUNCTURE OR CUT.: Brand: BARD-PARKER ® CARBON RIB-BACK BLADES

## (undated) DEVICE — WRAP POS SUPP DISP FOR L ARM

## (undated) DEVICE — Device

## (undated) DEVICE — NDLCTR: FOAM/MAG 40CT 64/CS: Brand: MEDICAL ACTION INDUSTRIES

## (undated) DEVICE — DBD-PACK,BASIC,VI,AURORA: Brand: MEDLINE

## (undated) DEVICE — PACK,SHOULDER III,DRAPE,10/CS: Brand: MEDLINE

## (undated) DEVICE — SUTURE SZ 0 27IN 5/8 CIR UR-6  TAPER PT VIOLET ABSRB VICRYL J603H

## (undated) DEVICE — RESTRAINT POS UNIV HD NK ALIGN DISP FOR SHLDR PROC

## (undated) DEVICE — NEEDLE SPNL L3.5IN PNK HUB S STL REG WALL FIT STYL W/ QNCKE

## (undated) DEVICE — SOLUTION IV 1000ML 0.9% SOD CHL FOR IRRIG PLAS CONT

## (undated) DEVICE — CANNULA ARTHSCP L7CM DIA7MM TRNSLUC THRD FLX W/ NO SQUIRT

## (undated) DEVICE — GLOVE ORANGE PI 7 1/2   MSG9075

## (undated) DEVICE — INTENDED FOR TISSUE SEPARATION, AND OTHER PROCEDURES THAT REQUIRE A SHARP SURGICAL BLADE TO PUNCTURE OR CUT.: Brand: BARD-PARKER SAFETY BLADES SIZE 15, STERILE

## (undated) DEVICE — BANDAGE TAN 4INX5YD CO FLX NL

## (undated) DEVICE — GLOVE SURG SZ 75 L12IN FNGR THK87MIL DK GRN LTX FREE ISOLEX

## (undated) DEVICE — 3M™ IOBAN™ 2 ANTIMICROBIAL INCISE DRAPE 6648EZ: Brand: IOBAN™ 2

## (undated) DEVICE — MEDI-VAC NON-CONDUCTIVE SUCTION TUBING 7MM X 3.7M (12 FT.) L: Brand: CARDINAL HEALTH

## (undated) DEVICE — SOLUTION IV IRRIG POUR BRL 0.9% SODIUM CHL 2F7124

## (undated) DEVICE — ARM DRAPE

## (undated) DEVICE — CONVERTED USE 338908 SPONGES LAP 18X18 ST

## (undated) DEVICE — 3M™ STERI-DRAPE™ U-DRAPE 1015: Brand: STERI-DRAPE™

## (undated) DEVICE — SYRINGE MED 20ML STD CLR PLAS LUERLOCK TIP N CTRL DISP

## (undated) DEVICE — SOLUTION IRRIG 1000ML 0.9% SOD CHL USP POUR PLAS BTL

## (undated) DEVICE — CONVERTED USE 393286 PROTECTOR EYE FOAM ST MEDICHOICE

## (undated) DEVICE — COVER LT HNDL BLU PLAS

## (undated) DEVICE — PENCIL ES L3M BTTN SWCH HOLSTER W/ BLDE ELECTRD EDGE

## (undated) DEVICE — GLOVE SURG SZ 75 L12IN FNGR THK87MIL WHT LTX FREE

## (undated) DEVICE — DRAPE,UTILTY,TAPE,15X26, 4EA/PK: Brand: MEDLINE

## (undated) DEVICE — CHLORAPREP 26ML ORANGE

## (undated) DEVICE — PLUMEPORT LAPAROSCOPIC SMOKE FILTRATION DEVICE: Brand: PLUMEPORT ACTIV

## (undated) DEVICE — SPONGE GZ W4XL4IN COT 12 PLY TYP VII WVN C FLD DSGN

## (undated) DEVICE — GAUZE,SPONGE,4"X4",16PLY,XRAY,STRL,LF: Brand: MEDLINE

## (undated) DEVICE — 3M™ TEGADERM™ +PAD FILM DRESSING WITH NON-ADHERENT PAD, 3587, 3-1/2 IN X 4-1/8 IN (9 CM X 10.5 CM), 25/CAR, 4 CAR/CS: Brand: 3M™ TEGADERM™

## (undated) DEVICE — CANNULA ORAL NSL AD CO2 N INTUB O2 DEL DISP TRU LNK

## (undated) DEVICE — DRIP REDUCTION MANIFOLD

## (undated) DEVICE — [TOMCAT CUTTER, ARTHROSCOPIC SHAVER BLADE,  DO NOT RESTERILIZE,  DO NOT USE IF PACKAGE IS DAMAGED,  KEEP DRY,  KEEP AWAY FROM SUNLIGHT]: Brand: FORMULA

## (undated) DEVICE — TUBING SUCT NON-STRL 9/32X100 W/CNNT

## (undated) DEVICE — INSUFFLATION NEEDLE TO ESTABLISH PNEUMOPERITONEUM.: Brand: INSUFFLATION NEEDLE

## (undated) DEVICE — SHEET,DRAPE,53X77,STERILE: Brand: MEDLINE

## (undated) DEVICE — CANNULA SEAL

## (undated) DEVICE — PASSER SUT 90DEG STR SUTLASSO SD

## (undated) DEVICE — 3M™ TEGADERM™ TRANSPARENT FILM DRESSING FRAME STYLE, 1629, 8 IN X 12 IN (20 CM X 30 CM), 10/CT 8CT/CASE: Brand: 3M™ TEGADERM™

## (undated) DEVICE — STAPLER SKIN H3.9MM WIRE DIA0.58MM CRWN 6.9MM 35 STPL FIX

## (undated) DEVICE — Z DUP USE 2641840 CLIP INT L POLYMER LOK LIG HEM O LOK

## (undated) DEVICE — SUTURE LABRALTAPE L36IN DIA1.5MM NONABSORBABLE WHT POLY AR7276

## (undated) DEVICE — GOWN,REINF,POLY,AURORA,XLNG/XL,STRL: Brand: MEDLINE

## (undated) DEVICE — BAG SPEC REM 224ML W4XL6IN DIA10MM 1 HND GYN DISP ENDOPCH

## (undated) DEVICE — SUTURE NONABSORBABLE MONOFILAMENT 3-0 PS-1 18 IN BLK ETHILON 1663H

## (undated) DEVICE — TROCARS: Brand: KII® BALLOON BLUNT TIP SYSTEM

## (undated) DEVICE — DRESSING PETRO W3XL8IN OIL EMUL N ADH GZ KNIT IMPREG CELOS

## (undated) DEVICE — PAD,ABDOMINAL,8"X10",ST,LF: Brand: MEDLINE

## (undated) DEVICE — PEN: MARKING STD 100/CS: Brand: MEDICAL ACTION INDUSTRIES

## (undated) DEVICE — SOLUTION IV IRRIG 0.9% NACL 3000ML BAG 2B7477

## (undated) DEVICE — BLADELESS OBTURATOR: Brand: WECK VISTA